# Patient Record
Sex: MALE | Race: WHITE | NOT HISPANIC OR LATINO
[De-identification: names, ages, dates, MRNs, and addresses within clinical notes are randomized per-mention and may not be internally consistent; named-entity substitution may affect disease eponyms.]

---

## 2017-05-16 ENCOUNTER — RX RENEWAL (OUTPATIENT)
Age: 67
End: 2017-05-16

## 2018-05-15 ENCOUNTER — APPOINTMENT (OUTPATIENT)
Dept: UROLOGY | Facility: CLINIC | Age: 68
End: 2018-05-15

## 2018-05-15 VITALS
RESPIRATION RATE: 17 BRPM | HEART RATE: 50 BPM | DIASTOLIC BLOOD PRESSURE: 85 MMHG | TEMPERATURE: 98.6 F | SYSTOLIC BLOOD PRESSURE: 153 MMHG

## 2018-05-22 LAB — CORE LAB FLUID CYTOLOGY: NORMAL

## 2018-09-08 ENCOUNTER — INPATIENT (INPATIENT)
Facility: HOSPITAL | Age: 68
LOS: 4 days | Discharge: HOME | DRG: 392 | End: 2018-09-13
Attending: INTERNAL MEDICINE | Admitting: INTERNAL MEDICINE
Payer: MEDICARE

## 2018-09-08 VITALS — HEIGHT: 71 IN | WEIGHT: 179.9 LBS

## 2018-09-08 DIAGNOSIS — I48.91 UNSPECIFIED ATRIAL FIBRILLATION: ICD-10-CM

## 2018-09-08 DIAGNOSIS — I25.2 OLD MYOCARDIAL INFARCTION: ICD-10-CM

## 2018-09-08 DIAGNOSIS — K57.20 DIVERTICULITIS OF LARGE INTESTINE WITH PERFORATION AND ABSCESS WITHOUT BLEEDING: ICD-10-CM

## 2018-09-08 DIAGNOSIS — Z90.89 ACQUIRED ABSENCE OF OTHER ORGANS: Chronic | ICD-10-CM

## 2018-09-08 DIAGNOSIS — S43.004S UNSPECIFIED DISLOCATION OF RIGHT SHOULDER JOINT, SEQUELA: Chronic | ICD-10-CM

## 2018-09-08 DIAGNOSIS — Z90.49 ACQUIRED ABSENCE OF OTHER SPECIFIED PARTS OF DIGESTIVE TRACT: Chronic | ICD-10-CM

## 2018-09-08 DIAGNOSIS — Z29.9 ENCOUNTER FOR PROPHYLACTIC MEASURES, UNSPECIFIED: ICD-10-CM

## 2018-09-08 LAB
ALBUMIN SERPL ELPH-MCNC: 4.3 G/DL — SIGNIFICANT CHANGE UP (ref 3.3–5)
ALP SERPL-CCNC: 96 U/L — SIGNIFICANT CHANGE UP (ref 40–120)
ALT FLD-CCNC: 34 U/L — SIGNIFICANT CHANGE UP (ref 12–78)
ANION GAP SERPL CALC-SCNC: 9 MMOL/L — SIGNIFICANT CHANGE UP (ref 5–17)
APPEARANCE UR: CLEAR — SIGNIFICANT CHANGE UP
APTT BLD: 34.7 SEC — SIGNIFICANT CHANGE UP (ref 27.5–37.4)
AST SERPL-CCNC: 28 U/L — SIGNIFICANT CHANGE UP (ref 15–37)
BASOPHILS # BLD AUTO: 0 K/UL — SIGNIFICANT CHANGE UP (ref 0–0.2)
BASOPHILS # BLD AUTO: 0.02 K/UL — SIGNIFICANT CHANGE UP (ref 0–0.2)
BASOPHILS NFR BLD AUTO: 0 % — SIGNIFICANT CHANGE UP (ref 0–2)
BASOPHILS NFR BLD AUTO: 0.2 % — SIGNIFICANT CHANGE UP (ref 0–2)
BILIRUB SERPL-MCNC: 1.4 MG/DL — HIGH (ref 0.2–1.2)
BILIRUB UR-MCNC: NEGATIVE — SIGNIFICANT CHANGE UP
BUN SERPL-MCNC: 19 MG/DL — SIGNIFICANT CHANGE UP (ref 7–23)
CALCIUM SERPL-MCNC: 9.3 MG/DL — SIGNIFICANT CHANGE UP (ref 8.5–10.1)
CHLORIDE SERPL-SCNC: 105 MMOL/L — SIGNIFICANT CHANGE UP (ref 96–108)
CO2 SERPL-SCNC: 29 MMOL/L — SIGNIFICANT CHANGE UP (ref 22–31)
COLOR SPEC: YELLOW — SIGNIFICANT CHANGE UP
CREAT SERPL-MCNC: 1.1 MG/DL — SIGNIFICANT CHANGE UP (ref 0.5–1.3)
DIFF PNL FLD: NEGATIVE — SIGNIFICANT CHANGE UP
EOSINOPHIL # BLD AUTO: 0 K/UL — SIGNIFICANT CHANGE UP (ref 0–0.5)
EOSINOPHIL # BLD AUTO: 0.04 K/UL — SIGNIFICANT CHANGE UP (ref 0–0.5)
EOSINOPHIL NFR BLD AUTO: 0 % — SIGNIFICANT CHANGE UP (ref 0–6)
EOSINOPHIL NFR BLD AUTO: 0.4 % — SIGNIFICANT CHANGE UP (ref 0–6)
GLUCOSE SERPL-MCNC: 129 MG/DL — HIGH (ref 70–99)
GLUCOSE UR QL: NEGATIVE — SIGNIFICANT CHANGE UP
HCT VFR BLD CALC: 37.4 % — LOW (ref 39–50)
HCT VFR BLD CALC: 43.2 % — SIGNIFICANT CHANGE UP (ref 39–50)
HGB BLD-MCNC: 13.1 G/DL — SIGNIFICANT CHANGE UP (ref 13–17)
HGB BLD-MCNC: 14.7 G/DL — SIGNIFICANT CHANGE UP (ref 13–17)
IMM GRANULOCYTES NFR BLD AUTO: 0.3 % — SIGNIFICANT CHANGE UP (ref 0–1.5)
INR BLD: 1.38 RATIO — HIGH (ref 0.88–1.16)
INR BLD: 1.94 RATIO — HIGH (ref 0.88–1.16)
KETONES UR-MCNC: NEGATIVE — SIGNIFICANT CHANGE UP
LEUKOCYTE ESTERASE UR-ACNC: NEGATIVE — SIGNIFICANT CHANGE UP
LIDOCAIN IGE QN: 211 U/L — SIGNIFICANT CHANGE UP (ref 73–393)
LYMPHOCYTES # BLD AUTO: 0.26 K/UL — LOW (ref 1–3.3)
LYMPHOCYTES # BLD AUTO: 0.56 K/UL — LOW (ref 1–3.3)
LYMPHOCYTES # BLD AUTO: 2 % — LOW (ref 13–44)
LYMPHOCYTES # BLD AUTO: 5.2 % — LOW (ref 13–44)
MANUAL SMEAR VERIFICATION: YES — SIGNIFICANT CHANGE UP
MCHC RBC-ENTMCNC: 30.3 PG — SIGNIFICANT CHANGE UP (ref 27–34)
MCHC RBC-ENTMCNC: 30.6 PG — SIGNIFICANT CHANGE UP (ref 27–34)
MCHC RBC-ENTMCNC: 34 GM/DL — SIGNIFICANT CHANGE UP (ref 32–36)
MCHC RBC-ENTMCNC: 35 GM/DL — SIGNIFICANT CHANGE UP (ref 32–36)
MCV RBC AUTO: 87.4 FL — SIGNIFICANT CHANGE UP (ref 80–100)
MCV RBC AUTO: 89.1 FL — SIGNIFICANT CHANGE UP (ref 80–100)
MONOCYTES # BLD AUTO: 0.26 K/UL — SIGNIFICANT CHANGE UP (ref 0–0.9)
MONOCYTES # BLD AUTO: 0.48 K/UL — SIGNIFICANT CHANGE UP (ref 0–0.9)
MONOCYTES NFR BLD AUTO: 2 % — SIGNIFICANT CHANGE UP (ref 2–14)
MONOCYTES NFR BLD AUTO: 4.5 % — SIGNIFICANT CHANGE UP (ref 2–14)
NEUTROPHILS # BLD AUTO: 12.51 K/UL — HIGH (ref 1.8–7.4)
NEUTROPHILS # BLD AUTO: 9.63 K/UL — HIGH (ref 1.8–7.4)
NEUTROPHILS NFR BLD AUTO: 84 % — HIGH (ref 43–77)
NEUTROPHILS NFR BLD AUTO: 89.4 % — HIGH (ref 43–77)
NEUTS BAND # BLD: 12 % — HIGH (ref 0–8)
NITRITE UR-MCNC: NEGATIVE — SIGNIFICANT CHANGE UP
NRBC # BLD: 0 — SIGNIFICANT CHANGE UP
NRBC # BLD: SIGNIFICANT CHANGE UP /100 WBCS (ref 0–0)
PH UR: 7 — SIGNIFICANT CHANGE UP (ref 5–8)
PLAT MORPH BLD: NORMAL — SIGNIFICANT CHANGE UP
PLATELET # BLD AUTO: 109 K/UL — LOW (ref 150–400)
PLATELET # BLD AUTO: 136 K/UL — LOW (ref 150–400)
POTASSIUM SERPL-MCNC: 3.9 MMOL/L — SIGNIFICANT CHANGE UP (ref 3.5–5.3)
POTASSIUM SERPL-SCNC: 3.9 MMOL/L — SIGNIFICANT CHANGE UP (ref 3.5–5.3)
PROT SERPL-MCNC: 8.4 G/DL — HIGH (ref 6–8.3)
PROT UR-MCNC: NEGATIVE — SIGNIFICANT CHANGE UP
PROTHROM AB SERPL-ACNC: 15.1 SEC — HIGH (ref 9.8–12.7)
PROTHROM AB SERPL-ACNC: 21.4 SEC — HIGH (ref 9.8–12.7)
RBC # BLD: 4.28 M/UL — SIGNIFICANT CHANGE UP (ref 4.2–5.8)
RBC # BLD: 4.85 M/UL — SIGNIFICANT CHANGE UP (ref 4.2–5.8)
RBC # FLD: 13.3 % — SIGNIFICANT CHANGE UP (ref 10.3–14.5)
RBC # FLD: 13.4 % — SIGNIFICANT CHANGE UP (ref 10.3–14.5)
RBC BLD AUTO: SIGNIFICANT CHANGE UP
SODIUM SERPL-SCNC: 143 MMOL/L — SIGNIFICANT CHANGE UP (ref 135–145)
SP GR SPEC: 1 — LOW (ref 1.01–1.02)
UROBILINOGEN FLD QL: NEGATIVE — SIGNIFICANT CHANGE UP
WBC # BLD: 10.76 K/UL — HIGH (ref 3.8–10.5)
WBC # BLD: 13.03 K/UL — HIGH (ref 3.8–10.5)
WBC # FLD AUTO: 10.76 K/UL — HIGH (ref 3.8–10.5)
WBC # FLD AUTO: 13.03 K/UL — HIGH (ref 3.8–10.5)

## 2018-09-08 PROCEDURE — 99291 CRITICAL CARE FIRST HOUR: CPT

## 2018-09-08 PROCEDURE — 74177 CT ABD & PELVIS W/CONTRAST: CPT | Mod: 26

## 2018-09-08 PROCEDURE — 99285 EMERGENCY DEPT VISIT HI MDM: CPT

## 2018-09-08 PROCEDURE — 71045 X-RAY EXAM CHEST 1 VIEW: CPT | Mod: 26

## 2018-09-08 PROCEDURE — 93010 ELECTROCARDIOGRAM REPORT: CPT

## 2018-09-08 RX ORDER — DIGOXIN 250 MCG
0.25 TABLET ORAL ONCE
Qty: 0 | Refills: 0 | Status: COMPLETED | OUTPATIENT
Start: 2018-09-08 | End: 2018-09-08

## 2018-09-08 RX ORDER — SODIUM CHLORIDE 9 MG/ML
1000 INJECTION INTRAMUSCULAR; INTRAVENOUS; SUBCUTANEOUS
Qty: 0 | Refills: 0 | Status: DISCONTINUED | OUTPATIENT
Start: 2018-09-08 | End: 2018-09-11

## 2018-09-08 RX ORDER — SODIUM CHLORIDE 9 MG/ML
1000 INJECTION INTRAMUSCULAR; INTRAVENOUS; SUBCUTANEOUS ONCE
Qty: 0 | Refills: 0 | Status: COMPLETED | OUTPATIENT
Start: 2018-09-08 | End: 2018-09-08

## 2018-09-08 RX ORDER — PROTHROMBIN COMPLEX CONCENTRATE (HUMAN) 25.5; 16.5; 24; 22; 22; 26 [IU]/ML; [IU]/ML; [IU]/ML; [IU]/ML; [IU]/ML; [IU]/ML
1500 POWDER, FOR SOLUTION INTRAVENOUS ONCE
Qty: 0 | Refills: 0 | Status: COMPLETED | OUTPATIENT
Start: 2018-09-08 | End: 2018-09-08

## 2018-09-08 RX ORDER — INFLUENZA VIRUS VACCINE 15; 15; 15; 15 UG/.5ML; UG/.5ML; UG/.5ML; UG/.5ML
0.5 SUSPENSION INTRAMUSCULAR ONCE
Qty: 0 | Refills: 0 | Status: COMPLETED | OUTPATIENT
Start: 2018-09-08 | End: 2018-09-13

## 2018-09-08 RX ORDER — PIPERACILLIN AND TAZOBACTAM 4; .5 G/20ML; G/20ML
3.38 INJECTION, POWDER, LYOPHILIZED, FOR SOLUTION INTRAVENOUS ONCE
Qty: 0 | Refills: 0 | Status: COMPLETED | OUTPATIENT
Start: 2018-09-08 | End: 2018-09-08

## 2018-09-08 RX ORDER — PIPERACILLIN AND TAZOBACTAM 4; .5 G/20ML; G/20ML
3.38 INJECTION, POWDER, LYOPHILIZED, FOR SOLUTION INTRAVENOUS EVERY 8 HOURS
Qty: 0 | Refills: 0 | Status: DISCONTINUED | OUTPATIENT
Start: 2018-09-08 | End: 2018-09-13

## 2018-09-08 RX ORDER — WARFARIN SODIUM 2.5 MG/1
1 TABLET ORAL
Qty: 0 | Refills: 0 | COMMUNITY

## 2018-09-08 RX ORDER — ONDANSETRON 8 MG/1
4 TABLET, FILM COATED ORAL ONCE
Qty: 0 | Refills: 0 | Status: COMPLETED | OUTPATIENT
Start: 2018-09-08 | End: 2018-09-08

## 2018-09-08 RX ORDER — IOHEXOL 300 MG/ML
30 INJECTION, SOLUTION INTRAVENOUS ONCE
Qty: 0 | Refills: 0 | Status: COMPLETED | OUTPATIENT
Start: 2018-09-08 | End: 2018-09-08

## 2018-09-08 RX ORDER — MORPHINE SULFATE 50 MG/1
2 CAPSULE, EXTENDED RELEASE ORAL ONCE
Qty: 0 | Refills: 0 | Status: DISCONTINUED | OUTPATIENT
Start: 2018-09-08 | End: 2018-09-08

## 2018-09-08 RX ORDER — PHYTONADIONE (VIT K1) 5 MG
5 TABLET ORAL ONCE
Qty: 0 | Refills: 0 | Status: COMPLETED | OUTPATIENT
Start: 2018-09-08 | End: 2018-09-08

## 2018-09-08 RX ORDER — ACETAMINOPHEN 500 MG
1000 TABLET ORAL ONCE
Qty: 0 | Refills: 0 | Status: COMPLETED | OUTPATIENT
Start: 2018-09-08 | End: 2018-09-08

## 2018-09-08 RX ORDER — METOPROLOL TARTRATE 50 MG
5 TABLET ORAL EVERY 6 HOURS
Qty: 0 | Refills: 0 | Status: DISCONTINUED | OUTPATIENT
Start: 2018-09-08 | End: 2018-09-09

## 2018-09-08 RX ADMIN — Medication 1000 MILLIGRAM(S): at 22:15

## 2018-09-08 RX ADMIN — PIPERACILLIN AND TAZOBACTAM 25 GRAM(S): 4; .5 INJECTION, POWDER, LYOPHILIZED, FOR SOLUTION INTRAVENOUS at 21:15

## 2018-09-08 RX ADMIN — MORPHINE SULFATE 2 MILLIGRAM(S): 50 CAPSULE, EXTENDED RELEASE ORAL at 11:57

## 2018-09-08 RX ADMIN — ONDANSETRON 4 MILLIGRAM(S): 8 TABLET, FILM COATED ORAL at 11:57

## 2018-09-08 RX ADMIN — PROTHROMBIN COMPLEX CONCENTRATE (HUMAN) 400 INTERNATIONAL UNIT(S): 25.5; 16.5; 24; 22; 22; 26 POWDER, FOR SOLUTION INTRAVENOUS at 14:59

## 2018-09-08 RX ADMIN — MORPHINE SULFATE 2 MILLIGRAM(S): 50 CAPSULE, EXTENDED RELEASE ORAL at 12:37

## 2018-09-08 RX ADMIN — Medication 5 MILLIGRAM(S): at 18:04

## 2018-09-08 RX ADMIN — IOHEXOL 30 MILLILITER(S): 300 INJECTION, SOLUTION INTRAVENOUS at 12:10

## 2018-09-08 RX ADMIN — Medication 0.25 MILLIGRAM(S): at 18:04

## 2018-09-08 RX ADMIN — SODIUM CHLORIDE 100 MILLILITER(S): 9 INJECTION INTRAMUSCULAR; INTRAVENOUS; SUBCUTANEOUS at 18:05

## 2018-09-08 RX ADMIN — PIPERACILLIN AND TAZOBACTAM 200 GRAM(S): 4; .5 INJECTION, POWDER, LYOPHILIZED, FOR SOLUTION INTRAVENOUS at 14:47

## 2018-09-08 RX ADMIN — Medication 101 MILLIGRAM(S): at 15:22

## 2018-09-08 RX ADMIN — SODIUM CHLORIDE 1000 MILLILITER(S): 9 INJECTION INTRAMUSCULAR; INTRAVENOUS; SUBCUTANEOUS at 15:57

## 2018-09-08 RX ADMIN — Medication 400 MILLIGRAM(S): at 21:15

## 2018-09-08 RX ADMIN — Medication 5 MILLIGRAM(S): at 23:48

## 2018-09-08 RX ADMIN — SODIUM CHLORIDE 1000 MILLILITER(S): 9 INJECTION INTRAMUSCULAR; INTRAVENOUS; SUBCUTANEOUS at 11:57

## 2018-09-08 NOTE — H&P ADULT - PROBLEM SELECTOR PLAN 2
Atrial fibrillation, not rate controlled at this time likely due to missing medications this morning.   - Continue Metoprolol 200mg orally or convert to IV formulation   - Cont digoxin 120mcg or convert to IV formulation   - Continue coumadin dosing as per ICU and surgery recs Atrial fibrillation, not rate controlled at this time likely due to missing medications this morning.   - Continue Metoprolol 200mg orally or convert to IV formulation  BB  - Cont digoxin 120mcg or convert to IV formulation   - Continue coumadin dosing as per ICU and surgery recs Atrial fibrillation, not rate controlled at this time likely due to missing medications this morning.   - Continue IV formulation  BB,  pt takes Metoprolol 200mg orally   - Pt on digoxin 125 mcg at home, convert to IV Dig daily, follow level.  - HOLD AC for possible  surgery , pt got K sara in ER, AM PT/INR

## 2018-09-08 NOTE — H&P ADULT - PROBLEM SELECTOR PLAN 3
Hx of MI in 2012 x1 stent  - On home ecotrin 81mg   - Medications confirmed with patient. Hx of MI in 2012 x1 stent  - On home ecotrin 81mg -HOLD  - Medications confirmed with patient. IV BB  Cardiology Dr valencia Hx of MI in 2012 x1 stent  - On home ecotrin 81mg -HOLD for possible Sx  - Medications confirmed with patient. IV BB  Cardiology Dr valencia Hx of CAD/ MI in 2012 x1 stent  - On home ecotrin 81mg -HOLD for possible Sx  - Medications confirmed with patient. IV BB  Cardiology Dr valencia D/W  HOLD Statin

## 2018-09-08 NOTE — CONSULT NOTE ADULT - SUBJECTIVE AND OBJECTIVE BOX
[facility]         Kate Lewis, Kayden, Fabian, Eduardo, Alex, Shu        [phone] (office)    CHIEF COMPLAINT: Patient is a 68y old  Male who presents with a chief complaint of see above (08 Sep 2018 15:37)      HPI:  68 year old M with PMHx of chronic af on dig/metoprolol, anticoagulated with warfarin.  He had an lad-territory MI in 2012, s/p pci.  No ischemic testing since then he believes.  He reports a normal ef, and he denies any sxs of angina, heart failure or hemodynamically compromising arrhythmia.  He has an extensive history of diverticulitis (5 episodes in the past) who presented with LLQ abdominal pain starting at 4AM today. He describes the pain as sharp and constant. Additionally he was nauseous and "retching this morning". He states he was performing his routine ADL's yesterday. He did not take any medications for the pain. He was unable to take his medications today. He last ate yesterday evening. He reports feeling constipated for the last couple of days but had BM yesterday morning with bleeding  He denies fevers, chills, chest pain, palpitations, sob. Does not remember when his last colonoscopy was performed.     In the ED, the patient's vital signs were T: 98.8, , /88, R: 16, SpO2 98% on RA. EKG: Atrial fibrillation @110bpm. CXR: No evidence of active chest disease. CT abdomen with oral contrast: Perforated diverticulitis with moderate pneumoperitoneum. No intraperitoneal abscess. WBC of 10.76. CMP significant for bili of 1.4. Dr. Oseguera evaluated patient in ED, recommended ICU admission and non-surgical management at this time. (08 Sep 2018 15:05)    Given the possibility of urgent surgery his inr was normalized.  He did not take his medication today.      PAST MEDICAL & SURGICAL HISTORY:  Atrial fibrillation  MI (myocardial infarction)  Diverticulitis  S/P appendectomy      SOCIAL HISTORY: No active tobacco, alcohol or illicit drug use. former smoker. retired from [employer] dept at [employer]    FAMILY HISTORY:   No pertinent family history of CAD. fh of cva, aneurysm    Outpatient medications:  	warfarin 5 mg oral tablet: 1 tab(s) orally once a day, Last Dose Taken:    · 	atorvastatin 40 mg oral tablet: 1 tab(s) orally once a day, Last Dose Taken:    · 	Metoprolol Succinate  mg oral tablet, extended release: 1 tab(s) orally once a day, Last Dose Taken:    · 	alfuzosin 10 mg oral tablet, extended release: 1 tab(s) orally once a day, Last Dose Taken:    · 	digoxin 125 mcg (0.125 mg) oral tablet: 1 tab(s) orally once a day, Last Dose Taken:    · 	Centrum Silver oral tablet: 1 tab(s) orally once a day, Last Dose Taken:    · 	Ecotrin Adult Low Strength 81 mg oral delayed release tablet: 1 tab(s) orally once a day, Last Dose Taken:      MEDICATIONS  (STANDING):  piperacillin/tazobactam IVPB. 3.375 Gram(s) IV Intermittent every 8 hours  sodium chloride 0.9% Bolus 1000 milliLiter(s) IV Bolus once  sodium chloride 0.9%. 1000 milliLiter(s) (100 mL/Hr) IV Continuous <Continuous>    MEDICATIONS  (PRN):      Allergies    sulfa drugs (Rash)    Intolerances        REVIEW OF SYSTEMS: Is negative for eye, ENT, GI, , allergic, dermatologic, musculoskeletal and neurologic, except as described above.    VITAL SIGNS:   Vital Signs Last 24 Hrs  T(C): 37.1 (08 Sep 2018 11:25), Max: 37.1 (08 Sep 2018 11:25)  T(F): 98.8 (08 Sep 2018 11:25), Max: 98.8 (08 Sep 2018 11:25)  HR: 97 (08 Sep 2018 15:01) (97 - 105)  BP: 147/95 (08 Sep 2018 15:01) (139/88 - 147/95)  BP(mean): --  RR: 17 (08 Sep 2018 15:01) (16 - 17)  SpO2: 99% (08 Sep 2018 15:01) (98% - 99%)    I&O's Summary      PHYSICAL EXAM:    Constitutional: NAD, awake and alert, well-developed  Eyes:  EOMI, no oral cyanosis, conjunctivae clear, anicteric.  Pulmonary: Non-labored, breath sounds are clear bilaterally, no wheezing, rales or rhonchi  Cardiovascular:  irregular S1 and S2. No murmur.  No rubs, gallops or clicks  Gastrointestinal: Bowel Sounds present, soft, nontender.   Lymph: No peripheral edema.   Neurological: Alert, strength and sensitivity are grossly intact  Skin: No obvious lesions/rashes.   Psych:  Mood & affect appropriate .    LABS: All Labs Reviewed:                        14.7   10.76 )-----------( 136      ( 08 Sep 2018 11:55 )             43.2     08 Sep 2018 11:55    143    |  105    |  19     ----------------------------<  129    3.9     |  29     |  1.10     Ca    9.3        08 Sep 2018 11:55    TPro  8.4    /  Alb  4.3    /  TBili  1.4    /  DBili  x      /  AST  28     /  ALT  34     /  AlkPhos  96     08 Sep 2018 11:55    PT/INR - ( 08 Sep 2018 11:55 )   PT: 21.4 sec;   INR: 1.94 ratio         PTT - ( 08 Sep 2018 11:55 )  PTT:34.7 sec      Blood Culture:         RADIOLOGY:  < from: CT Abdomen and Pelvis w/ Oral Cont and w/ IV Cont (09.08.18 @ 14:17) >    EXAM:  CT ABDOMEN AND PELVIS OC IC                            PROCEDURE DATE:  09/08/2018          INTERPRETATION:  Clinical information: Left lower quadrant abdominal pain    COMPARISON: None    PROCEDURE:   CT of the Abdomen and Pelvis was performed with intravenous contrast.   Intravenous contrast: 90 ml Omnipaque 350. 10 ml discarded.  Oral contrast: positive contrast was administered.  Sagittal and coronal reformats were performed.    FINDINGS:    LOWER CHEST: Coronary artery calcifications.    LIVER: A few scattered subcentimeter hypodense lesions which are too   small to characterize  SPLEEN: Within normal limits.  PANCREAS: Within normal limits.  GALLBLADDER: Within normal limits.  BILE DUCTS: Normal caliber.  ADRENALS: Within normal limits.  KIDNEYS/URETERS: Several right renal cysts and multiple low-density   lesions in both kidneys which are too small to characterize. Punctate   nonobstructing right renal calculi. No hydronephrosis.    RETROPERITONEUM: No lymphadenopathy.    VESSELS:  Atherosclerotic arterial calcifications.  Normal caliber aorta.    BOWEL/PERITONEUM: Severe left-sided colonic diverticulosis and moderate   inflammatory changes adjacent to the proximal descending colon with small   pockets of extraluminal gas. Moderate amount of pneumoperitoneum. No   intraperitoneal abscess or ascites.  Appendix has been resected.    REPRODUCTIVE ORGANS: Markedly enlarged prostate.  BLADDER: Within normal limits.    ABDOMINAL WALL: Within normal limits.  BONES: No acute bony abnormality.    IMPRESSION: Perforated diverticulitis with moderate pneumoperitoneum. No   intraperitoneal abscess.    Findings discussed with Dr. Mcwilliams on September 08, 2018, 1430 hours.                      VAISHNAVI SANDOVAL M.D., ATTENDING RADIOLOGIST  This document has been electronically signed. Sep  8 2018  2:29PM                < end of copied text >    EKG: af, stt c/w dig    Impression/Plan:

## 2018-09-08 NOTE — H&P ADULT - FAMILY HISTORY
Father  Still living? Unknown  Family history of myocardial infarction, Age at diagnosis: Age Unknown     Uncle  Still living? Unknown  Family history of prostate cancer, Age at diagnosis: Age Unknown

## 2018-09-08 NOTE — H&P ADULT - HISTORY OF PRESENT ILLNESS
68 year old M with PMHx of A.fib on Coumadin and digoxin, MI x1 stent (July 2012, LAD) and extensive history of diverticulitis (5 episodes in the past) who presented with LLQ abdominal pain starting at 4AM today. He describes the pain as sharp and constant. Additionally he was nauseous and "retching this morning". He states he was performing his routine ADL's yesterday. He did not take any medications for the pain. He was unable to take his medications today. He last ate yesterday evening. He reports feeling constipated for the last couple of days but had BM yesterday morning with bleeding  He denies fevers, chills, chest pain, palpitations, sob. Does not remember when his last colonoscopy was performed.     In the ED, the patient's vital signs were T: 98.8, , /88, R: 16, SpO2 98% on RA. EKG: Atrial fibrillation @110bpm. CXR: No evidence of active chest disease. CT abdomen with oral contrast: Perforated diverticulitis with moderate pneumoperitoneum. No intraperitoneal abscess. WBC of 10.76. CMP significant for bili of 1.4. Dr. Oseguera evaluated patient in ED, recommended ICU admission and non-surgical management at this time. 68 year old M with PMHx of A.fib on Coumadin and digoxin, MI x1 stent (July 2012, LAD) and extensive history of diverticulitis (5 episodes in the past) who presented with LLQ abdominal pain starting at 4AM today. He describes the pain as sharp and constant. Additionally he was nauseous and "retching this morning". He states he was performing his routine ADL's yesterday. He did not take any medications for the pain. He was unable to take his medications today. He last ate yesterday evening. He reports feeling constipated for the last couple of days but had BM yesterday morning with bleeding  He denies fevers, chills, chest pain, palpitations, sob. Does not remember when his last colonoscopy was performed.     In the ED, the patient's vital signs were T: 98.8, , /88, R: 16, SpO2 98% on RA. EKG: Atrial fibrillation @110bpm. CXR: No evidence of active chest disease. CT abdomen with oral contrast: Perforated diverticulitis with moderate pneumoperitoneum. No intraperitoneal abscess. WBC of 10.76. CMP significant for bili of 1.4. Dr. Oseguera evaluated patient in ED, recommended ICU admission and non-surgical management at this time. Given the possibility of urgent surgery his inr was normalized. 68 year old M with PMHx of A.fib on Coumadin and digoxin, MI x1 stent (July 2012, LAD) and extensive history of diverticulitis (5 episodes in the past) who presented with LLQ abdominal pain starting at 4AM today. He describes the pain as sharp and constant. Additionally he was nauseous and "retching this morning". He states he was performing his routine ADL's yesterday. He did not take any medications for the pain. He was unable to take his medications today. He last ate yesterday evening. He reports feeling constipated for the last couple of days but had BM yesterday morning with bleeding  He denies fevers, chills, chest pain, palpitations, sob. Does not remember when his last colonoscopy was performed.     In the ED, the patient's vital signs were T: 98.8, , /88, R: 16, SpO2 98% on RA. EKG: Atrial fibrillation @110bpm. CXR: No evidence of active chest disease. CT abdomen with oral contrast: Perforated diverticulitis with moderate pneumoperitoneum. No intraperitoneal abscess. WBC of 10.76. CMP significant for bili of 1.4. Dr. Oseguera evaluated patient in ED, recommended ICU admission and non-surgical management at this time. Given the possibility of urgent surgery Pt was given K centra in ER, Pt Got IV Fluid Bolus & IV Zosyn x 1 in ER.

## 2018-09-08 NOTE — H&P ADULT - PMH
Atrial fibrillation    Diverticulitis    MI (myocardial infarction) Atrial fibrillation    CAD (coronary artery disease)  Stent 2012 [facility]  Diverticulitis    Dyslipidemia    Essential hypertension    MI (myocardial infarction)

## 2018-09-08 NOTE — CONSULT NOTE ADULT - SUBJECTIVE AND OBJECTIVE BOX
Pt is a 68 year old man who was awakened from sleep at 4 AM by LLQ tenderness.  Pain remains localized to LLQ.  He has had diverticulitis 5 times before, last 15-20 years ago    PMH: MI and LAD stent 2012; a fib; right shoulder surgery, left surgery fracture, open appendectomy as teenager  allergy: sulfa  meds noted, include coumadin'    SH ex-smoker since 25 years ago  FH: n.  ROS colonoscopy 10 years ago (polyps)    PE T 98.8  HR 97  /95  Appears comfortable in NAD  Abd: entirely nontender in RUQ, RLQ, LUQ with no guarding; sharp tenderness LLQ to level of umbilicus; no distention    WBC 10.8  Hb 14.7  INR 1.94    CT images reviewed with radiologist: large amount of free air under diaphragm, diverticulitis at junction of distal descending colon and sigmoid colon    Impr: perforated diverticulitis.  Pt has little pain  now off pain meds and no sign of peritonitis.  Perforation may have sealed off.  If he develops worsening or spreading pain or signs of sepsis he will need resection with colostomy    P: observe closely in ICU, IV zosyn, bedrest

## 2018-09-08 NOTE — CONSULT NOTE ADULT - ASSESSMENT
69 y/o male pmhx of afib on coumadin, prior MI in 2012, HTN, HLD, diverticuloses, presented to ED w/ severe abdominal pain. Pt admitted w/   perforated diverticulitis w/ moderate pneumoperitoneum, therapeutic INR s/p reversal, patient admitted to ICU for close HD monitoring and serial abdominal exams.     CV: afib, hold coumadin for now. Rate controlled for now, IV lopressor PRN if needed. Switch to IV Dig for now   GI: Perorated Diverticulitis and pneumoperitoneum, Keep NPO.  Serial abdominal exams. No signs of peritonitis , perforation may have sealed off as per surgery.  May need emergent surgery if he becomes infected or pain worsens. Pain control. Bed rest. Zosyn IV q8hr. IVF hydration. Surgery following.   heme: therapeutic INR, s/p Kcentra. Repeat INR to ensure reversal.

## 2018-09-08 NOTE — CONSULT NOTE ADULT - SUBJECTIVE AND OBJECTIVE BOX
Patient is a 68y old  Male who presents with a chief complaint of abdominal pain (08 Sep 2018 15:57)      69 y/o male pmhx of afib on coumadin, prior MI in , HTN, HLD, diverticuloses, presented to ED w/ severe abdominal pain. Patient states last night he woke up at 4am in severe lower abdominal pain, associated w/ nausea and vomiting. Patient that throughout the day the pain worsened. He has a history of diverticulitis in past ( about 6 times). He denies fever/chills, diarrhea melena, hematemesis chest pain, SOB. Pt found to have perforated diverticulitis w/ moderate pneumoperitoneum. He had an INR of 1.94, and took his coumadin last night, he received Kcentra  for immediate reversal .        PAST MEDICAL & SURGICAL HISTORY:  Atrial fibrillation  MI (myocardial infarction)  Diverticulitis  S/P appendectomy    Allergies    sulfa drugs (Rash)    Intolerances      FAMILY HISTORY:      Review of Systems:  CONSTITUTIONAL: No fever, chills, or fatigue  EYES: No eye pain, visual disturbances, or discharge  ENMT:  No difficulty hearing, tinnitus, vertigo; No sinus or throat pain  NECK: No pain or stiffness  RESPIRATORY: No cough, wheezing, chills or hemoptysis; No shortness of breath  CARDIOVASCULAR: No chest pain, palpitations, dizziness, or leg swelling  GASTROINTESTINAL: (+)abdominal  pain. (+)nausea and vomiting, NO hematemesis; No diarrhea or constipation. No melena or hematochezia.  GENITOURINARY: No dysuria, frequency, hematuria, or incontinence  NEUROLOGICAL: No headaches, memory loss, loss of strength, numbness, or tremors  SKIN: No itching, burning, rashes, or lesions   MUSCULOSKELETAL: No joint pain or swelling; No muscle, back, or extremity pain  PSYCHIATRIC: No depression, anxiety, mood swings, or difficulty sleeping      Medications:  piperacillin/tazobactam IVPB. 3.375 Gram(s) IV Intermittent every 8 hours  sodium chloride 0.9%. 1000 milliLiter(s) IV Continuous <Continuous>                ICU Vital Signs Last 24 Hrs  T(C): 37.1 (08 Sep 2018 11:25), Max: 37.1 (08 Sep 2018 11:25)  T(F): 98.8 (08 Sep 2018 11:25), Max: 98.8 (08 Sep 2018 11:25)  HR: 97 (08 Sep 2018 15:01) (97 - 105)  BP: 147/95 (08 Sep 2018 15:01) (139/88 - 147/95)  RR: 17 (08 Sep 2018 15:) (16 - 17)  SpO2: 99% (08 Sep 2018 15:) (98% - 99%)    Vital Signs Last 24 Hrs  T(C): 37.1 (08 Sep 2018 11:25), Max: 37.1 (08 Sep 2018 11:25)  T(F): 98.8 (08 Sep 2018 11:25), Max: 98.8 (08 Sep 2018 11:25)  HR: 97 (08 Sep 2018 15:) (97 - 105)  BP: 147/95 (08 Sep 2018 15:01) (139/88 - 147/95)  BP(mean): --  RR: 17 (08 Sep 2018 15:01) (16 - 17)  SpO2: 99% (08 Sep 2018 15:) (98% - 99%)        I&O's Detail        LABS:                        14.7   10.76 )-----------( 136      ( 08 Sep 2018 11:55 )             43.2     -    143  |  105  |  19  ----------------------------<  129<H>  3.9   |  29  |  1.10    Ca    9.3      08 Sep 2018 11:55    TPro  8.4<H>  /  Alb  4.3  /  TBili  1.4<H>  /  DBili  x   /  AST  28  /  ALT  34  /  AlkPhos  96  09-08          CAPILLARY BLOOD GLUCOSE        PT/INR - ( 08 Sep 2018 11:55 )   PT: 21.4 sec;   INR: 1.94 ratio         PTT - ( 08 Sep 2018 11:55 )  PTT:34.7 sec  Urinalysis Basic - ( 08 Sep 2018 13:25 )    Color: Yellow / Appearance: Clear / S.005 / pH: x  Gluc: x / Ketone: Negative  / Bili: Negative / Urobili: Negative   Blood: x / Protein: Negative / Nitrite: Negative   Leuk Esterase: Negative / RBC: x / WBC x   Sq Epi: x / Non Sq Epi: x / Bacteria: x      CULTURES:      Physical Examination:    General: AAOX3. NO acute distress. Abd pain improving     HEENT: Pupils equal, reactive to light.  Symmetric.    PULM: Clear to auscultation bilaterally, no significant sputum production. No wheeze/ rales/ rhonchi     CVS:  Irregular  no murmurs, rubs, or gallops. + S1/S2    ABD:  Non distended,  LLQ tenderness, No guarding + BSx4 quadrants     EXT: No edema, nontender    SKIN: Warm and well perfused, no rashes noted.    NEURO: A&Ox3, strength 5/5 all extremities, cranial nerves grossly intact, no focal deficits    RADIOLOGY: < from: CT Abdomen and Pelvis w/ Oral Cont and w/ IV Cont (18 @ 14:17) >  INTERPRETATION:  Clinical information: Left lower quadrant abdominal pain    COMPARISON: None    PROCEDURE:   CT of the Abdomen and Pelvis was performed with intravenous contrast.   Intravenous contrast: 90 ml Omnipaque 350. 10 ml discarded.  Oral contrast: positive contrast was administered.  Sagittal and coronal reformats were performed.    FINDINGS:    LOWER CHEST: Coronary artery calcifications.    LIVER: A few scattered subcentimeter hypodense lesions which are too   small to characterize  SPLEEN: Within normal limits.  PANCREAS: Within normal limits.  GALLBLADDER: Within normal limits.  BILE DUCTS: Normal caliber.  ADRENALS: Within normal limits.  KIDNEYS/URETERS: Several right renal cysts and multiple low-density   lesions in both kidneys which are too small to characterize. Punctate   nonobstructing right renal calculi. No hydronephrosis.    RETROPERITONEUM: No lymphadenopathy.    VESSELS:  Atherosclerotic arterial calcifications.  Normal caliber aorta.    BOWEL/PERITONEUM: Severe left-sided colonic diverticulosis and moderate   inflammatory changes adjacent to the proximal descending colon with small   pockets of extraluminal gas. Moderate amount of pneumoperitoneum. No   intraperitoneal abscess or ascites.  Appendix has been resected.    REPRODUCTIVE ORGANS: Markedly enlarged prostate.  BLADDER: Within normal limits.    ABDOMINAL WALL: Within normal limits.  BONES: No acute bony abnormality.    IMPRESSION: Perforated diverticulitis with moderate pneumoperitoneum. No   intraperitoneal abscess.    Findings discussed with Dr. Mcwilliams on 2018, 1430 hours.    < end of copied text >       TIME SPENT:  35 min   Evaluating/treating patient, reviewing data/labs/imaging, discussing case with multidisciplinary team, discussing plan/goals of care with patient/family. Non-inclusive of procedure time.

## 2018-09-08 NOTE — CONSULT NOTE ADULT - SUBJECTIVE AND OBJECTIVE BOX
[facility], Division of Infectious Diseases  TITI Forman A. Lee  [phone]  VAISHNAVI REED  68y, Male  [id number]    HPI:  68 year old M with PMHx of A.fib on Coumadin and digoxin,cad, and extensive history of diverticulitis (5 episodes in the past) who presented with LLQ abdominal pain starting at 4AM today. He describes the pain as sharp and constant associated with nausea and vomiting. + fevers and chills.  Was in normal state of health yesterday.  no recent antibx no sick contacts.    PMH/PSH--  Atrial fibrillation  MI (myocardial infarction)  Diverticulitis  S/P appendectomy      Allergies--sulfa      Medications--  Antibiotics: piperacillin/tazobactam IVPB. 3.375 Gram(s) IV Intermittent every 8 hours    Immunologic: influenza   Vaccine 0.5 milliLiter(s) IntraMuscular once    Other: sodium chloride 0.9%.      Social History--  EtOH: denies ***  Tobacco: former  Drug Use: denies ***    Family/Marital History--  Family history of prostate cancer (Uncle)  Family history of myocardial infarction (Father)    Remainder not relevant to clinical concern.    Travel/Environmental/Occupational History:  retired worked in finance    Review of Systems:  A >=10-point review of systems was obtained.     Pertinent positives and negatives--  Constitutional: No fevers. No Chills. + Rigors.   Eyes: no blurry vision  ENMT: no sore throat  Cardiovascular: No chest pain. No palpitations.  Respiratory: No shortness of breath. No cough.  Gastrointestinal: No nausea or vomiting. No diarrhea + constipation.   Genitourinary: no dysuria  Musculoskeletal: no myalgia  Skin: no rash  Neurologic: no headache, no dizziness  Psychiatric: no anxiety    Review of systems otherwise negative except as previously noted.    Physical Exam--  Vital Signs: T(F): 98.8 (09-08-18 @ 11:25), Max: 98.8 (09-08-18 @ 11:25)  HR: 97 (09-08-18 @ 15:01)  BP: 147/95 (09-08-18 @ 15:01)  RR: 17 (09-08-18 @ 15:01)  SpO2: 99% (09-08-18 @ 15:01)  Wt(kg): --  General: Nontoxic-appearing Male in no acute distress.  HEENT: AT/NC.. Anicteric. Conjunctiva pink and moist. Oropharynx clear. Dentition fair.  Neck: Not rigid. No sense of mass.  Nodes: None palpable.  Lungs: Clear bilaterally without rales, wheezing or rhonchi  Heart: irreg s1s2  Abdomen: Bowel sounds present and normoactive. Soft. Nondistended. Nontender.  Extremities: No cyanosis or clubbing. No edema.   Skin: Warm. Dry. Good turgor. No rash. No vasculitic stigmata.  Psychiatric: Appropriate affect and mood for situation.         Laboratory & Imaging Data--  CBC                        14.7   10.76 )-----------( 136      ( 08 Sep 2018 11:55 )             43.2       Chemistries  09-08    143  |  105  |  19  ----------------------------<  129<H>  3.9   |  29  |  1.10    Ca    9.3      08 Sep 2018 11:55    TPro  8.4<H>  /  Alb  4.3  /  TBili  1.4<H>  /  DBili  x   /  AST  28  /  ALT  34  /  AlkPhos  96  09-08      Culture Data    < from: Xray Chest 1 View- PORTABLE-Routine (09.08.18 @ 14:52) >    EXAM:  XR CHEST PORTABLE ROUTINE 1V                            PROCEDURE DATE:  09/08/2018          INTERPRETATION:  Portable chest radiograph        CLINICAL INFORMATION:   Perforated diverticulitis. Admission chest   radiograph    TECHNIQUE:  Portable  AP view of the chest was obtained.    COMPARISON: No previous examinations are available for review.    FINDINGS:   The lungs  are clear.  No pleural abnormality is seen.         The heart and mediastinum are within normal limits.         Visualized osseous structures are intact.        IMPRESSION:   No evidence of active chest disease.            < end of copied text >      < from: CT Abdomen and Pelvis w/ Oral Cont and w/ IV Cont (09.08.18 @ 14:17) >    EXAM:  CT ABDOMEN AND PELVIS OC IC                            PROCEDURE DATE:  09/08/2018          INTERPRETATION:  Clinical information: Left lower quadrant abdominal pain    COMPARISON: None    PROCEDURE:   CT of the Abdomen and Pelvis was performed with intravenous contrast.   Intravenous contrast: 90 ml Omnipaque 350. 10 ml discarded.  Oral contrast: positive contrast was administered.  Sagittal and coronal reformats were performed.    FINDINGS:    LOWER CHEST: Coronary artery calcifications.    LIVER: A few scattered subcentimeter hypodense lesions which are too   small to characterize  SPLEEN: Within normal limits.  PANCREAS: Within normal limits.  GALLBLADDER: Within normal limits.  BILE DUCTS: Normal caliber.  ADRENALS: Within normal limits.  KIDNEYS/URETERS: Several right renal cysts and multiple low-density   lesions in both kidneys which are too small to characterize. Punctate   nonobstructing right renal calculi. No hydronephrosis.    RETROPERITONEUM: No lymphadenopathy.    VESSELS:  Atherosclerotic arterial calcifications.  Normal caliber aorta.    BOWEL/PERITONEUM: Severe left-sided colonic diverticulosis and moderate   inflammatory changes adjacent to the proximal descending colon with small   pockets of extraluminal gas. Moderate amount of pneumoperitoneum. No   intraperitoneal abscess or ascites.  Appendix has been resected.    REPRODUCTIVE ORGANS: Markedly enlarged prostate.  BLADDER: Within normal limits.    ABDOMINAL WALL: Within normal limits.  BONES: No acute bony abnormality.    IMPRESSION: Perforated diverticulitis with moderate pneumoperitoneum. No   intraperitoneal abscess.    < end of copied text >

## 2018-09-08 NOTE — PROGRESS NOTE ADULT - SUBJECTIVE AND OBJECTIVE BOX
T 99.8    BP wnl  No abdominal pain.  Feels like "hunger pangs" Would like to eat  He got IV Tylenol tonight for back pain and headache, was assess by PA prior to Tylenol and had no change in abdominal exam   PE: Only tender over area of diverticulitis; rest of abdomen nontender, nondistended with no guarding or rebound  I asked PA not to give any more pain meds without speaking with me first

## 2018-09-08 NOTE — H&P ADULT - PROBLEM SELECTOR PLAN 4
Patient on Coumadin at home. Given kcentra for possible surgery. Management as per ICU team.   IMPROVE VTE Individual Risk Assessment        RISK                                                          Points  [  ] Previous VTE                                                3  [  ] Thrombophilia                                             2  [  ] Lower limb paralysis                                   2        (unable to hold up >15 seconds)    [  ] Current Cancer                                             2         (within 6 months)  [  ] Immobilization > 24 hrs                              1  [  ] ICU/CCU stay > 24 hours                             1  [ x ] Age > 60                                                         1    IMPROVE VTE Score: 1 Pt takes Toprol  mg daily  Start Metoprolol 5 mg IV P q 6 hrs

## 2018-09-08 NOTE — CONSULT NOTE ADULT - ATTENDING COMMENTS
Patient seen and examined  Agree with above  69 y/o male with A.fib, CAD, HLD admitted with perforated diverticulitis  Except for RVR, his vitals are stable  He denies any abdominal pain, nausea, retching at the moment  On exam, he has tenderness in LLQ to deep palpation with some guarding, rest of the abdomen is nontender  Labs and CT reviewed  Continue zosyn, ivf  Start iv metoprolol  Hold AC in case if he needs surgery, received Vit K & PCC in the ED  Overnight monitoring in ICU  Patient updated on plan

## 2018-09-08 NOTE — ED PROVIDER NOTE - OBJECTIVE STATEMENT
68 y male presents with llq pain, states began 4 am, denies fever,  hx of diverticuiltis, states this pain is similar to last episode 15-20 years ago,  vomited x 1 in ed,   PMH: Atrial fibrillation , Diverticulitis  ,MI (myocardial infarction).  states he had appendectomy when he was 8 years old.  PMD Dr Rider

## 2018-09-08 NOTE — H&P ADULT - GASTROINTESTINAL DETAILS
soft/no distention/no guarding/no rigidity no rigidity/no distention/no masses palpable/no bruit/soft/no guarding/no organomegaly

## 2018-09-08 NOTE — ED PROVIDER NOTE - CHPI ED SYMPTOMS NEG
no dysuria/no diarrhea/no abdominal distension/no fever/no hematuria/no burning urination/no chills/no blood in stool

## 2018-09-08 NOTE — H&P ADULT - ASSESSMENT
68 year old M with PMHx of A.fib on Coumadin and digoxin, MI x1 stent (July 2012, LAD) and extensive history of diverticulitis (5 episodes in the past) who presented with LLQ abdominal pain, admitted for perforated diverticulitis.

## 2018-09-08 NOTE — ED PROVIDER NOTE - ATTENDING CONTRIBUTION TO CARE
pt with hx diverticulitis c/o llq abd pain today associated with n/v, c/w prior episode of diverticulitis. no fevers, diarrhea, dysuria, freq.  s1s2 tachy, lungs cta b/l, abd soft, tender lower abd L>>R

## 2018-09-08 NOTE — H&P ADULT - RS GEN PE MLT RESP DETAILS PC
no rales/normal/breath sounds equal/good air movement/no rhonchi/no wheezes breath sounds equal/no rales/clear to auscultation bilaterally/good air movement/normal/no rhonchi/no wheezes

## 2018-09-08 NOTE — H&P ADULT - PSH
S/P appendectomy Dislocation of right shoulder joint, sequela  s/p surgery yrs ago  S/P appendectomy    S/P tonsillectomy

## 2018-09-08 NOTE — ED ADULT TRIAGE NOTE - CHIEF COMPLAINT QUOTE
"I have pain in my left lower abdomen."  pt woke up at 0400 with severe LLQ pain and vomiting, pt diaphoretic in triage

## 2018-09-08 NOTE — ED ADULT NURSE NOTE - NSIMPLEMENTINTERV_GEN_ALL_ED
Implemented All Fall with Harm Risk Interventions:  [place] to call system. Call bell, personal items and telephone within reach. Instruct patient to call for assistance. Room bathroom lighting operational. Non-slip footwear when patient is off stretcher. Physically safe environment: no spills, clutter or unnecessary equipment. Stretcher in lowest position, wheels locked, appropriate side rails in place. Provide visual cue, wrist band, yellow gown, etc. Monitor gait and stability. Monitor for mental status changes and reorient to person, place, and time. Review medications for side effects contributing to fall risk. Reinforce activity limits and safety measures with patient and family. Provide visual clues: red socks.

## 2018-09-08 NOTE — H&P ADULT - PROBLEM SELECTOR PLAN 1
Admit to ICU   - Non-surgical management at this time.   - Notify Dr. Oseguera, surgeon, if patient has worsening pain, tachycardia, hypotension. Hold pain meds. If patient begins to experience these symptoms, he will need resection with colostomy.   - keep NPO  - bedrest   - Continue IV zosyn, fluids.   - Type and screen noted.   - Patient agreed to Hepatitis C and HIV testing on admission. Ordered with AM labs Admit to ICU -  - Non-surgical management at this time.   - Notify Dr. Oseguera, surgeon, if patient has worsening pain, tachycardia, hypotension. Hold pain meds. If patient begins to experience these symptoms, he will need resection with colostomy.   - keep NPO  - bedrest , NO Pain Meds as per DR Oseguera  - Continue IV zosyn, fluids.   - Type and screen noted.   - Patient agreed to Hepatitis C and HIV testing on admission. Ordered with AM labs  ID Dr Ricardo called Admit to ICU -  - Non-surgical management at this time.   - Notify Dr. Oseguera, surgeon, if patient has worsening pain, tachycardia, hypotension. Hold pain meds. If patient begins to experience these symptoms, he will need resection with colostomy.   - keep NPO. Mild leukocytosis  - bedrest , NO Pain Meds as per DR Oseguera  - Continue IV zosyn,  IV fluids.   - Type and screen noted.   - Patient agreed to Hepatitis C and HIV testing on admission. Ordered with AM labs  ID Dr Ricardo called Admit to ICU - Perforated Diveticulitis with Moderate Pneumoperitoneum  - Non-surgical management at this time.   - Notify Dr. Oseguera, surgeon, if patient has worsening pain, tachycardia, hypotension. Hold pain meds. If patient begins to experience these symptoms, he will need resection with colostomy.   - keep NPO. Mild leukocytosis  - bedrest , NO Pain Meds as per DR Oseguera  - Continue IV zosyn,  IV fluids.   - Type and screen noted.   - Patient agreed to Hepatitis C and HIV testing on admission. Ordered with AM labs  ID Dr Ricardo called

## 2018-09-08 NOTE — ED PROVIDER NOTE - CARE PLAN
Principal Discharge DX:	Abdominal pain, left lower quadrant Principal Discharge DX:	Diverticulitis of large intestine with perforation, unspecified bleeding status

## 2018-09-08 NOTE — ED ADULT NURSE NOTE - OBJECTIVE STATEMENT
Pt. received alert and oriented x4 with chief complaint of Left sided abdominal pain since 0400 this morning. Pt. also states multiple episodes of N/V w/ no bowel movement for 1 day. Pt. presents w/ positive bowel sounds in all 4 quadrants.

## 2018-09-08 NOTE — H&P ADULT - ATTENDING COMMENTS
Pt seen, examined, case & care plan d/w pt, residents at detail,  Pt is schedule for emergent EXP Lap & repair of perforated Diverticulitis   Pt is receiving K centra to reverse Coumadin for emergent surgery.  pt is optimized for emergent surgery at this time    NPO  Pre Op IV Antibiotics as per Surgery  Post Op resume  Anti coagulation as per Surgery  - will continue judith op BB,   -Post op Incentive spirometry. Pt seen, examined, case & care plan d/w pt, residents at detail,  Pt is schedule for emergent EXP Lap & repair of perforated Diverticulitis   Pt is receiving K centra to reverse Coumadin for emergent surgery.  pt is optimized for emergent surgery at this time    NPO  Pre Op IV Antibiotics as per Surgery  Post Op resume  Anti coagulation as per Surgery  - will continue judith op BB,   -Post op Incentive spirometry.    As per Dr Oseguera -pt has soft abdomen, will HOLD Emergent Sx for NOW, NPO, IV Fluids, IV Abx,, Clinical course to follow, HOLD OR for NOW

## 2018-09-08 NOTE — CONSULT NOTE ADULT - ASSESSMENT
68 year old M with PMHx of chronic af on dig/metoprolol, anticoagulated with warfarin.  He had an lad-territory MI in 2012, s/p pci.  No ischemic testing since then he believes.  He reports a normal ef, and he denies any sxs of angina, heart failure or hemodynamically compromising arrhythmia.  He has an extensive history of diverticulitis (5 episodes in the past) who presented with LLQ abdominal pain starting at 4AM today. He describes the pain as sharp and constant. Additionally he was nauseous and "retching this morning". He states he was performing his routine ADL's yesterday. He did not take any medications for the pain. He was unable to take his medications today. He last ate yesterday evening. He reports feeling constipated for the last couple of days but had BM yesterday morning with bleeding  He denies fevers, chills, chest pain, palpitations, sob. Does not remember when his last colonoscopy was performed.     In the ED, the patient's vital signs were T: 98.8, , /88, R: 16, SpO2 98% on RA. EKG: Atrial fibrillation @110bpm. CXR: No evidence of active chest disease. CT abdomen with oral contrast: Perforated diverticulitis with moderate pneumoperitoneum. No intraperitoneal abscess. WBC of 10.76. CMP significant for bili of 1.4. Dr. Oseguera evaluated patient in ED, recommended ICU admission and non-surgical management at this time. (08 Sep 2018 15:05)    Given the possibility of urgent surgery his inr was normalized.  He did not take his medication today.    -there is no evidence of acute ischemia.  -h/o cad  -cont asa  -cont bb  -cont statin    -there is no evidence of significant arrhythmia.  -chronic af on ac  -cont dig  -cont bb  -if to remain npo, use of iv dig and iv metoprolol can be considered, otherwise can simply continue his longstanding regular meds  -eventually to resume warfarin, goal inr 2-3    -there is no evidence for meaningful  volume overload.  -presumed to have a normal ef from his description    -from a cv perspective he requires resumption of his rate control medication.  Once this is handled, he would be considered optimized from a cv perspective for what may be urgent surgery.  Routine hemodynamic monitoring is recommended.    -to be admitted to the icu given his abdominal process  -monitor electrolytes, keep k>4, Mg>2   -will follow    The patient is at risk of abrupt decompensation.  35 minutes was spent with this patient.

## 2018-09-08 NOTE — H&P ADULT - NSHPSOCIALHISTORY_GEN_ALL_CORE
Social History:    Marital Status:  ( x  )    (   ) Single    (   )    (  )   Occupation: retired from [employer]  Lives with: (  ) alone  (  ) children   ( x ) spouse   (  ) parents  (  ) other    Substance Use (street drugs): ( x ) never used  (  ) other:  Tobacco Usage:  (   ) never smoked   ( x  ) former smoker   (   ) current smoker  (  on and off starting at age 14   ) pack year  (    25 years ago    ) last cigarette date  Alcohol Usage: social EtOH    Health Management  Immunization Hx:   (  x) flu shot                               (  2017   ) date   ( x ) pneumonia shot               (  2017   ) date  ( x ) tetanus                               (     ) date Patient believes he is up to date Social History:    Marital Status:  ( x  )    (   ) Single    (   )    (  )   Occupation: retired from [employer]  Lives with: (  ) alone  (  ) children   ( x ) spouse   (  ) parents  (  ) other    Substance Use (street drugs): ( x ) never used  (  ) other:  Tobacco Usage:  (   ) never smoked   ( x  ) former smoker   (   ) current smoker  (  on and off starting at age 14   ) pack year  (    25 years ago    ) last cigarette date  Alcohol Usage: social EtOH    Health Management  Immunization Hx:   (  x) flu shot                               (  2017   ) date   ( x ) pneumonia shot               (  2017   ) date  (  ) tetanus                               (     ) date Patient believes he is up to date

## 2018-09-08 NOTE — H&P ADULT - PROBLEM SELECTOR PLAN 5
Patient on Coumadin at home. Given kcentra for possible surgery. Management as per ICU team.   IMPROVE VTE Individual Risk Assessment        RISK                                                          Points  [  ] Previous VTE                                                3  [  ] Thrombophilia                                             2  [  ] Lower limb paralysis                                   2        (unable to hold up >15 seconds)    [  ] Current Cancer                                             2         (within 6 months)  [  ] Immobilization > 24 hrs                              1  [  ] ICU/CCU stay > 24 hours                             1  [ x ] Age > 60                                                         1    IMPROVE VTE Score: 1

## 2018-09-09 DIAGNOSIS — I10 ESSENTIAL (PRIMARY) HYPERTENSION: ICD-10-CM

## 2018-09-09 DIAGNOSIS — D69.6 THROMBOCYTOPENIA, UNSPECIFIED: ICD-10-CM

## 2018-09-09 LAB
ALBUMIN SERPL ELPH-MCNC: 3.1 G/DL — LOW (ref 3.3–5)
ALP SERPL-CCNC: 67 U/L — SIGNIFICANT CHANGE UP (ref 40–120)
ALT FLD-CCNC: 25 U/L — SIGNIFICANT CHANGE UP (ref 12–78)
ANION GAP SERPL CALC-SCNC: 8 MMOL/L — SIGNIFICANT CHANGE UP (ref 5–17)
AST SERPL-CCNC: 23 U/L — SIGNIFICANT CHANGE UP (ref 15–37)
BILIRUB SERPL-MCNC: 2.3 MG/DL — HIGH (ref 0.2–1.2)
BLD GP AB SCN SERPL QL: SIGNIFICANT CHANGE UP
BUN SERPL-MCNC: 16 MG/DL — SIGNIFICANT CHANGE UP (ref 7–23)
CALCIUM SERPL-MCNC: 8.3 MG/DL — LOW (ref 8.5–10.1)
CHLORIDE SERPL-SCNC: 108 MMOL/L — SIGNIFICANT CHANGE UP (ref 96–108)
CO2 SERPL-SCNC: 28 MMOL/L — SIGNIFICANT CHANGE UP (ref 22–31)
CREAT SERPL-MCNC: 0.88 MG/DL — SIGNIFICANT CHANGE UP (ref 0.5–1.3)
CULTURE RESULTS: SIGNIFICANT CHANGE UP
DIGOXIN SERPL-MCNC: 0.8 NG/ML — SIGNIFICANT CHANGE UP (ref 0.8–2)
GLUCOSE SERPL-MCNC: 100 MG/DL — HIGH (ref 70–99)
HCT VFR BLD CALC: 36.3 % — LOW (ref 39–50)
HCV AB S/CO SERPL IA: 0.27 S/CO — SIGNIFICANT CHANGE UP
HCV AB SERPL-IMP: SIGNIFICANT CHANGE UP
HGB BLD-MCNC: 12.1 G/DL — LOW (ref 13–17)
HIV 1 & 2 AB SERPL IA.RAPID: SIGNIFICANT CHANGE UP
INR BLD: 1.5 RATIO — HIGH (ref 0.88–1.16)
MCHC RBC-ENTMCNC: 29.8 PG — SIGNIFICANT CHANGE UP (ref 27–34)
MCHC RBC-ENTMCNC: 33.3 GM/DL — SIGNIFICANT CHANGE UP (ref 32–36)
MCV RBC AUTO: 89.4 FL — SIGNIFICANT CHANGE UP (ref 80–100)
NRBC # BLD: 0 /100 WBCS — SIGNIFICANT CHANGE UP (ref 0–0)
PLATELET # BLD AUTO: 103 K/UL — LOW (ref 150–400)
POTASSIUM SERPL-MCNC: 3.5 MMOL/L — SIGNIFICANT CHANGE UP (ref 3.5–5.3)
POTASSIUM SERPL-SCNC: 3.5 MMOL/L — SIGNIFICANT CHANGE UP (ref 3.5–5.3)
PROT SERPL-MCNC: 6.4 G/DL — SIGNIFICANT CHANGE UP (ref 6–8.3)
PROTHROM AB SERPL-ACNC: 16.5 SEC — HIGH (ref 9.8–12.7)
RBC # BLD: 4.06 M/UL — LOW (ref 4.2–5.8)
RBC # FLD: 13.3 % — SIGNIFICANT CHANGE UP (ref 10.3–14.5)
SODIUM SERPL-SCNC: 144 MMOL/L — SIGNIFICANT CHANGE UP (ref 135–145)
SPECIMEN SOURCE: SIGNIFICANT CHANGE UP
WBC # BLD: 9.47 K/UL — SIGNIFICANT CHANGE UP (ref 3.8–10.5)
WBC # FLD AUTO: 9.47 K/UL — SIGNIFICANT CHANGE UP (ref 3.8–10.5)

## 2018-09-09 PROCEDURE — 99232 SBSQ HOSP IP/OBS MODERATE 35: CPT

## 2018-09-09 PROCEDURE — 99291 CRITICAL CARE FIRST HOUR: CPT

## 2018-09-09 RX ORDER — PANTOPRAZOLE SODIUM 20 MG/1
40 TABLET, DELAYED RELEASE ORAL DAILY
Qty: 0 | Refills: 0 | Status: DISCONTINUED | OUTPATIENT
Start: 2018-09-09 | End: 2018-09-11

## 2018-09-09 RX ORDER — TAMSULOSIN HYDROCHLORIDE 0.4 MG/1
0.4 CAPSULE ORAL AT BEDTIME
Qty: 0 | Refills: 0 | Status: DISCONTINUED | OUTPATIENT
Start: 2018-09-09 | End: 2018-09-13

## 2018-09-09 RX ORDER — ZOLPIDEM TARTRATE 10 MG/1
5 TABLET ORAL ONCE
Qty: 0 | Refills: 0 | Status: DISCONTINUED | OUTPATIENT
Start: 2018-09-09 | End: 2018-09-09

## 2018-09-09 RX ORDER — METOPROLOL TARTRATE 50 MG
25 TABLET ORAL EVERY 6 HOURS
Qty: 0 | Refills: 0 | Status: DISCONTINUED | OUTPATIENT
Start: 2018-09-09 | End: 2018-09-12

## 2018-09-09 RX ORDER — DIGOXIN 250 MCG
0.12 TABLET ORAL DAILY
Qty: 0 | Refills: 0 | Status: DISCONTINUED | OUTPATIENT
Start: 2018-09-10 | End: 2018-09-13

## 2018-09-09 RX ORDER — ENOXAPARIN SODIUM 100 MG/ML
40 INJECTION SUBCUTANEOUS DAILY
Qty: 0 | Refills: 0 | Status: DISCONTINUED | OUTPATIENT
Start: 2018-09-09 | End: 2018-09-13

## 2018-09-09 RX ORDER — POTASSIUM CHLORIDE 20 MEQ
10 PACKET (EA) ORAL
Qty: 0 | Refills: 0 | Status: COMPLETED | OUTPATIENT
Start: 2018-09-09 | End: 2018-09-09

## 2018-09-09 RX ADMIN — ZOLPIDEM TARTRATE 5 MILLIGRAM(S): 10 TABLET ORAL at 23:31

## 2018-09-09 RX ADMIN — Medication 100 MILLIEQUIVALENT(S): at 08:25

## 2018-09-09 RX ADMIN — PANTOPRAZOLE SODIUM 40 MILLIGRAM(S): 20 TABLET, DELAYED RELEASE ORAL at 11:27

## 2018-09-09 RX ADMIN — Medication 25 MILLIGRAM(S): at 23:31

## 2018-09-09 RX ADMIN — Medication 100 MILLIEQUIVALENT(S): at 10:34

## 2018-09-09 RX ADMIN — Medication 5 MILLIGRAM(S): at 11:27

## 2018-09-09 RX ADMIN — ENOXAPARIN SODIUM 40 MILLIGRAM(S): 100 INJECTION SUBCUTANEOUS at 11:27

## 2018-09-09 RX ADMIN — TAMSULOSIN HYDROCHLORIDE 0.4 MILLIGRAM(S): 0.4 CAPSULE ORAL at 18:41

## 2018-09-09 RX ADMIN — Medication 5 MILLIGRAM(S): at 06:19

## 2018-09-09 RX ADMIN — PIPERACILLIN AND TAZOBACTAM 25 GRAM(S): 4; .5 INJECTION, POWDER, LYOPHILIZED, FOR SOLUTION INTRAVENOUS at 06:19

## 2018-09-09 RX ADMIN — PIPERACILLIN AND TAZOBACTAM 25 GRAM(S): 4; .5 INJECTION, POWDER, LYOPHILIZED, FOR SOLUTION INTRAVENOUS at 14:43

## 2018-09-09 RX ADMIN — Medication 25 MILLIGRAM(S): at 18:41

## 2018-09-09 RX ADMIN — PIPERACILLIN AND TAZOBACTAM 25 GRAM(S): 4; .5 INJECTION, POWDER, LYOPHILIZED, FOR SOLUTION INTRAVENOUS at 22:08

## 2018-09-09 NOTE — PROGRESS NOTE ADULT - PROBLEM SELECTOR PLAN 1
continue medical management  IV zosyn  follow exam  npo for now  afeb and wbc decreased  pt transferred out of ICU to floor

## 2018-09-09 NOTE — DIETITIAN INITIAL EVALUATION ADULT. - NS AS NUTRI INTERV ED CONTENT
Purpose of the nutrition education/Low fiber nutrition therapy provided with good understanding. RDs name/phone number left with patient if questions/concerns arise.

## 2018-09-09 NOTE — DIETITIAN INITIAL EVALUATION ADULT. - PROBLEM SELECTOR PLAN 3
Hx of MI in 2012 x1 stent  - On home ecotrin 81mg -HOLD  - Medications confirmed with patient. IV BB  Cardiology Dr valencia

## 2018-09-09 NOTE — PROGRESS NOTE ADULT - PROBLEM SELECTOR PLAN 1
Admit to ICU -stable, WBC -Normal  - Non-surgical management at this time as per DR Oseguera   - Notify Dr. Oseguera/ Dr Santana, if patient has worsening pain, tachycardia, hypotension.  - Hold pain meds. If patient begins to experience these symptoms, he will need to go to OR  - keep NPO, IV Fluids  cc/hr  -  NO Pain Meds as per DR Oseguera  - Continue IV zosyn IV q 8 hrs   - Type and screen noted.   - Patient agreed to Hepatitis C and HIV testing on admission. Ordered with AM labs  ID Dr Ricardo called

## 2018-09-09 NOTE — PROGRESS NOTE ADULT - ASSESSMENT
68 year old M with PMHx of chronic af on dig/metoprolol, anticoagulated with warfarin.  He had an lad-territory MI in 2012, s/p pci.  No ischemic testing since then he believes.  He reports a normal ef, and he denies any sxs of angina, heart failure or hemodynamically compromising arrhythmia.  He has an extensive history of diverticulitis (5 episodes in the past) who presented with LLQ abdominal pain starting at 4AM today. He describes the pain as sharp and constant. Additionally he was nauseous and "retching this morning". He states he was performing his routine ADL's yesterday. He did not take any medications for the pain. He was unable to take his medications today. He last ate yesterday evening. He reports feeling constipated for the last couple of days but had BM yesterday morning with bleeding  He denies fevers, chills, chest pain, palpitations, sob. Does not remember when his last colonoscopy was performed.     In the ED, the patient's vital signs were T: 98.8, , /88, R: 16, SpO2 98% on RA. EKG: Atrial fibrillation @110bpm. CXR: No evidence of active chest disease. CT abdomen with oral contrast: Perforated diverticulitis with moderate pneumoperitoneum. No intraperitoneal abscess. WBC of 10.76. CMP significant for bili of 1.4. Dr. Oseguera evaluated patient in ED, recommended ICU admission and non-surgical management at this time. (08 Sep 2018 15:05)    Given the possibility of urgent surgery his inr was reversed.       -there is no evidence of acute ischemia.  -h/o cad  -cont asa  -cont bb  -cont statin    -there is no evidence of significant arrhythmia.  -chronic af on ac  -cont dig iv for now, while npo  -cont bb iv for now, while npo   -eventually to resume warfarin, goal inr 2-3    -there is no evidence for meaningful  volume overload.  -presumed to have a normal ef from his description    -from a cv perspective he would be considered optimized from a cv perspective for what may be urgent surgery.  Routine hemodynamic monitoring is recommended.  -thus far need for surgery is not felt to be immediate    -to cont to follow in icu given his abdominal process  -monitor electrolytes, keep k>4, Mg>2   -will follow    The patient is at risk of abrupt decompensation.  35 minutes was spent with this patient.

## 2018-09-09 NOTE — DIETITIAN INITIAL EVALUATION ADULT. - OTHER INFO
Pt A+Ox4. Dx perforated diverticulitis. Per surgeon (9/9) appears perforation clinically sealed off. NPO on IVF-NS@100cc/hr. No report N/V/D. Last BM 9/7 per pt. Hypoactive BS. No abdominal pain reported. Pt with hx diverticulitis in past. Follows heart healthy high fiber diet pta. Hx lactose intolerance- drinks lactaid milk. Low fiber nutrition therapy provided (written and verbal) with good understanding.

## 2018-09-09 NOTE — PROGRESS NOTE ADULT - SUBJECTIVE AND OBJECTIVE BOX
Patient is a 68y old  Male who presents with a chief complaint of abdominal pain (09 Sep 2018 08:36)    24 hour events: no new events  HR improved  c/o abd pain with movement and palpation otherwise feels ok    REVIEW OF SYSTEMS  Constitutional: No fever, chills, fatigue  Neuro: No headache, numbness, weakness  Resp: No cough, wheezing, shortness of breath  CVS: No chest pain, palpitations, leg swelling  GI: +abdominal pain with movements, No nausea, vomiting, diarrhea   : No dysuria, frequency, incontinence  Skin: No itching, burning, rashes, or lesions   Msk: No joint pain or swelling  Psych: No depression, anxiety, mood swings  Heme: No bleeding    T(F): 98.2 (18 @ 08:00), Max: 99.8 (18 @ 20:00)  HR: 76 (18 @ 10:00) (74 - 122)  BP: 122/60 (18 @ 10:00) (101/53 - 151/86)  RR: 16 (18 @ 10:00) (11 - 28)  SpO2: 99% (18 @ 10:00) (94% - 100%)    I&O's Summary     @ 07:  -   @ 07:00  --------------------------------------------------------  IN: 1700 mL / OUT: 800 mL / NET: 900 mL     @ 07:  -   @ 10:38  --------------------------------------------------------  IN: 600 mL / OUT: 0 mL / NET: 600 mL    PHYSICAL EXAM  General: NAD  CNS: AAO x 3, no focal deficits  HEENT: PERRL  Resp: clear  CVS: S1S2, irregular, HR normal  Abd: soft, LLQ tenderness, no rebound, +BS  Ext: no edema  Skin: warm    MEDICATIONS  piperacillin/tazobactam IVPB. IV Intermittent  metoprolol tartrate Injectable IV Push  enoxaparin Injectable SubCutaneous  pantoprazole  Injectable IV Push  sodium chloride 0.9%. IV Continuous  influenza   Vaccine IntraMuscular                        12.1   9.47  )-----------( 103      ( 09 Sep 2018 06:56 )             36.3     Bands 12.0        144  |  108  |  16  ----------------------------<  100<H>  3.5   |  28  |  0.88    Ca    8.3<L>      09 Sep 2018 06:56    TPro  6.4  /  Alb  3.1<L>  /  TBili  2.3<H>  /  DBili  x   /  AST  23  /  ALT  25  /  AlkPhos  67      PT/INR - ( 09 Sep 2018 06:56 )   PT: 16.5 sec;   INR: 1.50 ratio       PTT - ( 08 Sep 2018 11:55 )  PTT:34.7 sec  Urinalysis Basic - ( 08 Sep 2018 13:25 )    Color: Yellow / Appearance: Clear / S.005 / pH: x  Gluc: x / Ketone: Negative  / Bili: Negative / Urobili: Negative   Blood: x / Protein: Negative / Nitrite: Negative   Leuk Esterase: Negative / RBC: x / WBC x   Sq Epi: x / Non Sq Epi: x / Bacteria: x    CENTRAL LINE: N           BUITRAGO: N                        A-LINE: N                       GLOBAL ISSUE/BEST PRACTICE  Analgesia: NA  Sedation: NA  CAM-ICU: neg  HOB elevation: yes  Stress ulcer prophylaxis: NA   VTE prophylaxis: Y  Glycemic control: Y  Nutrition: N    CODE STATUS: full

## 2018-09-09 NOTE — DIETITIAN INITIAL EVALUATION ADULT. - NS AS NUTRI INTERV MEALS SNACK
When medically feasible recommend clear liquid diet and advance as tolerated to Low fiber, Dash/TLC, lactose free.

## 2018-09-09 NOTE — PROGRESS NOTE ADULT - PROBLEM SELECTOR PLAN 4
Patient on Coumadin at home. Given kcentra for possible surgery. Management as per ICU team.   IMPROVE VTE Individual Risk Assessment        RISK                                                          Points  [  ] Previous VTE                                                3  [  ] Thrombophilia                                             2  [  ] Lower limb paralysis                                   2        (unable to hold up >15 seconds)    [  ] Current Cancer                                             2         (within 6 months)  [  ] Immobilization > 24 hrs                              1  [  ] ICU/CCU stay > 24 hours                             1  [ x ] Age > 60                                                         1    IMPROVE VTE Score: 1 2/2 Ac perforated Diverticulitis  on IV abx

## 2018-09-09 NOTE — PROGRESS NOTE ADULT - SUBJECTIVE AND OBJECTIVE BOX
[facility], Division of Infectious Diseases  [facility] Form[facility] A. Lee  [phone]  Name: VAISHNAVI REED  Age: 68y  Gender: Male  MRN: [id number]    Interval History--  Notes reviewed  have discomfort in abd like gas pain. Did have a bowel movement.  no nausea, no vomiting.    Past Medical History--  Dyslipidemia  Essential hypertension  CAD (coronary artery disease)  Atrial fibrillation  MI (myocardial infarction)  Diverticulitis  Dislocation of right shoulder joint, sequela  S/P tonsillectomy  S/P appendectomy      For details regarding the patient's social history, family history, and other miscellaneous elements, please refer the initial infectious diseases consultation and/or the admitting history and physical examination for this admission.    Allergies    sulfa drugs (Rash)    Intolerances    lactose (Unknown)      Medications--  Antibiotics:  piperacillin/tazobactam IVPB. 3.375 Gram(s) IV Intermittent every 8 hours    Immunologic:  influenza   Vaccine 0.5 milliLiter(s) IntraMuscular once    Other:  enoxaparin Injectable  metoprolol tartrate Injectable  pantoprazole  Injectable  sodium chloride 0.9%.      Review of Systems--  A 10-point review of systems was obtained.     Pertinent positives and negatives--  Constitutional: No fevers. No Chills. No Rigors.   Cardiovascular: No chest pain. No palpitations.  Respiratory: No shortness of breath. No cough.  Gastrointestinal: No nausea or vomiting. No diarrhea or constipation.   Psychiatric: no depression    Review of systems otherwise negative except as previously noted.    Physical Examination--  Vital Signs: T(F): 98.2 (09-09-18 @ 12:21), Max: 99.8 (09-08-18 @ 20:00)  HR: 78 (09-09-18 @ 12:21)  BP: 135/76 (09-09-18 @ 12:21)  RR: 16 (09-09-18 @ 12:21)  SpO2: 99% (09-09-18 @ 12:21)  Wt(kg): --  General: Nontoxic-appearing Male in no acute distress.  HEENT: AT/NC. Anicteric. Conjunctiva pink and moist. Oropharynx clear. Dentition fair.  Neck: Not rigid. No sense of mass.  Nodes: None palpable.  Lungs: Clear bilaterally without rales, wheezing or rhonchi  Heart: irreg s1s2  Abdomen: Bowel sounds present and normoactive. Soft. Nondistended. Nontender.  Extremities: No cyanosis or clubbing. No edema.   Skin: Warm. Dry. Good turgor. No rash. No vasculitic stigmata.  Psychiatric: Appropriate affect and mood for situation.         Laboratory Studies--  CBC                        12.1   9.47  )-----------( 103      ( 09 Sep 2018 06:56 )             36.3       Chemistries  09-09    144  |  108  |  16  ----------------------------<  100<H>  3.5   |  28  |  0.88    Ca    8.3<L>      09 Sep 2018 06:56    TPro  6.4  /  Alb  3.1<L>  /  TBili  2.3<H>  /  DBili  x   /  AST  23  /  ALT  25  /  AlkPhos  67  09-09      Culture Data    < from: Xray Chest 1 View- PORTABLE-Routine (09.08.18 @ 14:52) >  EXAM:  XR CHEST PORTABLE ROUTINE 1V                            PROCEDURE DATE:  09/08/2018          INTERPRETATION:  Portable chest radiograph        CLINICAL INFORMATION:   Perforated diverticulitis. Admission chest   radiograph    TECHNIQUE:  Portable  AP view of the chest was obtained.    COMPARISON: No previous examinations are available for review.    FINDINGS:   The lungs  are clear.  No pleural abnormality is seen.         The heart and mediastinum are within normal limits.         Visualized osseous structures are intact.        IMPRESSION:   No evidence of active chest disease.          < end of copied text >

## 2018-09-09 NOTE — PROGRESS NOTE ADULT - SUBJECTIVE AND OBJECTIVE BOX
T 98.2  HR 96  /72  No abdominal pain, off pain meds  Abd: focal tenderness in area of diverticulitis but rest of abd entirely soft and nontender, nondistended  WBC 9.47  platelets 103 K  Bili 2.3  LFTs wnl  Impr perforation clinically sealed off  Discussed with Dr Santana who will assume care tomorrow. Continue NPO and IV zosyn

## 2018-09-09 NOTE — DIETITIAN INITIAL EVALUATION ADULT. - PROBLEM SELECTOR PLAN 1
Admit to ICU -  - Non-surgical management at this time.   - Notify Dr. Oseguera, surgeon, if patient has worsening pain, tachycardia, hypotension. Hold pain meds. If patient begins to experience these symptoms, he will need resection with colostomy.   - keep NPO  - bedrest , NO Pain Meds as per DR Oseguera  - Continue IV zosyn, fluids.   - Type and screen noted.   - Patient agreed to Hepatitis C and HIV testing on admission. Ordered with AM labs  ID Dr Ricardo called

## 2018-09-09 NOTE — DIETITIAN INITIAL EVALUATION ADULT. - PROBLEM SELECTOR PLAN 2
Atrial fibrillation, not rate controlled at this time likely due to missing medications this morning.   - Continue Metoprolol 200mg orally or convert to IV formulation  BB  - Cont digoxin 120mcg or convert to IV formulation   - Continue coumadin dosing as per ICU and surgery recs

## 2018-09-09 NOTE — PROGRESS NOTE ADULT - SUBJECTIVE AND OBJECTIVE BOX
Patient is a 68y old  Male who presents with a chief complaint of abdominal pain (09 Sep 2018 08:36)      INTERVAL HPI:      OVERNIGHT EVENTS:    Home Medications:  alfuzosin 10 mg oral tablet, extended release: 1 tab(s) orally once a day (08 Sep 2018 16:31)  atorvastatin 40 mg oral tablet: 1 tab(s) orally once a day (08 Sep 2018 16:31)  Centrum Silver oral tablet: 1 tab(s) orally once a day (08 Sep 2018 16:31)  digoxin 125 mcg (0.125 mg) oral tablet: 1 tab(s) orally once a day (08 Sep 2018 16:31)  Ecotrin Adult Low Strength 81 mg oral delayed release tablet: 1 tab(s) orally once a day (08 Sep 2018 16:31)  Metoprolol Succinate  mg oral tablet, extended release: 1 tab(s) orally once a day (08 Sep 2018 16:31)  warfarin 5 mg oral tablet: 1 tab(s) orally Monday through Saturday  (08 Sep 2018 16:31)  warfarin 7.5 mg oral tablet: orally every  (08 Sep 2018 16:31)      MEDICATIONS  (STANDING):  enoxaparin Injectable 40 milliGRAM(s) SubCutaneous daily  influenza   Vaccine 0.5 milliLiter(s) IntraMuscular once  metoprolol tartrate Injectable 5 milliGRAM(s) IV Push every 6 hours  pantoprazole  Injectable 40 milliGRAM(s) IV Push daily  piperacillin/tazobactam IVPB. 3.375 Gram(s) IV Intermittent every 8 hours  sodium chloride 0.9%. 1000 milliLiter(s) (100 mL/Hr) IV Continuous <Continuous>    MEDICATIONS  (PRN):      Allergies    sulfa drugs (Rash)    Intolerances    lactose (Unknown)      REVIEW OF SYSTEMS:  CONSTITUTIONAL: No fever, No chills, No fatigue, No myalgia, No Body ache, No Weakness  EYES: No eye pain,  No visual disturbances, No discharge, NO Redness  ENMT:  No ear pain, No nose bleed, No vertigo; No sinus or throat pain, No Congestion  NECK: No pain, No stiffness  RESPIRATORY: No cough, wheezing, No  hemoptysis, No shortness of breath  CARDIOVASCULAR: No chest pain, palpitations  GASTROINTESTINAL: No abdominal or epigastric pain. No nausea, No vomiting; No diarrhea or constipation. [  ] BM  GENITOURINARY: No dysuria, No frequency, No urgency, No hematuria, or incontinence  NEUROLOGICAL: No headaches, No dizziness, No numbness, No tingling, No tremors, No weakness  EXT: No Swelling, No Pain, No Edema  SKIN:  [  ] No itching, burning, rashes, or lesions   MUSCULOSKELETAL: No joint pain or swelling; No muscle pain, No back pain, No extremity pain  PSYCHIATRIC: No depression, anxiety, mood swings or difficulty sleeping at night  PAIN SCALE: [  ] None  [  ] Other-  ROS Unable to obtain due to - [  ] Dementia  [  ] Lethargy  [  ] Sedated [  ] non verbal  REST OF REVIEW Of SYSTEM - [  ] Normal     Vital Signs Last 24 Hrs  T(C): 36.8 (09 Sep 2018 12:21), Max: 37.7 (08 Sep 2018 20:00)  T(F): 98.2 (09 Sep 2018 12:21), Max: 99.8 (08 Sep 2018 20:00)  HR: 78 (09 Sep 2018 12:21) (74 - 122)  BP: 135/76 (09 Sep 2018 12:21) (101/53 - 151/86)  BP(mean): 100 (09 Sep 2018 11:22) (72 - 109)  RR: 16 (09 Sep 2018 12:21) (11 - 28)  SpO2: 99% (09 Sep 2018 12:21) (94% - 100%)  Finger Stick         @ 07:  -   @ 07:00  --------------------------------------------------------  IN: 1700 mL / OUT: 800 mL / NET: 900 mL     @ 07:  -   @ 12:58  --------------------------------------------------------  IN: 700 mL / OUT: 200 mL / NET: 500 mL        PHYSICAL EXAM:  GENERAL:  [  ] NAD , [  ] well appearing, [  ] Agitated, [  ] Drowsy,  [  ] Lethargy, [  ] confused   HEAD:  [  ] Normal, [  ] Other  EYES:  [  ] EOMI, [  ] PERRLA, [  ] conjunctiva and sclera clear normal, [  ] Other,  [  ] Pallor,[  ] Discharge  ENMT:  [  ] Normal, [  ] Moist mucous membranes, [  ] Good dentition, [  ] No Thrush  NECK:  [  ] Supple, [  ] No JVD, [  ] Normal thyroid, [  ] Lymphadenopathy [  ] Other  CHEST/LUNG:  [  ] Clear to auscultation bilaterally, [  ] Breath Sounds equal OR Decrease,  [  ] No rales, [  ] No rhonchi  [  ]  No wheezing  HEART:  [  ] Regular rate and rhythm, [  ] tachycardia, [  ] Bradycardia,  [  ] irregular  [  ] No murmurs, No rubs, No gallops, [  ] PPM in place (Mfr:  )  ABDOMEN:  [  ] Soft, [  ] Nontender, [  ] Nondistended, [  ] No mass, [  ] Bowel sounds present, [  ] obese  NERVOUS SYSTEM:  [  ] Alert & Oriented X3, [  ] Nonfocal  [  ] Confusion  [  ] Encephalopathic [  ] Sedated [  ] Unable to assess, [  ] Other-  EXTREMITIES: [  ] 2+ Peripheral Pulses, No clubbing, No cyanosis,  [  ] edema B/L lower EXT. [  ] PVD stasis skin changes B/L Lower EXT  LYMPH: No lymphadenopathy noted  SKIN:  [  ] No rashes or lesions, [  ] Pressure Ulcers, [  ] ecchymosis, [  ] Skin Tears, [  ] Other    DIET:     LABS:                        12.1   9.47  )-----------( 103      ( 09 Sep 2018 06:56 )             36.3     09 Sep 2018 06:56    144    |  108    |  16     ----------------------------<  100    3.5     |  28     |  0.88     Ca    8.3        09 Sep 2018 06:56    TPro  6.4    /  Alb  3.1    /  TBili  2.3    /  DBili  x      /  AST  23     /  ALT  25     /  AlkPhos  67     09 Sep 2018 06:56    PT/INR - ( 09 Sep 2018 06:56 )   PT: 16.5 sec;   INR: 1.50 ratio         PTT - ( 08 Sep 2018 11:55 )  PTT:34.7 sec  Urinalysis Basic - ( 08 Sep 2018 13:25 )    Color: Yellow / Appearance: Clear / S.005 / pH: x  Gluc: x / Ketone: Negative  / Bili: Negative / Urobili: Negative   Blood: x / Protein: Negative / Nitrite: Negative   Leuk Esterase: Negative / RBC: x / WBC x   Sq Epi: x / Non Sq Epi: x / Bacteria: x                           Anemia Panel:      Thyroid Panel:        Lipase, Serum: 211 U/L (18 @ 11:55)          RADIOLOGY & ADDITIONAL TESTS:      HEALTH ISSUES - PROBLEM Dx:  Need for prophylactic measure: Need for prophylactic measure  History of myocardial infarction: History of myocardial infarction  Atrial fibrillation: Atrial fibrillation  Diverticulitis of large intestine with perforation, unspecified bleeding status: Diverticulitis of large intestine with perforation, unspecified bleeding status          Consultant(s) Notes Reviewed:  [  ] YES     Care Discussed with [X] Consultants  [  ] Patient  [  ] Family  [  ]   [  ] Social Service  [  ] RN, [  ] Physical Therapy  DVT PPX: [  ] Lovenox, [  ] S C Heparin, [  ] Coumadin, [  ] Xarelto, [  ] Eliquis, [  ] Pradaxa, [  ] IV Heparin drip, [  ] SCD [  ] Contraindication 2 to GI Bleed,[  ] Ambulation  Advanced directive: [  ] None, [  ] DNR/DNI Patient is a 68y old  Male who presents with a chief complaint of abdominal pain (09 Sep 2018 08:36)      INTERVAL HPI:  68 year old M with PMHx of [place].fib on Coumadin and digoxin, MI x1 stent (2012, LAD) and extensive history of diverticulitis (5 episodes in the past) who presented with LLQ abdominal pain starting at 4AM today. He describes the pain as sharp and constant. Additionally he was nauseous and "retching this morning". He states he was performing his routine ADL's yesterday. He did not take any medications for the pain. He was unable to take his medications today. He last ate yesterday evening. He reports feeling constipated for the last couple of days but had BM yesterday morning with bleeding  He denies fevers, chills, chest pain, palpitations, sob. Does not remember when his last colonoscopy was performed.     In the ED, the patient's vital signs were T: 98.8, , /88, R: 16, SpO2 98% on RA. EKG: Atrial fibrillation @110bpm. CXR: No evidence of active chest disease. CT abdomen with oral contrast: Perforated diverticulitis with moderate pneumoperitoneum. No intraperitoneal abscess. WBC of 10.76. CMP significant for bili of 1.4. Dr. Oseguera evaluated patient in ED, recommended ICU admission and non-surgical management at this time. Given the possibility of urgent surgery Pt was given K centra in ER, Pt Got IV Fluid Bolus & IV Zosyn x 1 in ER.    18: Pt seen, examined in ICU , Feels a lot better today, NPO, IV Fluids, IV Abx Pt had a BM. WBC -Normal    OVERNIGHT EVENTS: NONE    Home Medications:  alfuzosin 10 mg oral tablet, extended release: 1 tab(s) orally once a day (08 Sep 2018 16:31)  atorvastatin 40 mg oral tablet: 1 tab(s) orally once a day (08 Sep 2018 16:31)  Centrum Silver oral tablet: 1 tab(s) orally once a day (08 Sep 2018 16:31)  digoxin 125 mcg (0.125 mg) oral tablet: 1 tab(s) orally once a day (08 Sep 2018 16:31)  Ecotrin Adult Low Strength 81 mg oral delayed release tablet: 1 tab(s) orally once a day (08 Sep 2018 16:31)  Metoprolol Succinate  mg oral tablet, extended release: 1 tab(s) orally once a day (08 Sep 2018 16:31)  warfarin 5 mg oral tablet: 1 tab(s) orally Monday through Saturday  (08 Sep 2018 16:31)  warfarin 7.5 mg oral tablet: orally every  (08 Sep 2018 16:31)      MEDICATIONS  (STANDING):  enoxaparin Injectable 40 milliGRAM(s) SubCutaneous daily  influenza   Vaccine 0.5 milliLiter(s) IntraMuscular once  metoprolol tartrate Injectable 5 milliGRAM(s) IV Push every 6 hours  pantoprazole  Injectable 40 milliGRAM(s) IV Push daily  piperacillin/tazobactam IVPB. 3.375 Gram(s) IV Intermittent every 8 hours  sodium chloride 0.9%. 1000 milliLiter(s) (100 mL/Hr) IV Continuous <Continuous>    MEDICATIONS  (PRN):      Allergies    sulfa drugs (Rash)    Intolerances    lactose (Unknown)      REVIEW OF SYSTEMS:  CONSTITUTIONAL: No fever, No chills, No fatigue, No myalgia, No Body ache, No Weakness  EYES: No eye pain,  No visual disturbances, No discharge, NO Redness  ENMT:  No ear pain, No nose bleed, No vertigo; No sinus or throat pain, No Congestion  NECK: No pain, No stiffness  RESPIRATORY: No cough, wheezing, No  hemoptysis, No shortness of breath  CARDIOVASCULAR: No chest pain, palpitations  GASTROINTESTINAL: MILD LLQ/ abdominal pain, NO epigastric pain. No nausea, No vomiting; No diarrhea or constipation. [ x ] BM  GENITOURINARY: No dysuria, No frequency, No urgency, No hematuria, or incontinence  NEUROLOGICAL: No headaches, No dizziness, No numbness, No tingling, No tremors, No weakness  EXT: No Swelling, No Pain, No Edema  SKIN:  [x  ] No itching, burning, rashes, or lesions   MUSCULOSKELETAL: No joint pain or swelling; No muscle pain, No back pain, No extremity pain  PSYCHIATRIC: No depression, anxiety, mood swings or difficulty sleeping at night  PAIN SCALE: [  ] None  [ x ] Other-Soreness LLQ 4-5/10  ROS Unable to obtain due to - [  ] Dementia  [  ] Lethargy  [  ] Sedated [  ] non verbal  REST OF REVIEW Of SYSTEM - [x  ] Normal     Vital Signs Last 24 Hrs  T(C): 36.8 (09 Sep 2018 12:21), Max: 37.7 (08 Sep 2018 20:00)  T(F): 98.2 (09 Sep 2018 12:), Max: 99.8 (08 Sep 2018 20:00)  HR: 78 (09 Sep 2018 12:) (74 - 122)  BP: 135/76 (09 Sep 2018 12:21) (101/53 - 151/86)  BP(mean): 100 (09 Sep 2018 11:22) (72 - 109)  RR: 16 (09 Sep 2018 12:21) (11 - 28)  SpO2: 99% (09 Sep 2018 12:21) (94% - 100%)  Finger Stick         @ 07:  -   @ 07:00  --------------------------------------------------------  IN: 1700 mL / OUT: 800 mL / NET: 900 mL     @ 07:  -   @ 12:58  --------------------------------------------------------  IN: 700 mL / OUT: 200 mL / NET: 500 mL        PHYSICAL EXAM:  GENERAL:  [ x ] NAD , [ x ] well appearing, [  ] Agitated, [  ] Drowsy,  [  ] Lethargy, [  ] confused   HEAD:  [ x ] Normal, [  ] Other  EYES:  [ x ] EOMI, [ x ] PERRLA, [x  ] conjunctiva and sclera clear normal, [  ] Other,  [  ] Pallor,[  ] Discharge  ENMT:  [ x ] Normal, [ x ] Moist mucous membranes, [  ] Good dentition, [  ] No Thrush  NECK:  [ x ] Supple, [x  ] No JVD, [ x ] Normal thyroid, [  ] Lymphadenopathy [  ] Other  CHEST/LUNG:  [ x ] Clear to auscultation bilaterally, [  ] Breath Sounds equal OR Decrease,  [  ] No rales, [  ] No rhonchi  [  ]  No wheezing  HEART:  [  ] Regular rate and rhythm, [  ] tachycardia, [  ] Bradycardia,  [x  ] irregular  [x  ] No murmurs, No rubs, No gallops, [  ] PPM in place (Mfr:  )  ABDOMEN:  [ x ] Soft, [ x ] Nontender, [x  ] Nondistended, [ x ] No mass, [ x ] Bowel sounds present, [  ] obese, ++Soreness LLQ , NO R/R/G  NERVOUS SYSTEM:  [  ] Alert & Oriented X3, [  ] Nonfocal  [  ] Confusion  [  ] Encephalopathic [  ] Sedated [  ] Unable to assess, [  ] Other-  EXTREMITIES: [ x ] 2+ Peripheral Pulses, No clubbing, No cyanosis,  [  ] edema B/L lower EXT. [  ] PVD stasis skin changes B/L Lower EXT  LYMPH: No lymphadenopathy noted  SKIN:  [ x ] No rashes or lesions, [  ] Pressure Ulcers, [  ] ecchymosis, [  ] Skin Tears, [  ] Other    DIET: NPO    LABS:                        12.1   9.47  )-----------( 103      ( 09 Sep 2018 06:56 )             36.3     09 Sep 2018 06:56    144    |  108    |  16     ----------------------------<  100    3.5     |  28     |  0.88     Ca    8.3        09 Sep 2018 06:56    TPro  6.4    /  Alb  3.1    /  TBili  2.3    /  DBili  x      /  AST  23     /  ALT  25     /  AlkPhos  67     09 Sep 2018 06:56    PT/INR - ( 09 Sep 2018 06:56 )   PT: 16.5 sec;   INR: 1.50 ratio         PTT - ( 08 Sep 2018 11:55 )  PTT:34.7 sec  Urinalysis Basic - ( 08 Sep 2018 13:25 )    Color: Yellow / Appearance: Clear / S.005 / pH: x  Gluc: x / Ketone: Negative  / Bili: Negative / Urobili: Negative   Blood: x / Protein: Negative / Nitrite: Negative   Leuk Esterase: Negative / RBC: x / WBC x   Sq Epi: x / Non Sq Epi: x / Bacteria: x      Lipase, Serum: 211 U/L (18 @ 11:55)      RADIOLOGY & ADDITIONAL TESTS: NONE      HEALTH ISSUES - PROBLEM Dx:  Need for prophylactic measure: Need for prophylactic measure  History of myocardial infarction: History of myocardial infarction  Atrial fibrillation: Atrial fibrillation  Diverticulitis of large intestine with perforation, unspecified bleeding status: Diverticulitis of large intestine with perforation, unspecified bleeding status          Consultant(s) Notes Reviewed:  [ x ] YES     Care Discussed with [X] Consultants  [ x ] Patient  [ x ] Family-wife  [  ]   [  ] Social Service  [ x ] RN, [  ] Physical Therapy  DVT PPX: [  ] Lovenox, [  ] S C Heparin, [  ] Coumadin, [  ] Xarelto, [  ] Eliquis, [  ] Pradaxa, [  ] IV Heparin drip, [x  ] SCD [  ] Contraindication 2 to GI Bleed,[  ] Ambulation  Advanced directive: [ x ] None, [  ] DNR/DNI

## 2018-09-09 NOTE — PROGRESS NOTE ADULT - SUBJECTIVE AND OBJECTIVE BOX
[facility]    Kate Lewis, Kayden, Fabian, Eduardo, Alex, Baldevanaid      [phone]    CHIEF COMPLAINT: Patient is a 68y old  Male who presents with a chief complaint of abdominal pain (08 Sep 2018 22:03)      Follow Up: af, cad, abd pain/perf    Interim history: The patient reports no new symptoms.  Denies chest discomfort and shortness of breath.  Mild persistent abdominal pain.  No new neurologic symptoms.      MEDICATIONS  (STANDING):  influenza   Vaccine 0.5 milliLiter(s) IntraMuscular once  metoprolol tartrate Injectable 5 milliGRAM(s) IV Push every 6 hours  piperacillin/tazobactam IVPB. 3.375 Gram(s) IV Intermittent every 8 hours  potassium chloride  10 mEq/100 mL IVPB 10 milliEquivalent(s) IV Intermittent every 1 hour  sodium chloride 0.9%. 1000 milliLiter(s) (100 mL/Hr) IV Continuous <Continuous>    MEDICATIONS  (PRN):      REVIEW OF SYSTEMS:  eye, ent, GI, , allergic, dermatologic, musculoskeletal and neurologic are negative except as described above    Vital Signs Last 24 Hrs  T(C): 36.8 (09 Sep 2018 08:00), Max: 37.7 (08 Sep 2018 20:00)  T(F): 98.2 (09 Sep 2018 08:00), Max: 99.8 (08 Sep 2018 20:00)  HR: 96 (09 Sep 2018 07:00) (74 - 122)  BP: 108/72 (09 Sep 2018 07:00) (101/53 - 151/86)  BP(mean): 86 (09 Sep 2018 07:00) (72 - 109)  RR: 28 (09 Sep 2018 07:00) (11 - 28)  SpO2: 96% (09 Sep 2018 07:00) (94% - 99%)    I&O's Summary    08 Sep 2018 07:01  -  09 Sep 2018 07:00  --------------------------------------------------------  IN: 1700 mL / OUT: 800 mL / NET: 900 mL        Telemetry past 24h: af controlled    PHYSICAL EXAM:    Constitutional: well-nourished, well-developed, NAD   HEENT:  MMM, sclerae anicteric, conjunctivae clear, no oral cyanosis.  Pulmonary: Non-labored, breath sounds are clear bilaterally, No wheezing, rales or rhonchi  Cardiovascular: irregular, S1 and S2.  No murmur.  No rubs, gallops or clicks  Gastrointestinal: Bowel Sounds present, soft, mildly diffusely tender, r>l.   Lymph: No peripheral edema.   Neurological: Alert, no focal deficits  Skin: No rashes.  Psych:  Mood & affect appropriate    LABS: All Labs Reviewed:                        12.1   9.47  )-----------( 103      ( 09 Sep 2018 06:56 )             36.3                         13.1   13.03 )-----------( 109      ( 08 Sep 2018 20:18 )             37.4                         14.7   10.76 )-----------( 136      ( 08 Sep 2018 11:55 )             43.2     09 Sep 2018 06:56    144    |  108    |  16     ----------------------------<  100    3.5     |  28     |  0.88   08 Sep 2018 11:55    143    |  105    |  19     ----------------------------<  129    3.9     |  29     |  1.10     Ca    8.3        09 Sep 2018 06:56  Ca    9.3        08 Sep 2018 11:55    TPro  6.4    /  Alb  3.1    /  TBili  2.3    /  DBili  x      /  AST  23     /  ALT  25     /  AlkPhos  67     09 Sep 2018 06:56  TPro  8.4    /  Alb  4.3    /  TBili  1.4    /  DBili  x      /  AST  28     /  ALT  34     /  AlkPhos  96     08 Sep 2018 11:55    PT/INR - ( 09 Sep 2018 06:56 )   PT: 16.5 sec;   INR: 1.50 ratio         PTT - ( 08 Sep 2018 11:55 )  PTT:34.7 sec      Blood Culture:         RADIOLOGY:    EKG:    Echo:

## 2018-09-09 NOTE — PROGRESS NOTE ADULT - PROBLEM SELECTOR PLAN 2
Atrial fibrillation, HR stable   - On IV Metoprolol 5 mg IV q 6hrs  - HOLD Dig , follow level  - HOLD AC for NOW for Possible OR, S/P IV KCentra given yesterday, AC as per Surgery  Cardiology Dr valencia

## 2018-09-10 ENCOUNTER — TRANSCRIPTION ENCOUNTER (OUTPATIENT)
Age: 68
End: 2018-09-10

## 2018-09-10 LAB
ALBUMIN SERPL ELPH-MCNC: 3.1 G/DL — LOW (ref 3.3–5)
ALP SERPL-CCNC: 63 U/L — SIGNIFICANT CHANGE UP (ref 40–120)
ALT FLD-CCNC: 25 U/L — SIGNIFICANT CHANGE UP (ref 12–78)
ANION GAP SERPL CALC-SCNC: 8 MMOL/L — SIGNIFICANT CHANGE UP (ref 5–17)
AST SERPL-CCNC: 26 U/L — SIGNIFICANT CHANGE UP (ref 15–37)
BASOPHILS # BLD AUTO: 0.01 K/UL — SIGNIFICANT CHANGE UP (ref 0–0.2)
BASOPHILS NFR BLD AUTO: 0.1 % — SIGNIFICANT CHANGE UP (ref 0–2)
BILIRUB SERPL-MCNC: 1.8 MG/DL — HIGH (ref 0.2–1.2)
BUN SERPL-MCNC: 12 MG/DL — SIGNIFICANT CHANGE UP (ref 7–23)
CALCIUM SERPL-MCNC: 8.7 MG/DL — SIGNIFICANT CHANGE UP (ref 8.5–10.1)
CHLORIDE SERPL-SCNC: 108 MMOL/L — SIGNIFICANT CHANGE UP (ref 96–108)
CO2 SERPL-SCNC: 28 MMOL/L — SIGNIFICANT CHANGE UP (ref 22–31)
CREAT SERPL-MCNC: 0.93 MG/DL — SIGNIFICANT CHANGE UP (ref 0.5–1.3)
EOSINOPHIL # BLD AUTO: 0.02 K/UL — SIGNIFICANT CHANGE UP (ref 0–0.5)
EOSINOPHIL NFR BLD AUTO: 0.3 % — SIGNIFICANT CHANGE UP (ref 0–6)
GLUCOSE SERPL-MCNC: 99 MG/DL — SIGNIFICANT CHANGE UP (ref 70–99)
HCT VFR BLD CALC: 34.8 % — LOW (ref 39–50)
HGB BLD-MCNC: 11.8 G/DL — LOW (ref 13–17)
IMM GRANULOCYTES NFR BLD AUTO: 0.6 % — SIGNIFICANT CHANGE UP (ref 0–1.5)
INR BLD: 1.21 RATIO — HIGH (ref 0.88–1.16)
LYMPHOCYTES # BLD AUTO: 0.6 K/UL — LOW (ref 1–3.3)
LYMPHOCYTES # BLD AUTO: 8.4 % — LOW (ref 13–44)
MAGNESIUM SERPL-MCNC: 2.2 MG/DL — SIGNIFICANT CHANGE UP (ref 1.6–2.6)
MCHC RBC-ENTMCNC: 30.1 PG — SIGNIFICANT CHANGE UP (ref 27–34)
MCHC RBC-ENTMCNC: 33.9 GM/DL — SIGNIFICANT CHANGE UP (ref 32–36)
MCV RBC AUTO: 88.8 FL — SIGNIFICANT CHANGE UP (ref 80–100)
MONOCYTES # BLD AUTO: 0.36 K/UL — SIGNIFICANT CHANGE UP (ref 0–0.9)
MONOCYTES NFR BLD AUTO: 5 % — SIGNIFICANT CHANGE UP (ref 2–14)
NEUTROPHILS # BLD AUTO: 6.12 K/UL — SIGNIFICANT CHANGE UP (ref 1.8–7.4)
NEUTROPHILS NFR BLD AUTO: 85.6 % — HIGH (ref 43–77)
PHOSPHATE SERPL-MCNC: 1.6 MG/DL — LOW (ref 2.5–4.5)
PLATELET # BLD AUTO: 105 K/UL — LOW (ref 150–400)
POTASSIUM SERPL-MCNC: 3.6 MMOL/L — SIGNIFICANT CHANGE UP (ref 3.5–5.3)
POTASSIUM SERPL-SCNC: 3.6 MMOL/L — SIGNIFICANT CHANGE UP (ref 3.5–5.3)
PROT SERPL-MCNC: 6.7 G/DL — SIGNIFICANT CHANGE UP (ref 6–8.3)
PROTHROM AB SERPL-ACNC: 13.2 SEC — HIGH (ref 9.8–12.7)
RBC # BLD: 3.92 M/UL — LOW (ref 4.2–5.8)
RBC # FLD: 13.2 % — SIGNIFICANT CHANGE UP (ref 10.3–14.5)
SODIUM SERPL-SCNC: 144 MMOL/L — SIGNIFICANT CHANGE UP (ref 135–145)
WBC # BLD: 7.15 K/UL — SIGNIFICANT CHANGE UP (ref 3.8–10.5)
WBC # FLD AUTO: 7.15 K/UL — SIGNIFICANT CHANGE UP (ref 3.8–10.5)

## 2018-09-10 PROCEDURE — 99232 SBSQ HOSP IP/OBS MODERATE 35: CPT

## 2018-09-10 PROCEDURE — 99221 1ST HOSP IP/OBS SF/LOW 40: CPT

## 2018-09-10 RX ORDER — LACTOBACILLUS ACIDOPHILUS 100MM CELL
1 CAPSULE ORAL
Qty: 0 | Refills: 0 | Status: DISCONTINUED | OUTPATIENT
Start: 2018-09-10 | End: 2018-09-13

## 2018-09-10 RX ORDER — LANOLIN ALCOHOL/MO/W.PET/CERES
3 CREAM (GRAM) TOPICAL AT BEDTIME
Qty: 0 | Refills: 0 | Status: DISCONTINUED | OUTPATIENT
Start: 2018-09-10 | End: 2018-09-10

## 2018-09-10 RX ORDER — POTASSIUM PHOSPHATE, MONOBASIC POTASSIUM PHOSPHATE, DIBASIC 236; 224 MG/ML; MG/ML
15 INJECTION, SOLUTION INTRAVENOUS ONCE
Qty: 0 | Refills: 0 | Status: COMPLETED | OUTPATIENT
Start: 2018-09-10 | End: 2018-09-10

## 2018-09-10 RX ORDER — LANOLIN ALCOHOL/MO/W.PET/CERES
3 CREAM (GRAM) TOPICAL AT BEDTIME
Qty: 0 | Refills: 0 | Status: DISCONTINUED | OUTPATIENT
Start: 2018-09-10 | End: 2018-09-13

## 2018-09-10 RX ORDER — SODIUM,POTASSIUM PHOSPHATES 278-250MG
1 POWDER IN PACKET (EA) ORAL
Qty: 0 | Refills: 0 | Status: COMPLETED | OUTPATIENT
Start: 2018-09-10 | End: 2018-09-13

## 2018-09-10 RX ADMIN — Medication 1 TABLET(S): at 21:10

## 2018-09-10 RX ADMIN — ENOXAPARIN SODIUM 40 MILLIGRAM(S): 100 INJECTION SUBCUTANEOUS at 11:55

## 2018-09-10 RX ADMIN — Medication 25 MILLIGRAM(S): at 23:42

## 2018-09-10 RX ADMIN — Medication 0.12 MILLIGRAM(S): at 05:41

## 2018-09-10 RX ADMIN — Medication 25 MILLIGRAM(S): at 11:55

## 2018-09-10 RX ADMIN — Medication 1 TABLET(S): at 13:26

## 2018-09-10 RX ADMIN — Medication 25 MILLIGRAM(S): at 17:05

## 2018-09-10 RX ADMIN — Medication 25 MILLIGRAM(S): at 05:41

## 2018-09-10 RX ADMIN — PANTOPRAZOLE SODIUM 40 MILLIGRAM(S): 20 TABLET, DELAYED RELEASE ORAL at 14:45

## 2018-09-10 RX ADMIN — Medication 3 MILLIGRAM(S): at 23:42

## 2018-09-10 RX ADMIN — Medication 1 TABLET(S): at 17:05

## 2018-09-10 RX ADMIN — POTASSIUM PHOSPHATE, MONOBASIC POTASSIUM PHOSPHATE, DIBASIC 62.5 MILLIMOLE(S): 236; 224 INJECTION, SOLUTION INTRAVENOUS at 10:00

## 2018-09-10 RX ADMIN — PIPERACILLIN AND TAZOBACTAM 25 GRAM(S): 4; .5 INJECTION, POWDER, LYOPHILIZED, FOR SOLUTION INTRAVENOUS at 05:41

## 2018-09-10 RX ADMIN — PIPERACILLIN AND TAZOBACTAM 25 GRAM(S): 4; .5 INJECTION, POWDER, LYOPHILIZED, FOR SOLUTION INTRAVENOUS at 21:10

## 2018-09-10 RX ADMIN — SODIUM CHLORIDE 100 MILLILITER(S): 9 INJECTION INTRAMUSCULAR; INTRAVENOUS; SUBCUTANEOUS at 16:58

## 2018-09-10 RX ADMIN — SODIUM CHLORIDE 100 MILLILITER(S): 9 INJECTION INTRAMUSCULAR; INTRAVENOUS; SUBCUTANEOUS at 05:41

## 2018-09-10 RX ADMIN — PIPERACILLIN AND TAZOBACTAM 25 GRAM(S): 4; .5 INJECTION, POWDER, LYOPHILIZED, FOR SOLUTION INTRAVENOUS at 14:45

## 2018-09-10 RX ADMIN — TAMSULOSIN HYDROCHLORIDE 0.4 MILLIGRAM(S): 0.4 CAPSULE ORAL at 21:10

## 2018-09-10 NOTE — CONSULT NOTE ADULT - PROBLEM SELECTOR RECOMMENDATION 9
npo clears  ivf   iv abs  in and out  probiotics
follow abdominal exam  f/u blood cx  cont zosyn for now  bowel rest

## 2018-09-10 NOTE — PROGRESS NOTE ADULT - SUBJECTIVE AND OBJECTIVE BOX
[facility] -- Kate Lewis, Kayden, Fabian, Alex Clark Savella  Office # [phone]      Follow Up:  AF    Subjective/Observations: Patient seen and examined. Events noted. Resting comfortably in bed. No complaints of chest pain, dyspnea, or palpitations reported. No signs of orthopnea or PND. He states that AF is much better.       REVIEW OF SYSTEMS: All other review of systems is negative unless indicated above    PAST MEDICAL & SURGICAL HISTORY:  Dyslipidemia  Essential hypertension  CAD (coronary artery disease): Stent 2012 [facility]  Atrial fibrillation  MI (myocardial infarction)  Diverticulitis  Dislocation of right shoulder joint, sequela: s/p surgery yrs ago  S/P tonsillectomy  S/P appendectomy      MEDICATIONS  (STANDING):  digoxin     Tablet 0.125 milliGRAM(s) Oral daily  enoxaparin Injectable 40 milliGRAM(s) SubCutaneous daily  influenza   Vaccine 0.5 milliLiter(s) IntraMuscular once  lactobacillus acidophilus 1 Tablet(s) Oral three times a day with meals  metoprolol tartrate 25 milliGRAM(s) Oral every 6 hours  pantoprazole  Injectable 40 milliGRAM(s) IV Push daily  piperacillin/tazobactam IVPB. 3.375 Gram(s) IV Intermittent every 8 hours  potassium acid phosphate/sodium acid phosphate tablet (K-PHOS No. 2) 1 Tablet(s) Oral four times a day with meals  sodium chloride 0.9%. 1000 milliLiter(s) (100 mL/Hr) IV Continuous <Continuous>  tamsulosin 0.4 milliGRAM(s) Oral at bedtime    MEDICATIONS  (PRN):  melatonin 3 milliGRAM(s) Oral at bedtime PRN Insomnia      Allergies    sulfa drugs (Rash)    Intolerances    lactose (Unknown)          Vital Signs Last 24 Hrs  T(C): 36.8 (10 Sep 2018 07:38), Max: 37.7 (09 Sep 2018 23:29)  T(F): 98.3 (10 Sep 2018 07:38), Max: 99.8 (09 Sep 2018 23:29)  HR: 96 (10 Sep 2018 11:42) (80 - 99)  BP: 144/88 (10 Sep 2018 11:42) (132/84 - 163/77)  BP(mean): --  RR: 17 (10 Sep 2018 07:38) (16 - 17)  SpO2: 100% (10 Sep 2018 07:38) (97% - 100%)    I&O's Summary    09 Sep 2018 07:01  -  10 Sep 2018 07:00  --------------------------------------------------------  IN: 1100 mL / OUT: 1250 mL / NET: -150 mL    10 Sep 2018 07:01  -  10 Sep 2018 14:29  --------------------------------------------------------  IN: 250 mL / OUT: 500 mL / NET: -250 mL          PHYSICAL EXAM:  TELE: AF 80s  Constitutional: NAD, awake and alert, well-developed  HEENT: Moist Mucous Membranes, Anicteric  Pulmonary: Decreased breath sounds b/l. No rales, crackles or wheeze appreciated.   Cardiovascular: IRRR, S1 and S2, 1/6 SM  Gastrointestinal: Bowel Sounds present, soft, nontender.   Lymph: No peripheral edema. No lymphadenopathy.  Skin: No visible rashes or ulcers.  Psych:  Mood & affect appropriate for situation    LABS: All Labs Reviewed:                        11.8   7.15  )-----------( 105      ( 10 Sep 2018 06:15 )             34.8                         12.1   9.47  )-----------( 103      ( 09 Sep 2018 06:56 )             36.3                         13.1   13.03 )-----------( 109      ( 08 Sep 2018 20:18 )             37.4     10 Sep 2018 06:15    144    |  108    |  12     ----------------------------<  99     3.6     |  28     |  0.93   09 Sep 2018 06:56    144    |  108    |  16     ----------------------------<  100    3.5     |  28     |  0.88   08 Sep 2018 11:55    143    |  105    |  19     ----------------------------<  129    3.9     |  29     |  1.10     Ca    8.7        10 Sep 2018 06:15  Ca    8.3        09 Sep 2018 06:56  Ca    9.3        08 Sep 2018 11:55  Phos  1.6       10 Sep 2018 06:15  Mg     2.2       10 Sep 2018 06:15    TPro  6.7    /  Alb  3.1    /  TBili  1.8    /  DBili  x      /  AST  26     /  ALT  25     /  AlkPhos  63     10 Sep 2018 06:15  TPro  6.4    /  Alb  3.1    /  TBili  2.3    /  DBili  x      /  AST  23     /  ALT  25     /  AlkPhos  67     09 Sep 2018 06:56  TPro  8.4    /  Alb  4.3    /  TBili  1.4    /  DBili  x      /  AST  28     /  ALT  34     /  AlkPhos  96     08 Sep 2018 11:55    PT/INR - ( 10 Sep 2018 06:15 )   PT: 13.2 sec;   INR: 1.21 ratio

## 2018-09-10 NOTE — DISCHARGE NOTE ADULT - MEDICATION SUMMARY - MEDICATIONS TO TAKE
I will START or STAY ON the medications listed below when I get home from the hospital:    Ecotrin Adult Low Strength 81 mg oral delayed release tablet  -- 1 tab(s) by mouth once a day  -- Indication: For CAD (coronary artery disease)    alfuzosin 10 mg oral tablet, extended release  -- 1 tab(s) by mouth once a day  -- Indication: For BPH    digoxin 125 mcg (0.125 mg) oral tablet  -- 1 tab(s) by mouth once a day  -- Indication: For Atrial fibrillation    warfarin 7.5 mg oral tablet  -- orally every Sunday  -- Indication: For Atrial fibrillation    warfarin 5 mg oral tablet  -- 1 tab(s) by mouth Monday through Saturday. Please follow up with cardiology on Monday for PT/INR; keep INR between 2-3.   -- Indication: For Atrial fibrillation    atorvastatin 40 mg oral tablet  -- 1 tab(s) by mouth once a day  -- Indication: For CAD (coronary artery disease)    Metoprolol Succinate  mg oral tablet, extended release  -- 1 tab(s) by mouth once a day  -- Indication: For Atrial fibrillation    Colace 100 mg oral capsule  -- 1 cap(s) by mouth 3 times a day  -- Indication: For Constipation    Augmentin 875 mg-125 mg oral tablet  -- 1 tab(s) by mouth 2 times a day  -- Indication: For Acute diverticulitis, with perforation    Acidophilus oral capsule  -- 1 tab(s) by mouth 2 times a day   -- Indication: For Probiotics    Centrum Silver oral tablet  -- 1 tab(s) by mouth once a day  -- Indication: For Vitamin

## 2018-09-10 NOTE — PROGRESS NOTE ADULT - ASSESSMENT
68 year old M with PMHx of A.fib on Coumadin and digoxin, MI x1 stent (July 2012, LAD) and extensive history of diverticulitis (5 episodes in the past) who presented with LLQ abdominal pain, admitted for perforated diverticulitis. 68 year old M with PMHx of A.fib on Coumadin and digoxin, MI x1 stent (July 2012, LAD) and extensive history of diverticulitis (5 episodes in the past) who presented with LLQ abdominal pain, admitted for perforated diverticulitis. stable.

## 2018-09-10 NOTE — DISCHARGE NOTE ADULT - PATIENT PORTAL LINK FT
You can access the [facility] Patient Portal, offered by [facility], by registering with the following website: [email or web address]

## 2018-09-10 NOTE — CONSULT NOTE ADULT - SUBJECTIVE AND OBJECTIVE BOX
Chief Complaint:  Patient is a 68y old  Male who presents with a chief complaint of abdominal pain 68 year old M with PMHx of A.sohan on Coumadin and digoxin, MI x1 stent (2012, LAD) and extensive history of diverticulitis (5 episodes in the past) who presented with LLQ abdominal pain starting at 4AM today. He describes the pain as sharp and constant. Additionally he was nauseous and "retching this morning". He states he was performing his routine ADL's yesterday. He did not take any medications for the pain. He was unable to take his medications today. He last ate yesterday evening. He reports feeling constipated for the last couple of days but had BM yesterday morning with bleeding  He denies fevers, chills, chest pain, palpitations, sob. Does not remember when his last colonoscopy was performed.     In the ED, the patient's vital signs were T: 98.8, , /88, R: 16, SpO2 98% on RA. EKG: Atrial fibrillation @110bpm. CXR: No evidence of active chest disease. CT abdomen with oral contrast: Perforated diverticulitis with moderate pneumoperitoneum. No intraperitoneal abscess. WBC of 10.76. CMP significant for bili of 1.4. Dr. Oseguera evaluated patient in ED, recommended ICU admission and non-surgical management at this time. Given the possibility of urgent surgery Pt was given K centra in ER, Pt Got IV Fluid Bolus & IV Zosyn x 1 in ER.    Allergies:  lactose (Unknown)  sulfa drugs (Rash)      Medications:  digoxin     Tablet 0.125 milliGRAM(s) Oral daily  enoxaparin Injectable 40 milliGRAM(s) SubCutaneous daily  influenza   Vaccine 0.5 milliLiter(s) IntraMuscular once  metoprolol tartrate 25 milliGRAM(s) Oral every 6 hours  pantoprazole  Injectable 40 milliGRAM(s) IV Push daily  piperacillin/tazobactam IVPB. 3.375 Gram(s) IV Intermittent every 8 hours  potassium acid phosphate/sodium acid phosphate tablet (K-PHOS No. 2) 1 Tablet(s) Oral four times a day with meals  sodium chloride 0.9%. 1000 milliLiter(s) IV Continuous <Continuous>  tamsulosin 0.4 milliGRAM(s) Oral at bedtime      PMHX/PSHX:  Dyslipidemia  Essential hypertension  CAD (coronary artery disease)  Atrial fibrillation  MI (myocardial infarction)  Diverticulitis  Dislocation of right shoulder joint, sequela  S/P tonsillectomy  S/P appendectomy      Family history:  Family history of prostate cancer (Uncle)  Family history of myocardial infarction (Father)      Social History: Social History:    	Marital Status:  ( x  )    (   ) Single    (   )    (  )   	Occupation: retired from [employer]  	Lives with: (  ) alone  (  ) children   ( x ) spouse   (  ) parents  (  ) other    	Substance Use (street drugs): ( x ) never used  (  ) other:  	Tobacco Usage:  (   ) never smoked   ( x  ) former smoker   (   ) current smoker  (  on and off starting at age 14   ) pack year  (    25 years ago    ) last cigarette date  	Alcohol Usage: social EtOH    	Health Management  	Immunization Hx:   	(  x) flu shot                               (  2017   ) date   	( x ) pneumonia shot               (  2017   ) date  (  ) tetanus                               (     ) date Patient believes he is up to date    ROS:     General:  No wt loss, fevers, chills, night sweats, fatigue,   Eyes:  Good vision, no reported pain  ENT:  No sore throat, pain, runny nose, dysphagia  CV:  No pain, palpitations, hypo/hypertension  Resp:  No dyspnea, cough, tachypnea, wheezing  GI:  No pain, No nausea, No vomiting, No diarrhea, No constipation, No weight loss, No fever, No pruritis, No rectal bleeding, No tarry stools, No dysphagia,  :  No pain, bleeding, incontinence, nocturia  Muscle:  No pain, weakness  Neuro:  No weakness, tingling, memory problems  Psych:  No fatigue, insomnia, mood problems, depression  Endocrine:  No polyuria, polydipsia, cold/heat intolerance  Heme:  No petechiae, ecchymosis, easy bruisability  Skin:  No rash, tattoos, scars, edema      PHYSICAL EXAM:   Vital Signs:  Vital Signs Last 24 Hrs  T(C): 36.8 (10 Sep 2018 07:38), Max: 37.7 (09 Sep 2018 23:29)  T(F): 98.3 (10 Sep 2018 07:38), Max: 99.8 (09 Sep 2018 23:29)  HR: 96 (10 Sep 2018 11:42) (80 - 99)  BP: 144/88 (10 Sep 2018 11:42) (132/84 - 163/77)  BP(mean): --  RR: 17 (10 Sep 2018 07:38) (16 - 17)  SpO2: 100% (10 Sep 2018 07:38) (97% - 100%)  Daily     Daily     GENERAL:  Appears stated age, well-groomed, well-nourished, no distress  HEENT:  NC/AT,  conjunctivae clear and pink, no thyromegaly, nodules, adenopathy, no JVD, sclera -anicteric  CHEST:  Full & symmetric excursion, no increased effort, breath sounds clear  HEART:  Regular rhythm, S1, S2, no murmur/rub/S3/S4, no abdominal bruit, no edema  ABDOMEN:  Soft, non-tender, non-distended, normoactive bowel sounds,  no masses ,no hepato-splenomegaly, no signs of chronic liver disease  EXTEREMITIES:  no cyanosis,clubbing or edema  SKIN:  No rash/erythema/ecchymoses/petechiae/wounds/abscess/warm/dry  NEURO:  Alert, oriented, no asterixis, no tremor, no encephalopathy    LABS:                        11.8   7.15  )-----------( 105      ( 10 Sep 2018 06:15 )             34.8     09-10    144  |  108  |  12  ----------------------------<  99  3.6   |  28  |  0.93    Ca    8.7      10 Sep 2018 06:15  Phos  1.6     09-10  Mg     2.2     09-10    TPro  6.7  /  Alb  3.1<L>  /  TBili  1.8<H>  /  DBili  x   /  AST  26  /  ALT  25  /  AlkPhos  63  09-10    LIVER FUNCTIONS - ( 10 Sep 2018 06:15 )  Alb: 3.1 g/dL / Pro: 6.7 g/dL / ALK PHOS: 63 U/L / ALT: 25 U/L / AST: 26 U/L / GGT: x           PT/INR - ( 10 Sep 2018 06:15 )   PT: 13.2 sec;   INR: 1.21 ratio           Urinalysis Basic - ( 08 Sep 2018 13:25 )    Color: Yellow / Appearance: Clear / S.005 / pH: x  Gluc: x / Ketone: Negative  / Bili: Negative / Urobili: Negative   Blood: x / Protein: Negative / Nitrite: Negative   Leuk Esterase: Negative / RBC: x / WBC x   Sq Epi: x / Non Sq Epi: x / Bacteria: x          Imaging:

## 2018-09-10 NOTE — DISCHARGE NOTE ADULT - SECONDARY DIAGNOSIS.
Atrial fibrillation CAD (coronary artery disease) Thrombocytopenia BPH (benign prostatic hyperplasia)

## 2018-09-10 NOTE — PROGRESS NOTE ADULT - PROBLEM SELECTOR PLAN 1
- Transferred from ICU to 2N.  - WBC remains normal and trending down.  - Hepatitis C and HIV testing negative.  - Per surgery Dr Oseguera, non-surgical management, perforation clinically sealed off. Holding pain meds; if patient begins to experience these symptoms, he will need to go to OR. Dr Santana assuming care today.  - Will notify Dr Santana if patient has worsening pain, tachycardia, hypotension.  - Consulted ID Dr Ricardo, continue IV Zosyn 3.375mg q8h.  - NPO, IVF  cc/hr. Per surgery, if remains stable will start on clears. - Transferred from ICU to 2N.  - WBC remains normal and trending down.  - Hepatitis C and HIV testing negative.  - Per surgery Dr Oseguera, non-surgical management, perforation clinically sealed off. Holding pain meds; if patient begins to experience these symptoms, he will need to go to OR.  - Consulted ID Dr Ricardo, continue IV Zosyn 3.375mg q8h.  - Per surgery, NPO one more day, IVF  cc/hr. Per surgery, if remains stable will start on clears. - Transferred from ICU to .  - WBC remains normal and trending down.  - Hepatitis C and HIV testing negative.  - Per surgery Dr Oseguera, non-surgical management, perforation clinically sealed off. Holding pain meds. Advanced to clear liquid diet. IVF  cc/hr.  - Consulted ID Dr Ricardo, continue IV Zosyn 3.375mg q8h. - Transferred from ICU to .  - WBC remains normal and trending down.  - Hepatitis C and HIV testing negative.  - Per surgery Dr Oseguera, non-surgical management, perforation clinically sealed off. Holding pain meds, continue NPO at least 1 more day, IVF  cc/hr.  - Consulted ID Dr Ricardo, continue IV Zosyn 3.375mg q8h. - Transferred from ICU to .  - WBC remains normal and trending down.  - Hepatitis C and HIV testing negative.  - Per surgery Dr Oseguera, non-surgical management, perforation clinically sealed off. Holding pain meds, continue NPO at least 1 more day, IVF  cc/hr.  - Consulted ID Dr Ricardo, continue IV Zosyn 3.375mg q8h.  D/C DR Oseguera - NPO today, Possible clears tomorrow.  GI DR Tobin on case D/W

## 2018-09-10 NOTE — DISCHARGE NOTE ADULT - CARE PLAN
Principal Discharge DX:	Diverticulitis of large intestine with perforation, unspecified bleeding status Principal Discharge DX:	Diverticulitis of large intestine with perforation, unspecified bleeding status  Goal:	Resolution  Assessment and plan of treatment:	- CT abdomen with oral contrast showed perforated diverticulitis with moderate pneumoperitoneum; no intraperitoneal abscess. You were treated with IV Zosyn.  - Please follow up as outpatient with surgeon for further management.  Secondary Diagnosis:	Atrial fibrillation  Assessment and plan of treatment:	- Please continue Digoxin and Coumadin.  - Please follow up as outpatient with cardiology and PMD for further management.  Secondary Diagnosis:	CAD (coronary artery disease)  Assessment and plan of treatment:	- Please continue Atorvastatin, Ecotrin, Metoprolol.  - Please follow up as outpatient with cardiology and PMD for further management.  Secondary Diagnosis:	Thrombocytopenia  Assessment and plan of treatment:	- You had low platelet count, likely secondary to acute perforated diverticulitis. Platelet count improved through your hospital stay.  - Please follow up as outpatient with PMD for further management. Principal Discharge DX:	Diverticulitis of large intestine with perforation, unspecified bleeding status  Goal:	Resolution  Assessment and plan of treatment:	- CT abdomen with oral contrast showed perforated diverticulitis with moderate pneumoperitoneum; no intraperitoneal abscess. You were treated with IV Zosyn.  - Take antibiotic (Augmentin 875mg twice a day for 7 days) and Colace (stool softener).  - Take Bacid probiotic (3 weeks).  - Follow up as outpatient with GI or surgery for outpatient colonoscopy in 8 weeks.  - Follow up with surgeon in 2 weeks for further scheduling surgical intervention.  - Return early to hospital if you experience abdominal pain or fever.  Secondary Diagnosis:	Atrial fibrillation  Assessment and plan of treatment:	- Please continue Digoxin and home dosage of Coumadin. Please follow up with cardiology to check PT/INR on Monday. Keep INR between 2-3.  - Follow up as outpatient with cardiology and PMD for further management.  Secondary Diagnosis:	CAD (coronary artery disease)  Assessment and plan of treatment:	- Please continue Atorvastatin, Ecotrin, Toprol XL.  - Follow up as outpatient with cardiology and PMD for further management.  Secondary Diagnosis:	Thrombocytopenia  Assessment and plan of treatment:	- You had low platelet count, likely secondary to acute perforated diverticulitis. Platelet count improved through your hospital stay.  - Follow up as outpatient with PMD for repeat CBC in 1 week. Principal Discharge DX:	Diverticulitis of large intestine with perforation, unspecified bleeding status  Goal:	Resolution  Assessment and plan of treatment:	- CT abdomen with oral contrast showed perforated diverticulitis with moderate pneumoperitoneum; no intraperitoneal abscess. You were treated with IV Zosyn.  - Take antibiotic (Augmentin 875mg twice a day for 7 days) and Colace (stool softener) to avoid Constipation.  - Take Bacid probiotic (3 weeks).  - Follow up as outpatient with GI or surgery for outpatient colonoscopy in 8 weeks.  - Follow up with surgeon in 2 weeks for scheduling surgical intervention.  - Return early to hospital if you experience abdominal pain or fever.  Secondary Diagnosis:	Atrial fibrillation  Assessment and plan of treatment:	-  continue Digoxin, Toprol XL and home dosage of Coumadin.  - follow up with cardiology to check PT/INR on Monday. Keep INR between 2-3.  - Follow up as outpatient with cardiology and PMD for further management.  Secondary Diagnosis:	CAD (coronary artery disease)  Assessment and plan of treatment:	-  continue Atorvastatin, Ecotrin, Toprol XL.  - Follow up as outpatient with cardiology and PMD for further management.  Secondary Diagnosis:	Thrombocytopenia  Assessment and plan of treatment:	- You had low platelet count, likely secondary to acute perforated diverticulitis.   Platelet count improved through your hospital stay.  - Follow up as outpatient with PMD for repeat CBC in 1 week.  Secondary Diagnosis:	BPH (benign prostatic hyperplasia)  Assessment and plan of treatment:	On Alfuzosin daily

## 2018-09-10 NOTE — PROGRESS NOTE ADULT - SUBJECTIVE AND OBJECTIVE BOX
Patient is a 68y old  Male who presents with a chief complaint of abdominal pain (09 Sep 2018 08:36)      INTERVAL HPI:  68 year old M with PMHx of Ju on Coumadin and digoxin, MI x1 stent (2012, LAD) and extensive history of diverticulitis (5 episodes in the past) who presented with LLQ abdominal pain starting at 4AM today. He describes the pain as sharp and constant. Additionally he was nauseous and "retching this morning". He states he was performing his routine ADL's yesterday. He did not take any medications for the pain. He was unable to take his medications today. He last ate yesterday evening. He reports feeling constipated for the last couple of days but had BM yesterday morning with bleeding  He denies fevers, chills, chest pain, palpitations, sob. Does not remember when his last colonoscopy was performed.     In the ED, the patient's vital signs were T: 98.8, , /88, R: 16, SpO2 98% on RA. EKG: Atrial fibrillation @110bpm. CXR: No evidence of active chest disease. CT abdomen with oral contrast: Perforated diverticulitis with moderate pneumoperitoneum. No intraperitoneal abscess. WBC of 10.76. CMP significant for bili of 1.4. Dr. Oseguera evaluated patient in ED, recommended ICU admission and non-surgical management at this time. Given the possibility of urgent surgery Pt was given K centra in ER, Pt Got IV Fluid Bolus & IV Zosyn x 1 in ER.    18: Pt seen, examined in ICU, Feels a lot better today, NPO, IV Fluids, IV Abx Pt had a BM. WBC- Normal.  9/10/18:     OVERNIGHT EVENTS: Per chart, RN reported finding patient on the floor of his room on his left side, patient reported falling on his buttock/left thigh. Patient refused all imaging.    Home Medications:  alfuzosin 10 mg oral tablet, extended release: 1 tab(s) orally once a day (08 Sep 2018 16:31)  atorvastatin 40 mg oral tablet: 1 tab(s) orally once a day (08 Sep 2018 16:31)  Centrum Silver oral tablet: 1 tab(s) orally once a day (08 Sep 2018 16:31)  digoxin 125 mcg (0.125 mg) oral tablet: 1 tab(s) orally once a day (08 Sep 2018 16:31)  Ecotrin Adult Low Strength 81 mg oral delayed release tablet: 1 tab(s) orally once a day (08 Sep 2018 16:31)  Metoprolol Succinate  mg oral tablet, extended release: 1 tab(s) orally once a day (08 Sep 2018 16:31)  warfarin 5 mg oral tablet: 1 tab(s) orally Monday through Saturday  (08 Sep 2018 16:31)  warfarin 7.5 mg oral tablet: orally every  (08 Sep 2018 16:31)    MEDICATIONS  (STANDING):  digoxin     Tablet 0.125 milliGRAM(s) Oral daily  enoxaparin Injectable 40 milliGRAM(s) SubCutaneous daily  influenza   Vaccine 0.5 milliLiter(s) IntraMuscular once  metoprolol tartrate 25 milliGRAM(s) Oral every 6 hours  pantoprazole  Injectable 40 milliGRAM(s) IV Push daily  piperacillin/tazobactam IVPB. 3.375 Gram(s) IV Intermittent every 8 hours  sodium chloride 0.9%. 1000 milliLiter(s) (100 mL/Hr) IV Continuous <Continuous>  tamsulosin 0.4 milliGRAM(s) Oral at bedtime    MEDICATIONS  (PRN):      Allergies    sulfa drugs (Rash)    Intolerances    lactose (Unknown)      REVIEW OF SYSTEMS:  CONSTITUTIONAL: No fever, No chills, No fatigue, No myalgia, No Body ache, No Weakness  EYES: No eye pain,  No visual disturbances, No discharge, No Redness  ENMT:  No ear pain, No nose bleed, No vertigo; No sinus or throat pain, No Congestion  NECK: No pain, No stiffness  RESPIRATORY: No cough, wheezing, No  hemoptysis, No shortness of breath  CARDIOVASCULAR: No chest pain, palpitations  GASTROINTESTINAL: MILD LLQ/ abdominal pain, No epigastric pain. No nausea, No vomiting; No diarrhea or constipation. [ x ] BM  GENITOURINARY: No dysuria, No frequency, No urgency, No hematuria, or incontinence  NEUROLOGICAL: No headaches, No dizziness, No numbness, No tingling, No tremors, No weakness  EXT: No Swelling, No Pain, No Edema  SKIN:  [ x ] No itching, burning, rashes, or lesions   MUSCULOSKELETAL: No joint pain or swelling; No muscle pain, No back pain, No extremity pain  PSYCHIATRIC: No depression, anxiety, mood swings or difficulty sleeping at night  PAIN SCALE: [  ] None  [ x ] Other-Soreness LLQ 4-5/10  ROS Unable to obtain due to - [  ] Dementia  [  ] Lethargy  [  ] Sedated [  ] non verbal  REST OF REVIEW Of SYSTEM - [ x ] Normal     Vital Signs Last 24 Hrs  T(C): 36.8 (10 Sep 2018 07:38), Max: 37.7 (09 Sep 2018 23:29)  T(F): 98.3 (10 Sep 2018 07:38), Max: 99.8 (09 Sep 2018 23:29)  HR: 80 (10 Sep 2018 07:38) (76 - 99)  BP: 132/84 (10 Sep 2018 07:38) (101/55 - 163/77)  BP(mean): 100 (09 Sep 2018 11:22) (76 - 100)  RR: 17 (10 Sep 2018 07:38) (15 - 17)  SpO2: 100% (10 Sep 2018 07:38) (97% - 100%)      I&O's Summary    09 Sep 2018 07:01  -  10 Sep 2018 07:00  --------------------------------------------------------  IN: 1100 mL / OUT: 1250 mL / NET: -150 mL      PHYSICAL EXAM:  GENERAL:  [ x ] NAD , [ x ] well appearing, [  ] Agitated, [  ] Drowsy,  [  ] Lethargy, [  ] confused   HEAD:  [ x ] Normal, [  ] Other  EYES:  [ x ] EOMI, [ x ] PERRLA, [ x ] conjunctiva and sclera clear normal, [  ] Other,  [  ] Pallor, [  ] Discharge  ENMT:  [ x ] Normal, [ x ] Moist mucous membranes, [  ] Good dentition, [  ] No Thrush  NECK:  [ x ] Supple, [ x ] No JVD, [ x ] Normal thyroid, [  ] Lymphadenopathy [  ] Other  CHEST/LUNG:  [ x ] Clear to auscultation bilaterally, [  ] Breath Sounds equal OR Decrease,  [  ] No rales, [  ] No rhonchi  [  ]  No wheezing  HEART:  [  ] Regular rate and rhythm, [  ] tachycardia, [  ] Bradycardia,  [ x ] irregular  [ x ] No murmurs, No rubs, No gallops, [  ] PPM in place (Mfr:  )  ABDOMEN:  [ x ] Soft, [ x ] Nontender, [x  ] Nondistended, [ x ] No mass, [ x ] Bowel sounds present, [  ] obese, +Soreness LLQ , NO R/R/G  NERVOUS SYSTEM:  [  ] Alert & Oriented X3, [  ] Nonfocal  [  ] Confusion  [  ] Encephalopathic [  ] Sedated [  ] Unable to assess, [  ] Other-  EXTREMITIES: [ x ] 2+ Peripheral Pulses, No clubbing, No cyanosis,  [  ] edema B/L lower EXT. [  ] PVD stasis skin changes B/L Lower EXT  LYMPH: No lymphadenopathy noted  SKIN:  [ x ] No rashes or lesions, [  ] Pressure Ulcers, [  ] ecchymosis, [  ] Skin Tears, [  ] Other    DIET: NPO    LABS:                        11.8   7.15  )-----------( 105      ( 10 Sep 2018 06:15 )             34.8     10 Sep 2018 06:15    144    |  108    |  12     ----------------------------<  99     3.6     |  28     |  0.93     Ca    8.7        10 Sep 2018 06:15  Phos  1.6       10 Sep 2018 06:15  Mg     2.2       10 Sep 2018 06:15    TPro  6.7    /  Alb  3.1    /  TBili  1.8    /  DBili  x      /  AST  26     /  ALT  25     /  AlkPhos  63     10 Sep 2018 06:15    LIVER FUNCTIONS - ( 10 Sep 2018 06:15 )  Alb: 3.1 g/dL / Pro: 6.7 g/dL / ALK PHOS: 63 U/L / ALT: 25 U/L / AST: 26 U/L / GGT: x           PT/INR - ( 10 Sep 2018 06:15 )   PT: 13.2 sec;   INR: 1.21 ratio    PTT - ( 08 Sep 2018 11:55 )  PTT:34.7 sec    Urinalysis Basic - ( 08 Sep 2018 13:25 )  Color: Yellow / Appearance: Clear / S.005 / pH: x  Gluc: x / Ketone: Negative  / Bili: Negative / Urobili: Negative   Blood: x / Protein: Negative / Nitrite: Negative   Leuk Esterase: Negative / RBC: x / WBC x   Sq Epi: x / Non Sq Epi: x / Bacteria: x      Culture - Blood (collected 08 Sep 2018 18:53)  Source: .Blood Blood  Preliminary Report (09 Sep 2018 19:01):    No growth to date.    Culture - Blood (collected 08 Sep 2018 18:53)  Source: .Blood Blood  Preliminary Report (09 Sep 2018 19:01):    No growth to date.    Culture - Urine (collected 08 Sep 2018 16:49)  Source: .Urine Clean Catch (Midstream)  Final Report (09 Sep 2018 22:22):    <10,000 CFU/ml    Normal Urogenital bryce present      RADIOLOGY & ADDITIONAL TESTS: NONE      HEALTH ISSUES - PROBLEM Dx:  Need for prophylactic measure: Need for prophylactic measure  History of myocardial infarction: History of myocardial infarction  Atrial fibrillation: Atrial fibrillation  Diverticulitis of large intestine with perforation, unspecified bleeding status: Diverticulitis of large intestine with perforation, unspecified bleeding status        Consultant(s) Notes Reviewed:  [ x ] YES     Care Discussed with [ x ] Consultants  [ x ] Patient  [  ] Family  [  ]   [  ] Social Service  [ x ] RN, [  ] Physical Therapy  DVT PPX: [ x ] Lovenox, [  ] S C Heparin, [  ] Coumadin, [  ] Xarelto, [  ] Eliquis, [  ] Pradaxa, [  ] IV Heparin drip, [ x ] SCD [  ] Contraindication 2 to GI Bleed, [  ] Ambulation  Advanced directive: [ x ] None, [  ] DNR/DNI Patient is a 68y old  Male who presents with a chief complaint of abdominal pain (09 Sep 2018 08:36)      INTERVAL HPI:  68 year old M with PMHx of Ju on Coumadin and digoxin, MI x1 stent (2012, LAD) and extensive history of diverticulitis (5 episodes in the past) who presented with LLQ abdominal pain starting at 4AM today. He describes the pain as sharp and constant. Additionally he was nauseous and "retching this morning". He states he was performing his routine ADL's yesterday. He did not take any medications for the pain. He was unable to take his medications today. He last ate yesterday evening. He reports feeling constipated for the last couple of days but had BM yesterday morning with bleeding  He denies fevers, chills, chest pain, palpitations, sob. Does not remember when his last colonoscopy was performed.     In the ED, the patient's vital signs were T: 98.8, , /88, R: 16, SpO2 98% on RA. EKG: Atrial fibrillation @110bpm. CXR: No evidence of active chest disease. CT abdomen with oral contrast: Perforated diverticulitis with moderate pneumoperitoneum. No intraperitoneal abscess. WBC of 10.76. CMP significant for bili of 1.4. Dr. Oseguera evaluated patient in ED, recommended ICU admission and non-surgical management at this time. Given the possibility of urgent surgery Pt was given K centra in ER, Pt Got IV Fluid Bolus & IV Zosyn x 1 in ER.    18: Pt seen, examined in ICU, Feels a lot better today, NPO, IV Fluids, IV Abx Pt had a BM. WBC- Normal.  9/10/18: Patient seen and examined at bedside in 2E. Patient stated that he had a BM yesterday, is passing flatus, and LLQ pain is significantly improved. He reported that he fell when getting up to use the bathroom last night, attributed it to taking Ambien for trouble sleeping. Patient stated that he feels fine now, having no pain, and does not want imaging.    OVERNIGHT EVENTS: Per chart, RN reported finding patient on the floor of his room on his left side, patient reported falling on his buttock/left thigh. Patient refused all imaging.    Home Medications:  alfuzosin 10 mg oral tablet, extended release: 1 tab(s) orally once a day (08 Sep 2018 16:31)  atorvastatin 40 mg oral tablet: 1 tab(s) orally once a day (08 Sep 2018 16:31)  Centrum Silver oral tablet: 1 tab(s) orally once a day (08 Sep 2018 16:31)  digoxin 125 mcg (0.125 mg) oral tablet: 1 tab(s) orally once a day (08 Sep 2018 16:31)  Ecotrin Adult Low Strength 81 mg oral delayed release tablet: 1 tab(s) orally once a day (08 Sep 2018 16:31)  Metoprolol Succinate  mg oral tablet, extended release: 1 tab(s) orally once a day (08 Sep 2018 16:31)  warfarin 5 mg oral tablet: 1 tab(s) orally Monday through Saturday  (08 Sep 2018 16:31)  warfarin 7.5 mg oral tablet: orally every  (08 Sep 2018 16:31)    MEDICATIONS  (STANDING):  digoxin     Tablet 0.125 milliGRAM(s) Oral daily  enoxaparin Injectable 40 milliGRAM(s) SubCutaneous daily  influenza   Vaccine 0.5 milliLiter(s) IntraMuscular once  metoprolol tartrate 25 milliGRAM(s) Oral every 6 hours  pantoprazole  Injectable 40 milliGRAM(s) IV Push daily  piperacillin/tazobactam IVPB. 3.375 Gram(s) IV Intermittent every 8 hours  sodium chloride 0.9%. 1000 milliLiter(s) (100 mL/Hr) IV Continuous <Continuous>  tamsulosin 0.4 milliGRAM(s) Oral at bedtime    MEDICATIONS  (PRN):      Allergies    sulfa drugs (Rash)    Intolerances    lactose (Unknown)      REVIEW OF SYSTEMS:  CONSTITUTIONAL: No fever, No chills, No fatigue, No myalgia, No Body ache, No Weakness  EYES: No eye pain,  No visual disturbances, No discharge, No Redness  ENMT:  No ear pain, No nose bleed, No vertigo; No sinus or throat pain, No Congestion  NECK: No pain, No stiffness  RESPIRATORY: No cough, wheezing, No  hemoptysis, No shortness of breath  CARDIOVASCULAR: No chest pain, palpitations  GASTROINTESTINAL: Mild LLQ pain (improved), No epigastric pain. No nausea, No vomiting; No diarrhea or constipation. [ x ] BM  GENITOURINARY: No dysuria, No frequency, No urgency, No hematuria, or incontinence  NEUROLOGICAL: No headaches, No dizziness, No numbness, No tingling, No tremors, No weakness  EXT: No Swelling, No Pain, No Edema  SKIN:  [ x ] No itching, burning, rashes, or lesions   MUSCULOSKELETAL: No joint pain or swelling; No muscle pain, No back pain, No extremity pain  PSYCHIATRIC: No depression, anxiety, mood swings or difficulty sleeping at night  PAIN SCALE: [  ] None  [ x ] Other-Soreness LLQ 1/10  ROS Unable to obtain due to - [  ] Dementia  [  ] Lethargy  [  ] Sedated [  ] non verbal  REST OF REVIEW Of SYSTEM - [ x ] Normal     Vital Signs Last 24 Hrs  T(C): 36.8 (10 Sep 2018 07:38), Max: 37.7 (09 Sep 2018 23:29)  T(F): 98.3 (10 Sep 2018 07:38), Max: 99.8 (09 Sep 2018 23:29)  HR: 80 (10 Sep 2018 07:38) (76 - 99)  BP: 132/84 (10 Sep 2018 07:38) (101/55 - 163/77)  BP(mean): 100 (09 Sep 2018 11:22) (76 - 100)  RR: 17 (10 Sep 2018 07:38) (15 - 17)  SpO2: 100% (10 Sep 2018 07:38) (97% - 100%)      I&O's Summary    09 Sep 2018 07:01  -  10 Sep 2018 07:00  --------------------------------------------------------  IN: 1100 mL / OUT: 1250 mL / NET: -150 mL      PHYSICAL EXAM:  GENERAL:  [ x ] NAD , [ x ] well appearing, [  ] Agitated, [  ] Drowsy,  [  ] Lethargy, [  ] confused   HEAD:  [ x ] Normal, [  ] Other  EYES:  [ x ] EOMI, [ x ] PERRLA, [ x ] conjunctiva and sclera clear normal, [  ] Other,  [  ] Pallor, [  ] Discharge  ENMT:  [ x ] Normal, [ x ] Moist mucous membranes, [  ] Good dentition, [  ] No Thrush  NECK:  [ x ] Supple, [ x ] No JVD, [ x ] Normal thyroid, [  ] Lymphadenopathy [  ] Other  CHEST/LUNG:  [ x ] Clear to auscultation bilaterally, [ x ] Breath Sounds equal,  [  ] No rales, [  ] No rhonchi  [  ]  No wheezing  HEART:  [  ] Regular rate and rhythm, [  ] tachycardia, [  ] Bradycardia,  [ x ] irregular  [ x ] No murmurs, No rubs, No gallops, [  ] PPM in place (Mfr:  )  ABDOMEN:  [ x ] Soft, [ x ] Sore/mildly tender to palpation at LLQ rated 1/10, [ x ] Nondistended, [ x ] No mass, [ x ] Bowel sounds present, [  ] obese, No R/R/G  NERVOUS SYSTEM:  [ x ] Alert & Oriented X3, [ x ] Nonfocal  [  ] Confusion  [  ] Encephalopathic [  ] Sedated [  ] Unable to assess, [  ] Other-  EXTREMITIES: [ x ] 2+ Peripheral Pulses, No clubbing, No cyanosis,  [  ] edema B/L lower EXT. [  ] PVD stasis skin changes B/L Lower EXT  LYMPH: No lymphadenopathy noted  SKIN:  [ x ] No rashes or lesions, [  ] Pressure Ulcers, [  ] ecchymosis, [  ] Skin Tears, [  ] Other    DIET: Clear liquid    LABS:                        11.8   7.15  )-----------( 105      ( 10 Sep 2018 06:15 )             34.8     10 Sep 2018 06:15    144    |  108    |  12     ----------------------------<  99     3.6     |  28     |  0.93     Ca    8.7        10 Sep 2018 06:15  Phos  1.6       10 Sep 2018 06:15  Mg     2.2       10 Sep 2018 06:15    TPro  6.7    /  Alb  3.1    /  TBili  1.8    /  DBili  x      /  AST  26     /  ALT  25     /  AlkPhos  63     10 Sep 2018 06:15    LIVER FUNCTIONS - ( 10 Sep 2018 06:15 )  Alb: 3.1 g/dL / Pro: 6.7 g/dL / ALK PHOS: 63 U/L / ALT: 25 U/L / AST: 26 U/L / GGT: x           PT/INR - ( 10 Sep 2018 06:15 )   PT: 13.2 sec;   INR: 1.21 ratio    PTT - ( 08 Sep 2018 11:55 )  PTT:34.7 sec    Urinalysis Basic - ( 08 Sep 2018 13:25 )  Color: Yellow / Appearance: Clear / S.005 / pH: x  Gluc: x / Ketone: Negative  / Bili: Negative / Urobili: Negative   Blood: x / Protein: Negative / Nitrite: Negative   Leuk Esterase: Negative / RBC: x / WBC x   Sq Epi: x / Non Sq Epi: x / Bacteria: x      Culture - Blood (collected 08 Sep 2018 18:53)  Source: .Blood Blood  Preliminary Report (09 Sep 2018 19:01):    No growth to date.    Culture - Blood (collected 08 Sep 2018 18:53)  Source: .Blood Blood  Preliminary Report (09 Sep 2018 19:01):    No growth to date.    Culture - Urine (collected 08 Sep 2018 16:49)  Source: .Urine Clean Catch (Midstream)  Final Report (09 Sep 2018 22:22):    <10,000 CFU/ml    Normal Urogenital bryce present      RADIOLOGY & ADDITIONAL TESTS: NONE      HEALTH ISSUES - PROBLEM Dx:  Need for prophylactic measure: Need for prophylactic measure  History of myocardial infarction: History of myocardial infarction  Atrial fibrillation: Atrial fibrillation  Diverticulitis of large intestine with perforation, unspecified bleeding status: Diverticulitis of large intestine with perforation, unspecified bleeding status        Consultant(s) Notes Reviewed:  [ x ] YES     Care Discussed with [ x ] Consultants  [ x ] Patient  [  ] Family  [  ]   [  ] Social Service  [ x ] RN, [  ] Physical Therapy  DVT PPX: [ x ] Lovenox, [  ] S C Heparin, [  ] Coumadin, [  ] Xarelto, [  ] Eliquis, [  ] Pradaxa, [  ] IV Heparin drip, [ x ] SCD [  ] Contraindication 2 to GI Bleed, [  ] Ambulation  Advanced directive: [ x ] None, [  ] DNR/DNI Patient is a 68y old  Male who presents with a chief complaint of abdominal pain (09 Sep 2018 08:36)      INTERVAL HPI:  68 year old M with PMHx of Ju on Coumadin and digoxin, MI x1 stent (2012, LAD) and extensive history of diverticulitis (5 episodes in the past) who presented with LLQ abdominal pain starting at 4AM today. He describes the pain as sharp and constant. Additionally he was nauseous and "retching this morning". He states he was performing his routine ADL's yesterday. He did not take any medications for the pain. He was unable to take his medications today. He last ate yesterday evening. He reports feeling constipated for the last couple of days but had BM yesterday morning with bleeding  He denies fevers, chills, chest pain, palpitations, sob. Does not remember when his last colonoscopy was performed.     In the ED, the patient's vital signs were T: 98.8, , /88, R: 16, SpO2 98% on RA. EKG: Atrial fibrillation @110bpm. CXR: No evidence of active chest disease. CT abdomen with oral contrast: Perforated diverticulitis with moderate pneumoperitoneum. No intraperitoneal abscess. WBC of 10.76. CMP significant for bili of 1.4. Dr. Oseguera evaluated patient in ED, recommended ICU admission and non-surgical management at this time. Given the possibility of urgent surgery Pt was given K centra in ER, Pt Got IV Fluid Bolus & IV Zosyn x 1 in ER.    18: Pt seen, examined in ICU, Feels a lot better today, NPO, IV Fluids, IV Abx Pt had a BM. WBC- Normal.  9/10/18: Patient seen and examined at bedside in 2E. Patient stated that he had a BM yesterday, is passing flatus, and LLQ pain is significantly improved. He reported that he fell when getting up to use the bathroom last night, attributed it to taking Ambien for trouble sleeping. Patient stated that he feels fine now, having no pain, and does not want imaging.    OVERNIGHT EVENTS: Per chart, RN reported finding patient on the floor of his room on his left side, patient reported falling on his buttock/left thigh. Patient refused all imaging.    Home Medications:  alfuzosin 10 mg oral tablet, extended release: 1 tab(s) orally once a day (08 Sep 2018 16:31)  atorvastatin 40 mg oral tablet: 1 tab(s) orally once a day (08 Sep 2018 16:31)  Centrum Silver oral tablet: 1 tab(s) orally once a day (08 Sep 2018 16:31)  digoxin 125 mcg (0.125 mg) oral tablet: 1 tab(s) orally once a day (08 Sep 2018 16:31)  Ecotrin Adult Low Strength 81 mg oral delayed release tablet: 1 tab(s) orally once a day (08 Sep 2018 16:31)  Metoprolol Succinate  mg oral tablet, extended release: 1 tab(s) orally once a day (08 Sep 2018 16:31)  warfarin 5 mg oral tablet: 1 tab(s) orally Monday through Saturday  (08 Sep 2018 16:31)  warfarin 7.5 mg oral tablet: orally every  (08 Sep 2018 16:31)    MEDICATIONS  (STANDING):  digoxin     Tablet 0.125 milliGRAM(s) Oral daily  enoxaparin Injectable 40 milliGRAM(s) SubCutaneous daily  influenza   Vaccine 0.5 milliLiter(s) IntraMuscular once  metoprolol tartrate 25 milliGRAM(s) Oral every 6 hours  pantoprazole  Injectable 40 milliGRAM(s) IV Push daily  piperacillin/tazobactam IVPB. 3.375 Gram(s) IV Intermittent every 8 hours  sodium chloride 0.9%. 1000 milliLiter(s) (100 mL/Hr) IV Continuous <Continuous>  tamsulosin 0.4 milliGRAM(s) Oral at bedtime    MEDICATIONS  (PRN):      Allergies    sulfa drugs (Rash)    Intolerances    lactose (Unknown)      REVIEW OF SYSTEMS:  CONSTITUTIONAL: No fever, No chills, No fatigue, No myalgia, No Body ache, No Weakness  EYES: No eye pain,  No visual disturbances, No discharge, No Redness  ENMT:  No ear pain, No nose bleed, No vertigo; No sinus or throat pain, No Congestion  NECK: No pain, No stiffness  RESPIRATORY: No cough, wheezing, No  hemoptysis, No shortness of breath  CARDIOVASCULAR: No chest pain, palpitations  GASTROINTESTINAL: Mild LLQ pain (improved), No epigastric pain. No nausea, No vomiting; No diarrhea or constipation. [ x ] BM  GENITOURINARY: No dysuria, No frequency, No urgency, No hematuria, or incontinence  NEUROLOGICAL: No headaches, No dizziness, No numbness, No tingling, No tremors, No weakness  EXT: No Swelling, No Pain, No Edema  SKIN:  [ x ] No itching, burning, rashes, or lesions   MUSCULOSKELETAL: No joint pain or swelling; No muscle pain, No back pain, No extremity pain  PSYCHIATRIC: No depression, anxiety, mood swings or difficulty sleeping at night  PAIN SCALE: [  ] None  [ x ] Other-Soreness LLQ 1/10  ROS Unable to obtain due to - [  ] Dementia  [  ] Lethargy  [  ] Sedated [  ] non verbal  REST OF REVIEW Of SYSTEM - [ x ] Normal     Vital Signs Last 24 Hrs  T(C): 36.8 (10 Sep 2018 07:38), Max: 37.7 (09 Sep 2018 23:29)  T(F): 98.3 (10 Sep 2018 07:38), Max: 99.8 (09 Sep 2018 23:29)  HR: 80 (10 Sep 2018 07:38) (76 - 99)  BP: 132/84 (10 Sep 2018 07:38) (101/55 - 163/77)  BP(mean): 100 (09 Sep 2018 11:22) (76 - 100)  RR: 17 (10 Sep 2018 07:38) (15 - 17)  SpO2: 100% (10 Sep 2018 07:38) (97% - 100%)      I&O's Summary    09 Sep 2018 07:01  -  10 Sep 2018 07:00  --------------------------------------------------------  IN: 1100 mL / OUT: 1250 mL / NET: -150 mL      PHYSICAL EXAM:  GENERAL:  [ x ] NAD , [ x ] well appearing, [  ] Agitated, [  ] Drowsy,  [  ] Lethargy, [  ] confused   HEAD:  [ x ] Normal, [  ] Other  EYES:  [ x ] EOMI, [ x ] PERRLA, [ x ] conjunctiva and sclera clear normal, [  ] Other,  [  ] Pallor, [  ] Discharge  ENMT:  [ x ] Normal, [ x ] Moist mucous membranes, [  ] Good dentition, [  ] No Thrush  NECK:  [ x ] Supple, [ x ] No JVD, [ x ] Normal thyroid, [  ] Lymphadenopathy [  ] Other  CHEST/LUNG:  [ x ] Clear to auscultation bilaterally, [ x ] Breath Sounds equal,  [  ] No rales, [  ] No rhonchi  [  ]  No wheezing  HEART:  [  ] Regular rate and rhythm, [  ] tachycardia, [  ] Bradycardia,  [ x ] irregular  [ x ] No murmurs, No rubs, No gallops, [  ] PPM in place (Mfr:  )  ABDOMEN:  [ x ] Soft, [ x ] Sore/mildly tender to palpation at LLQ rated 1/10, [ x ] Nondistended, [ x ] No mass, [ x ] Bowel sounds present, [  ] obese, No R/R/G  NERVOUS SYSTEM:  [ x ] Alert & Oriented X3, [ x ] Nonfocal  [  ] Confusion  [  ] Encephalopathic [  ] Sedated [  ] Unable to assess, [  ] Other-  EXTREMITIES: [ x ] 2+ Peripheral Pulses, No clubbing, No cyanosis,  [  ] edema B/L lower EXT. [  ] PVD stasis skin changes B/L Lower EXT  LYMPH: No lymphadenopathy noted  SKIN:  [ x ] No rashes or lesions, [  ] Pressure Ulcers, [ x ] R buttock hematoma, [  ] Skin Tears, [ x ] Other- L lateral thigh nodule    DIET: Clear liquid    LABS:                        11.8   7.15  )-----------( 105      ( 10 Sep 2018 06:15 )             34.8     10 Sep 2018 06:15    144    |  108    |  12     ----------------------------<  99     3.6     |  28     |  0.93     Ca    8.7        10 Sep 2018 06:15  Phos  1.6       10 Sep 2018 06:15  Mg     2.2       10 Sep 2018 06:15    TPro  6.7    /  Alb  3.1    /  TBili  1.8    /  DBili  x      /  AST  26     /  ALT  25     /  AlkPhos  63     10 Sep 2018 06:15    LIVER FUNCTIONS - ( 10 Sep 2018 06:15 )  Alb: 3.1 g/dL / Pro: 6.7 g/dL / ALK PHOS: 63 U/L / ALT: 25 U/L / AST: 26 U/L / GGT: x           PT/INR - ( 10 Sep 2018 06:15 )   PT: 13.2 sec;   INR: 1.21 ratio    PTT - ( 08 Sep 2018 11:55 )  PTT:34.7 sec    Urinalysis Basic - ( 08 Sep 2018 13:25 )  Color: Yellow / Appearance: Clear / S.005 / pH: x  Gluc: x / Ketone: Negative  / Bili: Negative / Urobili: Negative   Blood: x / Protein: Negative / Nitrite: Negative   Leuk Esterase: Negative / RBC: x / WBC x   Sq Epi: x / Non Sq Epi: x / Bacteria: x      Culture - Blood (collected 08 Sep 2018 18:53)  Source: .Blood Blood  Preliminary Report (09 Sep 2018 19:01):    No growth to date.    Culture - Blood (collected 08 Sep 2018 18:53)  Source: .Blood Blood  Preliminary Report (09 Sep 2018 19:01):    No growth to date.    Culture - Urine (collected 08 Sep 2018 16:49)  Source: .Urine Clean Catch (Midstream)  Final Report (09 Sep 2018 22:22):    <10,000 CFU/ml    Normal Urogenital bryce present      RADIOLOGY & ADDITIONAL TESTS: NONE      HEALTH ISSUES - PROBLEM Dx:  Need for prophylactic measure: Need for prophylactic measure  History of myocardial infarction: History of myocardial infarction  Atrial fibrillation: Atrial fibrillation  Diverticulitis of large intestine with perforation, unspecified bleeding status: Diverticulitis of large intestine with perforation, unspecified bleeding status        Consultant(s) Notes Reviewed:  [ x ] YES     Care Discussed with [ x ] Consultants  [ x ] Patient  [  ] Family  [  ]   [  ] Social Service  [ x ] RN, [  ] Physical Therapy  DVT PPX: [ x ] Lovenox, [  ] S C Heparin, [  ] Coumadin, [  ] Xarelto, [  ] Eliquis, [  ] Pradaxa, [  ] IV Heparin drip, [ x ] SCD [  ] Contraindication 2 to GI Bleed, [  ] Ambulation  Advanced directive: [ x ] None, [  ] DNR/DNI Patient is a 68y old  Male who presents with a chief complaint of abdominal pain (09 Sep 2018 08:36)      INTERVAL HPI:  68 year old M with PMHx of A.fib on Coumadin and digoxin, MI x1 stent (2012, LAD) and extensive history of diverticulitis (5 episodes in the past) who presented with LLQ abdominal pain starting at 4AM today. He describes the pain as sharp and constant. Additionally he was nauseous and "retching this morning". He states he was performing his routine ADL's yesterday. He did not take any medications for the pain. He was unable to take his medications today. He last ate yesterday evening. He reports feeling constipated for the last couple of days but had BM yesterday morning with bleeding  He denies fevers, chills, chest pain, palpitations, sob. Does not remember when his last colonoscopy was performed.     In the ED, the patient's vital signs were T: 98.8, , /88, R: 16, SpO2 98% on RA. EKG: Atrial fibrillation @110bpm. CXR: No evidence of active chest disease. CT abdomen with oral contrast: Perforated diverticulitis with moderate pneumoperitoneum. No intraperitoneal abscess. WBC of 10.76. CMP significant for bili of 1.4. Dr. Oseguera evaluated patient in ED, recommended ICU admission and non-surgical management at this time. Given the possibility of urgent surgery Pt was given K centra in ER, Pt Got IV Fluid Bolus & IV Zosyn x 1 in ER.    18: Pt seen, examined in ICU, Feels a lot better today, NPO, IV Fluids, IV Abx Pt had a BM. WBC- Normal.  9/10/18: Patient seen and examined at bedside in 2E. Patient stated that he had a BM yesterday, is passing flatus, and LLQ pain is significantly improved. He reported that he fell when getting up to use the bathroom last night, attributed it to taking Ambien for trouble sleeping. Patient stated that he feels fine now, having no pain, and does not want imaging. Pt had some clear liquid diet, D/W GI DR Tobin, D/W DR Oseguera- Keep NPO for Now, Possible clear liquid in AM. Restart Coumadin when pt tolerates PO diet.    OVERNIGHT EVENTS: Per chart, RN reported finding patient on the floor of his room on his left side, patient reported falling on his buttock/left thigh. Patient refused all imaging. Pt took Ambien last night. pt states he sat down on his Buttock. Denies any Head trauma or other injury.    Home Medications:  alfuzosin 10 mg oral tablet, extended release: 1 tab(s) orally once a day (08 Sep 2018 16:31)  atorvastatin 40 mg oral tablet: 1 tab(s) orally once a day (08 Sep 2018 16:31)  Centrum Silver oral tablet: 1 tab(s) orally once a day (08 Sep 2018 16:31)  digoxin 125 mcg (0.125 mg) oral tablet: 1 tab(s) orally once a day (08 Sep 2018 16:31)  Ecotrin Adult Low Strength 81 mg oral delayed release tablet: 1 tab(s) orally once a day (08 Sep 2018 16:31)  Metoprolol Succinate  mg oral tablet, extended release: 1 tab(s) orally once a day (08 Sep 2018 16:31)  warfarin 5 mg oral tablet: 1 tab(s) orally Monday through Saturday  (08 Sep 2018 16:31)  warfarin 7.5 mg oral tablet: orally every  (08 Sep 2018 16:31)    MEDICATIONS  (STANDING):  digoxin     Tablet 0.125 milliGRAM(s) Oral daily  enoxaparin Injectable 40 milliGRAM(s) SubCutaneous daily  influenza   Vaccine 0.5 milliLiter(s) IntraMuscular once  metoprolol tartrate 25 milliGRAM(s) Oral every 6 hours  pantoprazole  Injectable 40 milliGRAM(s) IV Push daily  piperacillin/tazobactam IVPB. 3.375 Gram(s) IV Intermittent every 8 hours  sodium chloride 0.9%. 1000 milliLiter(s) (100 mL/Hr) IV Continuous <Continuous>  tamsulosin 0.4 milliGRAM(s) Oral at bedtime    MEDICATIONS  (PRN):      Allergies    sulfa drugs (Rash)    Intolerances    lactose (Unknown)      REVIEW OF SYSTEMS:  CONSTITUTIONAL: No fever, No chills, No fatigue, No myalgia, No Body ache, No Weakness  EYES: No eye pain,  No visual disturbances, No discharge, No Redness  ENMT:  No ear pain, No nose bleed, No vertigo; No sinus or throat pain, No Congestion  NECK: No pain, No stiffness  RESPIRATORY: No cough, wheezing, No  hemoptysis, No shortness of breath  CARDIOVASCULAR: No chest pain, palpitations  GASTROINTESTINAL: Mild LLQ pain (improved), No epigastric pain. No nausea, No vomiting; No diarrhea or constipation. [ x ] BM  GENITOURINARY: No dysuria, No frequency, No urgency, No hematuria, or incontinence  NEUROLOGICAL: No headaches, No dizziness, No numbness, No tingling, No tremors, No weakness  EXT: No Swelling, No Pain, No Edema  SKIN:  [ x ] No itching, burning, rashes, or lesions   MUSCULOSKELETAL: No joint pain or swelling; No muscle pain, No back pain, No extremity pain  PSYCHIATRIC: No depression, anxiety, mood swings or difficulty sleeping at night  PAIN SCALE: [  ] None  [ x ] Other-Soreness LLQ 1/10  ROS Unable to obtain due to - [  ] Dementia  [  ] Lethargy  [  ] Sedated [  ] non verbal  REST OF REVIEW Of SYSTEM - [ x ] Normal     Vital Signs Last 24 Hrs  T(C): 36.8 (10 Sep 2018 07:38), Max: 37.7 (09 Sep 2018 23:29)  T(F): 98.3 (10 Sep 2018 07:38), Max: 99.8 (09 Sep 2018 23:29)  HR: 80 (10 Sep 2018 07:38) (76 - 99)  BP: 132/84 (10 Sep 2018 07:38) (101/55 - 163/77)  BP(mean): 100 (09 Sep 2018 11:22) (76 - 100)  RR: 17 (10 Sep 2018 07:38) (15 - 17)  SpO2: 100% (10 Sep 2018 07:38) (97% - 100%)      I&O's Summary    09 Sep 2018 07:01  -  10 Sep 2018 07:00  --------------------------------------------------------  IN: 1100 mL / OUT: 1250 mL / NET: -150 mL      PHYSICAL EXAM:  GENERAL:  [ x ] NAD , [ x ] well appearing, [  ] Agitated, [  ] Drowsy,  [  ] Lethargy, [  ] confused   HEAD:  [ x ] Normal, [  ] Other  EYES:  [ x ] EOMI, [ x ] PERRLA, [ x ] conjunctiva and sclera clear normal, [  ] Other,  [  ] Pallor, [  ] Discharge  ENMT:  [ x ] Normal, [ x ] Moist mucous membranes, [ x ] Good dentition, [x  ] No Thrush  NECK:  [ x ] Supple, [ x ] No JVD, [ x ] Normal thyroid, [  ] Lymphadenopathy [  ] Other  CHEST/LUNG:  [ x ] Clear to auscultation bilaterally, [ x ] Breath Sounds equal,  [ x ] No rales, [ x ] No rhonchi  [ x ]  No wheezing  HEART:  [x  ] Regular rate and rhythm, [  ] tachycardia, [  ] Bradycardia,  [ x ] irregular  [ x ] No murmurs, No rubs, No gallops, [  ] PPM in place (Mfr:  )  ABDOMEN:  [ x ] Soft, [ x ] Sore/mildly tender to palpation at LLQ rated 1/10, [ x ] Nondistended, [ x ] No mass, [ x ] Bowel sounds present, [  ] obese, No R/R/G  NERVOUS SYSTEM:  [ x ] Alert & Oriented X3, [ x ] Nonfocal  [  ] Confusion  [  ] Encephalopathic [  ] Sedated [  ] Unable to assess, [  ] Other-  EXTREMITIES: [ x ] 2+ Peripheral Pulses, No clubbing, No cyanosis,  [  ] edema B/L lower EXT. [  ] PVD stasis skin changes B/L Lower EXT  LYMPH: No lymphadenopathy noted  SKIN:  [ x ] No rashes or lesions, [  ] Pressure Ulcers, [ x ] R buttock hematoma, [  ] Skin Tears, [ x ] Other- L lateral thigh Bump    DIET: Clear liquid---> NPO    LABS:                        11.8   7.15  )-----------( 105      ( 10 Sep 2018 06:15 )             34.8     10 Sep 2018 06:15    144    |  108    |  12     ----------------------------<  99     3.6     |  28     |  0.93     Ca    8.7        10 Sep 2018 06:15  Phos  1.6       10 Sep 2018 06:15  Mg     2.2       10 Sep 2018 06:15    TPro  6.7    /  Alb  3.1    /  TBili  1.8    /  DBili  x      /  AST  26     /  ALT  25     /  AlkPhos  63     10 Sep 2018 06:15    LIVER FUNCTIONS - ( 10 Sep 2018 06:15 )  Alb: 3.1 g/dL / Pro: 6.7 g/dL / ALK PHOS: 63 U/L / ALT: 25 U/L / AST: 26 U/L / GGT: x           PT/INR - ( 10 Sep 2018 06:15 )   PT: 13.2 sec;   INR: 1.21 ratio    PTT - ( 08 Sep 2018 11:55 )  PTT:34.7 sec    Urinalysis Basic - ( 08 Sep 2018 13:25 )  Color: Yellow / Appearance: Clear / S.005 / pH: x  Gluc: x / Ketone: Negative  / Bili: Negative / Urobili: Negative   Blood: x / Protein: Negative / Nitrite: Negative   Leuk Esterase: Negative / RBC: x / WBC x   Sq Epi: x / Non Sq Epi: x / Bacteria: x      Culture - Blood (collected 08 Sep 2018 18:53)  Source: .Blood Blood  Preliminary Report (09 Sep 2018 19:01):    No growth to date.    Culture - Blood (collected 08 Sep 2018 18:53)  Source: .Blood Blood  Preliminary Report (09 Sep 2018 19:01):    No growth to date.    Culture - Urine (collected 08 Sep 2018 16:49)  Source: .Urine Clean Catch (Midstream)  Final Report (09 Sep 2018 22:22):    <10,000 CFU/ml    Normal Urogenital bryce present      RADIOLOGY & ADDITIONAL TESTS: NONE      HEALTH ISSUES - PROBLEM Dx:  Need for prophylactic measure: Need for prophylactic measure  History of myocardial infarction: History of myocardial infarction  Atrial fibrillation: Atrial fibrillation  Diverticulitis of large intestine with perforation, unspecified bleeding status: Diverticulitis of large intestine with perforation, unspecified bleeding status        Consultant(s) Notes Reviewed:  [ x ] YES     Care Discussed with [ x ] Consultants  [ x ] Patient  [  ] Family  [  ]   [  ] Social Service  [ x ] RN, [  ] Physical Therapy  DVT PPX: [ x ] Lovenox, [  ] S C Heparin, [  ] Coumadin, [  ] Xarelto, [  ] Eliquis, [  ] Pradaxa, [  ] IV Heparin drip, [ x ] SCD [  ] Contraindication 2 to GI Bleed, [  ] Ambulation  Advanced directive: [ x ] None, [  ] DNR/DNI

## 2018-09-10 NOTE — PROGRESS NOTE ADULT - PROBLEM SELECTOR PLAN 2
Atrial fibrillation, HR stable  - Continue IV Metoprolol 25mg q6h.  - Holding Digoxin, level 0.8 on 9/9.  - Holding AC and s/p KCentra 9/9 for possible surgery. AC as per Surgery.  - Cardiology Dr Peralta. Atrial fibrillation, HR stable  - Changed from IV to PO Metoprolol 25mg q6h.  - Restarted PO Digoxin 0.125mg daily, level 0.8 on 9/9.  - Holding AC and s/p KCentra 9/9 for possible surgery. AC as per Surgery.  - Cardiology Dr Peralta. Atrial fibrillation, HR stable  - Changed from IV to PO Metoprolol 25mg q6h.  - Restarted PO Digoxin 0.125mg daily, level 0.8 on 9/9.  - Holding AC and s/p KCentra 9/9 for possible surgery. AC as per Surgery.  - Cardiology Dr Peralta.  -Restart Coumadin when pt tolerates PO diet.

## 2018-09-10 NOTE — DISCHARGE NOTE ADULT - CARE PROVIDERS DIRECT ADDRESSES
,DirectAddress_Unknown,[email or web address] ,DirectAddress_Unknown,[email or web address],DirectAddress_Unknown

## 2018-09-10 NOTE — PROGRESS NOTE ADULT - PROBLEM SELECTOR PLAN 3
Hx of MI in 2012 x1 stent  - Holding Ecotrin 81mg and Statin.  - Continue IV Metoprolol 25mg q6h.  - Cardiology Dr Peralta. Hx of MI in 2012 x1 stent  - Holding Ecotrin 81mg and Statin.  - Continue PO Metoprolol 25mg q6h.  - Cardiology Dr Peralta.

## 2018-09-10 NOTE — PROGRESS NOTE ADULT - ASSESSMENT
68 year old M with PMHx of chronic af on dig/metoprolol, anticoagulated with warfarin.  He had an lad-territory MI in 2012, s/p pci.  No ischemic testing since then he believes.  He reports a normal ef, and he denies any sxs of angina, heart failure or hemodynamically compromising arrhythmia.  He has an extensive history of diverticulitis (5 episodes in the past) who presented with LLQ abdominal pain starting at 4AM today. He describes the pain as sharp and constant. Additionally he was nauseous and "retching this morning". He states he was performing his routine ADL's yesterday. He did not take any medications for the pain. He was unable to take his medications today. He last ate yesterday evening. He reports feeling constipated for the last couple of days but had BM yesterday morning with bleeding  He denies fevers, chills, chest pain, palpitations, sob. Does not remember when his last colonoscopy was performed.     In the ED, the patient's vital signs were T: 98.8, , /88, R: 16, SpO2 98% on RA. EKG: Atrial fibrillation @110bpm. CXR: No evidence of active chest disease. CT abdomen with oral contrast: Perforated diverticulitis with moderate pneumoperitoneum. No intraperitoneal abscess. WBC of 10.76. CMP significant for bili of 1.4. Dr. Oseguera evaluated patient in ED, recommended ICU admission and non-surgical management at this time. (08 Sep 2018 15:05)    Given the possibility of urgent surgery his inr was reversed.       -there is no evidence of acute ischemia.  -h/o cad  -cont asa  -cont bb  -cont statin    -there is no evidence of significant arrhythmia.  -chronic af on ac  - Now that abd process is resolving, rates are returning to normal  - Cont lopressor and dig at current PO doses.   -eventually to resume warfarin, goal inr 2-3    -there is no evidence for meaningful  volume overload.  -presumed to have a normal ef from his description    - if necessary from a cv perspective he would be considered optimized from a cv perspective for what may be urgent surgery.  Routine hemodynamic monitoring is recommended.  -thus far need for surgery is not felt to be immediate    -monitor electrolytes, keep k>4, Mg>2   -will follow

## 2018-09-10 NOTE — CONSULT NOTE ADULT - PROBLEM SELECTOR PROBLEM 1
Diverticulitis of large intestine with perforation, unspecified bleeding status
Diverticulitis of large intestine with perforation, unspecified bleeding status

## 2018-09-10 NOTE — PROGRESS NOTE ADULT - SUBJECTIVE AND OBJECTIVE BOX
T max 99.8, 98.3 this AM  VS wnl  No pain.  Hungry  Abd mild tenderness focally over diverticulitis, rest of abd nontender nondistended  WBC 7.2  P: continue NPO at least 1 more day

## 2018-09-10 NOTE — PROGRESS NOTE ADULT - PROBLEM SELECTOR PLAN 4
- Thrombocytopenia 2/2 acute perforated diverticulitis.  - On IV Abx.  - Continue to monitor CBC. 2/2 acute perforated diverticulitis.  - On IV Abx.  - Continue to monitor CBC.

## 2018-09-10 NOTE — DISCHARGE NOTE ADULT - HOSPITAL COURSE
68 year old M with PMHx of A.fib on Coumadin and digoxin, MI x1 stent (July 2012, LAD) and extensive history of diverticulitis (5 episodes in the past) who presented with LLQ abdominal pain starting at 4AM on 9/8/18. He describes the pain as sharp and constant. Additionally he admitted to nausea and vomiting. Stated he was performing his routine ADL's the day before. He did not take any medications for the pain. He was unable to take his medications on day of admission. He reported feeling constipated for the last couple of days but had bloody BM the day prior to admission.  He denied fevers, chills, chest pain, palpitations, sob. Unsure when last colonoscopy was performed. In the ED, the patient's vital signs were T: 98.8, , /88, R: 16, SpO2 98% on RA. EKG: Atrial fibrillation @110bpm. CXR: No evidence of active chest disease. CT abdomen with oral contrast: Perforated diverticulitis with moderate pneumoperitoneum. No intraperitoneal abscess. WBC of 10.76. CMP significant for bili of 1.4. Dr. Oseguera evaluated patient in ED, recommended ICU admission and non-surgical management at this time. Given the possibility of urgent surgery Pt was given K centra in ER, Pt Got IV Fluid Bolus & IV Zosyn x 1 in ER.    Patient was admitted to ICU, kept NPO with IV fluids and IV zosyn. Surgeon (Dr. Oseguera) recommended non-surgical intervention at this time, patient will eventually require non-urgent surgery. Perforation clinically sealed off. Holding all pain medications at this time due to need to evaluate new onset pain. Gastroenterology (Dr. Tobin) evaluated patient, recommended IVF at this time. Patient also evaluated by Cardio (Dr. Peralta), coumadin held for possible OR, patient on lovenox for dvt ppx. Patient continued on digoxin, metoprolol for his afib. Throughout hospital stay, patient's leukocytosis resolved, labs wnl, abdominal exam showed improving tenderness to palpation. 68 year old M with PMHx of A fib on Coumadin and Digoxin, MI x1 stent (July 2012, LAD) and extensive history of diverticulitis presented with LLQ abdominal pain starting at 4AM on 9/8/18. He described the pain as sharp and constant, and admitted to nausea and vomiting. Stated he was performing his routine ADLs the day before. He did not take any medications for the pain. He was unable to take his medications on day of admission. He reported feeling constipated for the last couple of days but had bloody BM the day prior to admission. He denied fevers, chills, chest pain, palpitations, SOB. Unsure when last colonoscopy was performed. In the ED, the patient's vital signs were T: 98.8, HR: 105, BP: 139/88, R: 16, SpO2 98% on RA. EKG: Atrial fibrillation @ 110bpm. CXR: No evidence of active chest disease. CT abdomen with oral contrast: Perforated diverticulitis with moderate pneumoperitoneum. No intraperitoneal abscess. WBC of 10.76. CMP significant for bili of 1.4. Dr. Oseguera evaluated patient in ED, recommended ICU admission and non-surgical management at this time. In the ED, patient received IV Fluid Bolus & IV Zosyn as well as K centra given the possibility of urgent surgery.    Patient was admitted to ICU, kept NPO with IV fluids and IV zosyn. Surgeon (Dr. Oseguera) recommended non-surgical intervention at this time, patient will eventually require non-urgent surgery. Perforation clinically sealed off. Pain medications were held due to the need to evaluate new onset pain. Gastroenterology (Dr. Tobin) evaluated patient, recommended IVF. Patient also evaluated by Cardio (Dr. Peralta), Coumadin held for possible OR, patient on Lovenox for DVT prophylaxis. Patient continued on Digoxin, Metoprolol for his A fib. After discussion with cardio and surgery, Coumadin was restarted. Throughout hospital stay, patient's leukocytosis resolved, labs were within normal limits and abdominal exam showed improving tenderness to palpation.     Patient responded well to medical treatment during hospitalization. Patient was seen and examined on the day of discharge and is medically stable for discharge, with close outpatient follow up. 68 year old M with PMHx of A fib on Coumadin and Digoxin, MI x1 stent (July 2012, LAD) and extensive history of diverticulitis presented with LLQ abdominal pain starting at 4AM on 9/8/18. He described the pain as sharp and constant, and admitted to nausea and vomiting. Stated he was performing his routine ADLs the day before. He did not take any medications for the pain. He was unable to take his medications on day of admission. He reported feeling constipated for the last couple of days but had bloody BM the day prior to admission. He denied fevers, chills, chest pain, palpitations, SOB. Unsure when last colonoscopy was performed. In the ED, the patient's vital signs were T: 98.8, HR: 105, BP: 139/88, R: 16, SpO2 98% on RA. EKG: Atrial fibrillation @ 110bpm. CXR: No evidence of active chest disease. CT abdomen with oral contrast: Perforated diverticulitis with moderate pneumoperitoneum. No intraperitoneal abscess. WBC of 10.76. CMP significant for bili of 1.4. Dr. Oseguera evaluated patient in ED, recommended ICU admission and non-surgical management at this time. In the ED, patient received IV Fluid Bolus & IV Zosyn as well as K centra given the possibility of urgent surgery.    Patient was admitted to ICU, kept NPO with IV fluids and IV zosyn. Surgeon (Dr. Oseguera) recommended non-surgical intervention at this time, NPO, IV Abx, IV Fluids, patient will eventually require non-urgent surgery. Perforation clinically sealed off. Pain medications were held due to the need to evaluate new onset pain. ID Dr Ricardo saw pt, IV Abx with Zosyn , NPO, IV Fluids, Patient also evaluated by Cardio (Dr. Peralta), Coumadin held for possible OR, patient on Lovenox for DVT prophylaxis. GI (Dr. Tobin) evaluated patient, recommended IV Abx .  Patient continued on  IV Digoxin, Metoprolol for his A fib. DR Oseguera & Dr Santana followed pt, Pt was started on Clears & full liquid diet, WBC was Normal, No Fever, pt tolerated diet well,  After discussion with cardio and surgery, Coumadin was restarted. Throughout hospital stay, patient's leukocytosis resolved, labs were within normal limits and abdominal exam showed improving tenderness to palpation, NO Pain.    Patient responded well to medical treatment during hospitalization. Patient was seen and examined on the day of discharge and is medically stable for discharge, with close outpatient follow up with surgery/GI for out pt colonoscopy in 6-8 weeks .followed by planned surgery for colon resection. Pt is on Low residue diet stable for D/C, ID DR contreras recommended PO Augmentin 875 mg 2x day x 7 days with colace & Bacid. Pt restarted Coumadin, advised to go for PT/INR with cardiology in 3-4 days. Follow with PMD in 1 week. Stable for D/C home as per Cardiology, GI, Surgery & ID stand point.

## 2018-09-10 NOTE — PROGRESS NOTE ADULT - PROBLEM SELECTOR PLAN 5
- Patient on Coumadin at home.   - Holding AC and s/p KCentra 9/9 for possible surgery.  - Resume AC as per Surgery.    IMPROVE VTE Individual Risk Assessment        RISK                                                          Points  [  ] Previous VTE                                                3  [  ] Thrombophilia                                             2  [  ] Lower limb paralysis                                   2        (unable to hold up >15 seconds)    [  ] Current Cancer                                             2         (within 6 months)  [  ] Immobilization > 24 hrs                              1  [  ] ICU/CCU stay > 24 hours                             1  [ x ] Age > 60                                                         1    IMPROVE VTE Score: 1 - Patient on Coumadin at home.   - Holding AC and s/p KCentra 9/9 for possible surgery.  - Resume AC as per Surgery.  - Melatonin 2mg PO PRN at bedtime for insomnia.    IMPROVE VTE Individual Risk Assessment        RISK                                                          Points  [  ] Previous VTE                                                3  [  ] Thrombophilia                                             2  [  ] Lower limb paralysis                                   2        (unable to hold up >15 seconds)    [  ] Current Cancer                                             2         (within 6 months)  [  ] Immobilization > 24 hrs                              1  [  ] ICU/CCU stay > 24 hours                             1  [ x ] Age > 60                                                         1    IMPROVE VTE Score: 1

## 2018-09-10 NOTE — PROGRESS NOTE ADULT - SUBJECTIVE AND OBJECTIVE BOX
VAISHNAVI REED  MRN-[id number] 68y    GENERAL SURGERY/ DR. ORTA    NO N/V, FEVER, CHILLS, URINARY SYMPTOMS, ABDOMINAL PAIN   + FLATUS AND BM     MEDICATIONS  (STANDING):  digoxin     Tablet 0.125 milliGRAM(s) Oral daily  enoxaparin Injectable 40 milliGRAM(s) SubCutaneous daily  influenza   Vaccine 0.5 milliLiter(s) IntraMuscular once  metoprolol tartrate 25 milliGRAM(s) Oral every 6 hours  pantoprazole  Injectable 40 milliGRAM(s) IV Push daily  piperacillin/tazobactam IVPB. 3.375 Gram(s) IV Intermittent every 8 hours  sodium chloride 0.9%. 1000 milliLiter(s) (100 mL/Hr) IV Continuous <Continuous>  tamsulosin 0.4 milliGRAM(s) Oral at bedtime    Vital Signs Last 24 Hrs  T(C): 37.7 (09 Sep 2018 23:29), Max: 37.7 (09 Sep 2018 23:29)  T(F): 99.8 (09 Sep 2018 23:29), Max: 99.8 (09 Sep 2018 23:29)  HR: 91 (09 Sep 2018 23:29) (76 - 99)  BP: 159/75 (09 Sep 2018 23:29) (101/55 - 163/77)  BP(mean): 100 (09 Sep 2018 11:22) (76 - 100)  RR: 16 (09 Sep 2018 23:29) (15 - 28)  SpO2: 97% (09 Sep 2018 23:29) (96% - 100%)      ABDOMEN: + BS, SOFT, NON DISTENDED,  MILD LLQ TENDERNESS ON DEEP PALPATION. NO REBOUND, RIGIDITY OR GUARDING.                                     11.8   7.15  )-----------( 105      ( 10 Sep 2018 06:15 )             34.8      09-09    144  |  108  |  16  ----------------------------<  100<H>  3.5   |  28  |  0.88    Ca    8.3<L>      09 Sep 2018 06:56    TPro  6.4  /  Alb  3.1<L>  /  TBili  2.3<H>  /  DBili  x   /  AST  23  /  ALT  25  /  AlkPhos  67  09-09    PT/INR - ( 10 Sep 2018 06:15 )   PT: 13.2 sec;   INR: 1.21 ratio    PTT - ( 08 Sep 2018 11:55 )  PTT:34.7 sec    CT Abdomen and Pelvis w/ Oral Cont and w/ IV Cont (09.08.18 @ 14:17)     LOWER CHEST: Coronary artery calcifications.    LIVER: A few scattered subcentimeter hypodense lesions which are too   small to characterize  SPLEEN: Within normal limits.  PANCREAS: Within normal limits.  GALLBLADDER: Within normal limits.  BILE DUCTS: Normal caliber.  ADRENALS: Within normal limits.  KIDNEYS/URETERS: Several right renal cysts and multiple low-density   lesions in both kidneys which are too small to characterize. Punctate   non obstructing right renal calculi. No hydronephrosis.    RETROPERITONEUM: No lymphadenopathy.    VESSELS:  Atherosclerotic arterial calcifications.  Normal caliber aorta.    BOWEL/PERITONEUM: Severe left-sided colonic diverticulosis and moderate   inflammatory changes adjacent to the proximal descending colon with small   pockets of extraluminal gas. Moderate amount of pneumoperitoneum. No   intraperitoneal abscess or ascites.  Appendix has been resected.    REPRODUCTIVE ORGANS: Markedly enlarged prostate.  BLADDER: Within normal limits.    ABDOMINAL WALL: Within normal limits.  BONES: No acute bony abnormality.    IMPRESSION: Perforated diverticulitis with moderate pneumoperitoneum. No   intraperitoneal abscess.    VAISHNAVI SANDOVAL M.D., ATTENDING RADIOLOGIST                          ASSESSMENT &  PLAN: PERFORATED DIVERTICULITIS                                    MULTIPLE EPISODES IN THE PAST     CLINICALLY STABLE  CONTINUE ZOSYN  OBSERVE, SERIAL ABDOMINAL  EXAMS  F/U AM LABS  IF REMAINS STABLE , WILL START ON CLEARS AFTER REVIEWING AM LABS

## 2018-09-10 NOTE — DISCHARGE NOTE ADULT - INSTRUCTIONS
Please limit salt and cholesterol intake. Please eat a low fiber/low residue diet until you follow up with surgeon/GI. Limit salt and cholesterol intake. Please eat a low fiber/low residue diet , Well cooked diet, until you follow up with surgeon/GI. Limit salt and cholesterol intake.

## 2018-09-10 NOTE — DISCHARGE NOTE ADULT - PLAN OF CARE
Resolution - CT abdomen with oral contrast showed perforated diverticulitis with moderate pneumoperitoneum; no intraperitoneal abscess. You were treated with IV Zosyn.  - Please follow up as outpatient with surgeon for further management. - Please continue Digoxin and Coumadin.  - Please follow up as outpatient with cardiology and PMD for further management. - Please continue Atorvastatin, Ecotrin, Metoprolol.  - Please follow up as outpatient with cardiology and PMD for further management. - You had low platelet count, likely secondary to acute perforated diverticulitis. Platelet count improved through your hospital stay.  - Please follow up as outpatient with PMD for further management. - CT abdomen with oral contrast showed perforated diverticulitis with moderate pneumoperitoneum; no intraperitoneal abscess. You were treated with IV Zosyn.  - Take antibiotic (Augmentin 875mg twice a day for 7 days) and Colace (stool softener).  - Take Bacid probiotic (3 weeks).  - Follow up as outpatient with GI or surgery for outpatient colonoscopy in 8 weeks.  - Follow up with surgeon in 2 weeks for further scheduling surgical intervention.  - Return early to hospital if you experience abdominal pain or fever. - Please continue Digoxin and home dosage of Coumadin. Please follow up with cardiology to check PT/INR on Monday. Keep INR between 2-3.  - Follow up as outpatient with cardiology and PMD for further management. - Please continue Atorvastatin, Ecotrin, Toprol XL.  - Follow up as outpatient with cardiology and PMD for further management. - You had low platelet count, likely secondary to acute perforated diverticulitis. Platelet count improved through your hospital stay.  - Follow up as outpatient with PMD for repeat CBC in 1 week. - CT abdomen with oral contrast showed perforated diverticulitis with moderate pneumoperitoneum; no intraperitoneal abscess. You were treated with IV Zosyn.  - Take antibiotic (Augmentin 875mg twice a day for 7 days) and Colace (stool softener) to avoid Constipation.  - Take Bacid probiotic (3 weeks).  - Follow up as outpatient with GI or surgery for outpatient colonoscopy in 8 weeks.  - Follow up with surgeon in 2 weeks for scheduling surgical intervention.  - Return early to hospital if you experience abdominal pain or fever. -  continue Digoxin, Toprol XL and home dosage of Coumadin.  - follow up with cardiology to check PT/INR on Monday. Keep INR between 2-3.  - Follow up as outpatient with cardiology and PMD for further management. -  continue Atorvastatin, Ecotrin, Toprol XL.  - Follow up as outpatient with cardiology and PMD for further management. - You had low platelet count, likely secondary to acute perforated diverticulitis.   Platelet count improved through your hospital stay.  - Follow up as outpatient with PMD for repeat CBC in 1 week. On Alfuzosin daily

## 2018-09-10 NOTE — CHART NOTE - NSCHARTNOTEFT_GEN_A_CORE
Called by RN for patient fall. Patient seen and examined at bedside. Patient reports getting up to pee and on return from the restroom patient's legs went out from under him and he fell on his buttock onto the iv pole base. Patient immediately stood up and then fell again due to legs giving out onto his left thigh. Denies LOC, CP, SOB, palpitations, dizziness, blurred vision, n/v/abpain, weakness, lethargy, confusion, speech difficulties. Event was not witnessed by nursing. Nursing reported finding patient on the floor of his room on his left side.      T(C): 37.7 (09-09-18 @ 23:29), Max: 37.7 (09-09-18 @ 23:29)  HR: 91 (09-09-18 @ 23:29) (76 - 99)  BP: 159/75 (09-09-18 @ 23:29) (101/55 - 163/77)  RR: 16 (09-09-18 @ 23:29) (14 - 28)  SpO2: 97% (09-09-18 @ 23:29) (94% - 100%)  Wt(kg): --    Physical :  Gen- NAD, ncat  MSK: Left thigh nodule 1.5 cm in diameter, Right buttock hematoma approximately 5x5 cm  Ext- no edema, 2+ pulses b/l  Neuro: no focal deficits  Pysch: appropriate, AAOx3    LABS:                        12.1   9.47  )-----------( 103      ( 09 Sep 2018 06:56 )             36.3     09-09    144  |  108  |  16  ----------------------------<  100<H>  3.5   |  28  |  0.88    Ca    8.3<L>      09 Sep 2018 06:56    TPro  6.4  /  Alb  3.1<L>  /  TBili  2.3<H>  /  DBili  x   /  AST  23  /  ALT  25  /  AlkPhos  67  09-09  PT/INR - ( 09 Sep 2018 06:56 )   PT: 16.5 sec;   INR: 1.50 ratio    PTT - ( 08 Sep 2018 11:55 )  PTT:34.7 sec          Assessment/Plan  68 year old M with PMHx of A.fib on Coumadin and digoxin, MI x1 stent (July 2012, LAD) and extensive history of diverticulitis (5 episodes in the past) who presented with LLQ abdominal pain   1. Offered pelvic xray and LLE xray. Patient refused all imaging at this time.  2. Will continue to monitor. RN to call if patient status changes.

## 2018-09-10 NOTE — DISCHARGE NOTE ADULT - CARE PROVIDER_API CALL
Angelito Marsh), Medicine  [address] [place] [address]  Phone: [phone]  Fax: [phone]    Carlos Santana), Surgery  [address] [place] [address]  [address] [place] [address]  Phone: [phone]  Fax: [phone] Angelito Marsh), Medicine  [address] [place] [address]  Phone: [phone]  Fax: [phone]    Carlos Santana), Surgery  [address] [place] [address]  [address] [place] [address]  Phone: [phone]  Fax: [phone]    Joseph oTbin (DO), Gastroenterology; Internal Medicine  [address] [place] [address]  Phone: [phone]  Fax: [phone]

## 2018-09-10 NOTE — DISCHARGE NOTE ADULT - PROVIDER TOKENS
TOKEN:'[id number]',TOKEN:'[id number]' TOKEN:'[id number]',TOKEN:'[id number]',TOKEN:'[id number]'

## 2018-09-10 NOTE — DISCHARGE NOTE ADULT - ADDITIONAL INSTRUCTIONS
Please follow up as outpatient for further management. Follow up as outpatient for further management.

## 2018-09-11 DIAGNOSIS — E80.6 OTHER DISORDERS OF BILIRUBIN METABOLISM: ICD-10-CM

## 2018-09-11 LAB
ALBUMIN SERPL ELPH-MCNC: 3 G/DL — LOW (ref 3.3–5)
ALP SERPL-CCNC: 61 U/L — SIGNIFICANT CHANGE UP (ref 40–120)
ALT FLD-CCNC: 24 U/L — SIGNIFICANT CHANGE UP (ref 12–78)
ANION GAP SERPL CALC-SCNC: 9 MMOL/L — SIGNIFICANT CHANGE UP (ref 5–17)
AST SERPL-CCNC: 32 U/L — SIGNIFICANT CHANGE UP (ref 15–37)
BASOPHILS # BLD AUTO: 0.01 K/UL — SIGNIFICANT CHANGE UP (ref 0–0.2)
BASOPHILS NFR BLD AUTO: 0.2 % — SIGNIFICANT CHANGE UP (ref 0–2)
BILIRUB SERPL-MCNC: 1.9 MG/DL — HIGH (ref 0.2–1.2)
BUN SERPL-MCNC: 9 MG/DL — SIGNIFICANT CHANGE UP (ref 7–23)
CALCIUM SERPL-MCNC: 8.4 MG/DL — LOW (ref 8.5–10.1)
CHLORIDE SERPL-SCNC: 108 MMOL/L — SIGNIFICANT CHANGE UP (ref 96–108)
CO2 SERPL-SCNC: 27 MMOL/L — SIGNIFICANT CHANGE UP (ref 22–31)
CREAT SERPL-MCNC: 0.87 MG/DL — SIGNIFICANT CHANGE UP (ref 0.5–1.3)
EOSINOPHIL # BLD AUTO: 0.07 K/UL — SIGNIFICANT CHANGE UP (ref 0–0.5)
EOSINOPHIL NFR BLD AUTO: 1.4 % — SIGNIFICANT CHANGE UP (ref 0–6)
GLUCOSE SERPL-MCNC: 76 MG/DL — SIGNIFICANT CHANGE UP (ref 70–99)
HCT VFR BLD CALC: 33.8 % — LOW (ref 39–50)
HGB BLD-MCNC: 11.8 G/DL — LOW (ref 13–17)
IMM GRANULOCYTES NFR BLD AUTO: 0.4 % — SIGNIFICANT CHANGE UP (ref 0–1.5)
INR BLD: 1.22 RATIO — HIGH (ref 0.88–1.16)
LYMPHOCYTES # BLD AUTO: 0.57 K/UL — LOW (ref 1–3.3)
LYMPHOCYTES # BLD AUTO: 11.5 % — LOW (ref 13–44)
MCHC RBC-ENTMCNC: 30.3 PG — SIGNIFICANT CHANGE UP (ref 27–34)
MCHC RBC-ENTMCNC: 34.9 GM/DL — SIGNIFICANT CHANGE UP (ref 32–36)
MCV RBC AUTO: 86.9 FL — SIGNIFICANT CHANGE UP (ref 80–100)
MONOCYTES # BLD AUTO: 0.33 K/UL — SIGNIFICANT CHANGE UP (ref 0–0.9)
MONOCYTES NFR BLD AUTO: 6.7 % — SIGNIFICANT CHANGE UP (ref 2–14)
NEUTROPHILS # BLD AUTO: 3.95 K/UL — SIGNIFICANT CHANGE UP (ref 1.8–7.4)
NEUTROPHILS NFR BLD AUTO: 79.8 % — HIGH (ref 43–77)
PLATELET # BLD AUTO: 107 K/UL — LOW (ref 150–400)
POTASSIUM SERPL-MCNC: 3.3 MMOL/L — LOW (ref 3.5–5.3)
POTASSIUM SERPL-SCNC: 3.3 MMOL/L — LOW (ref 3.5–5.3)
PROT SERPL-MCNC: 6.8 G/DL — SIGNIFICANT CHANGE UP (ref 6–8.3)
PROTHROM AB SERPL-ACNC: 13.4 SEC — HIGH (ref 9.8–12.7)
RBC # BLD: 3.89 M/UL — LOW (ref 4.2–5.8)
RBC # FLD: 13.3 % — SIGNIFICANT CHANGE UP (ref 10.3–14.5)
SODIUM SERPL-SCNC: 144 MMOL/L — SIGNIFICANT CHANGE UP (ref 135–145)
WBC # BLD: 4.95 K/UL — SIGNIFICANT CHANGE UP (ref 3.8–10.5)
WBC # FLD AUTO: 4.95 K/UL — SIGNIFICANT CHANGE UP (ref 3.8–10.5)

## 2018-09-11 PROCEDURE — 99233 SBSQ HOSP IP/OBS HIGH 50: CPT

## 2018-09-11 PROCEDURE — 99232 SBSQ HOSP IP/OBS MODERATE 35: CPT

## 2018-09-11 RX ORDER — POTASSIUM CHLORIDE 20 MEQ
20 PACKET (EA) ORAL
Qty: 0 | Refills: 0 | Status: COMPLETED | OUTPATIENT
Start: 2018-09-11 | End: 2018-09-11

## 2018-09-11 RX ADMIN — Medication 25 MILLIGRAM(S): at 12:22

## 2018-09-11 RX ADMIN — Medication 25 MILLIGRAM(S): at 05:59

## 2018-09-11 RX ADMIN — Medication 20 MILLIEQUIVALENT(S): at 12:22

## 2018-09-11 RX ADMIN — Medication 0.12 MILLIGRAM(S): at 05:59

## 2018-09-11 RX ADMIN — PIPERACILLIN AND TAZOBACTAM 25 GRAM(S): 4; .5 INJECTION, POWDER, LYOPHILIZED, FOR SOLUTION INTRAVENOUS at 14:53

## 2018-09-11 RX ADMIN — TAMSULOSIN HYDROCHLORIDE 0.4 MILLIGRAM(S): 0.4 CAPSULE ORAL at 21:39

## 2018-09-11 RX ADMIN — Medication 1 TABLET(S): at 12:22

## 2018-09-11 RX ADMIN — PIPERACILLIN AND TAZOBACTAM 25 GRAM(S): 4; .5 INJECTION, POWDER, LYOPHILIZED, FOR SOLUTION INTRAVENOUS at 21:39

## 2018-09-11 RX ADMIN — Medication 1 TABLET(S): at 08:25

## 2018-09-11 RX ADMIN — PANTOPRAZOLE SODIUM 40 MILLIGRAM(S): 20 TABLET, DELAYED RELEASE ORAL at 05:59

## 2018-09-11 RX ADMIN — Medication 25 MILLIGRAM(S): at 17:44

## 2018-09-11 RX ADMIN — Medication 25 MILLIGRAM(S): at 23:05

## 2018-09-11 RX ADMIN — Medication 1 TABLET(S): at 17:44

## 2018-09-11 RX ADMIN — PIPERACILLIN AND TAZOBACTAM 25 GRAM(S): 4; .5 INJECTION, POWDER, LYOPHILIZED, FOR SOLUTION INTRAVENOUS at 05:59

## 2018-09-11 RX ADMIN — Medication 1 TABLET(S): at 21:39

## 2018-09-11 RX ADMIN — ENOXAPARIN SODIUM 40 MILLIGRAM(S): 100 INJECTION SUBCUTANEOUS at 12:21

## 2018-09-11 RX ADMIN — Medication 20 MILLIEQUIVALENT(S): at 14:53

## 2018-09-11 NOTE — PROGRESS NOTE ADULT - PROBLEM SELECTOR PLAN 5
- Patient on Coumadin at home.   - Resume AC as per Surgery.  - Melatonin 2mg PO PRN at bedtime for insomnia.  - Bacid TID.    IMPROVE VTE Individual Risk Assessment        RISK                                                          Points  [  ] Previous VTE                                                3  [  ] Thrombophilia                                             2  [  ] Lower limb paralysis                                   2        (unable to hold up >15 seconds)    [  ] Current Cancer                                             2         (within 6 months)  [  ] Immobilization > 24 hrs                              1  [  ] ICU/CCU stay > 24 hours                             1  [ x ] Age > 60                                                         1    IMPROVE VTE Score: 1

## 2018-09-11 NOTE — PROGRESS NOTE ADULT - PROBLEM SELECTOR PLAN 3
Hx of MI in 2012 x1 stent  - Holding Ecotrin 81mg and Statin.  - Continue PO Metoprolol 25mg q6h.  - Cardiology Dr Peralta.

## 2018-09-11 NOTE — PROGRESS NOTE ADULT - SUBJECTIVE AND OBJECTIVE BOX
VAISHNAVI REED  MRN-[id number] 68y    GENERAL SURGERY/ DR. ORTA    NO FEVER, CHILLS, N/V  VOIDING, + FLATUS AND STATES ABDOMINAL PAIN HAS RESOLVED COMPLETELY     Vital Signs Last 24 Hrs  T(C): 36.9 (11 Sep 2018 07:49), Max: 37.3 (10 Sep 2018 23:41)  T(F): 98.5 (11 Sep 2018 07:49), Max: 99.1 (10 Sep 2018 23:41)  HR: 77 (11 Sep 2018 07:49) (77 - 96)  BP: 141/93 (11 Sep 2018 07:49) (134/75 - 150/78)  BP(mean): --  RR: 18 (11 Sep 2018 07:49) (17 - 18)  SpO2: 97% (11 Sep 2018 07:49) (97% - 99%)      LUNGS:         CLEAR TO AUSCULTATION , NO W/R/R  ABDOMEN    + BS, SOFT, NON DISTENDED, NOT TENDER, NO REBOUND/ RIGIDITY/ GUARDING   EXTREMITY:  NO EDEMA, NO CALF TENDERNESS                             11.8   4.95  )-----------( 107      ( 11 Sep 2018 06:30 )             33.8      09-11    144  |  108  |  9   ----------------------------<  76  3.3<L>   |  27  |  0.87    Ca    8.4<L>      11 Sep 2018 06:30  Phos  1.6     09-10  Mg     2.2     09-10    TPro  6.8  /  Alb  3.0<L>  /  TBili  1.9<H>  /  DBili  x   /  AST  32  /  ALT  24  /  AlkPhos  61  09-11                          ASSESSMENT &  PLAN: PERFORATED DIVERTICULITIS                             CLINICALLY STABLE  CONTINUE ZOSYN  START FULL LIQUID DIET   FOLLOW UP PROGRESS WITH SERIAL ABDOMINAL EXAMS , SYMPTOMS AND AM LABS

## 2018-09-11 NOTE — PROGRESS NOTE ADULT - SUBJECTIVE AND OBJECTIVE BOX
Patient is a 68y old  Male who presents with a chief complaint of abdominal pain (09 Sep 2018 08:36)      INTERVAL HPI:  68 year old M with PMHx of A.fib on Coumadin and digoxin, MI x1 stent (July 2012, LAD) and extensive history of diverticulitis (5 episodes in the past) who presented with LLQ abdominal pain starting at 4AM today. He describes the pain as sharp and constant. Additionally he was nauseous and "retching this morning". He states he was performing his routine ADL's yesterday. He did not take any medications for the pain. He was unable to take his medications today. He last ate yesterday evening. He reports feeling constipated for the last couple of days but had BM yesterday morning with bleeding  He denies fevers, chills, chest pain, palpitations, sob. Does not remember when his last colonoscopy was performed.     In the ED, the patient's vital signs were T: 98.8, , /88, R: 16, SpO2 98% on RA. EKG: Atrial fibrillation @110bpm. CXR: No evidence of active chest disease. CT abdomen with oral contrast: Perforated diverticulitis with moderate pneumoperitoneum. No intraperitoneal abscess. WBC of 10.76. CMP significant for bili of 1.4. Dr. Oseguera evaluated patient in ED, recommended ICU admission and non-surgical management at this time. Given the possibility of urgent surgery Pt was given K centra in ER, Pt Got IV Fluid Bolus & IV Zosyn x 1 in ER.    9/9/18: Pt seen, examined in ICU, Feels a lot better today, NPO, IV Fluids, IV Abx Pt had a BM. WBC- Normal.  9/10/18: Patient seen and examined at bedside in 2E. Patient stated that he had a BM yesterday, is passing flatus, and LLQ pain is significantly improved. He reported that he fell when getting up to use the bathroom last night, attributed it to taking Ambien for trouble sleeping. Patient stated that he feels fine now, having no pain, and does not want imaging. Pt had some clear liquid diet, D/W GI DR Tobin, D/W DR Oseguera- Keep NPO for Now, Possible clear liquid in AM. Restart Coumadin when pt tolerates PO diet.  9/11/18: Patient seen and examined at bedside. Patient reported that his abdominal pain had completely resolved. Patient felt sore on his right buttock but reported he was ambulating normally. Patient reported that Melatonin helped him sleep well and he woke up feeling "refreshed, like himself." Patient also stated that he felt emotional due to today being 9/11, was counseled extensively. Full liquid diet started today.    OVERNIGHT EVENTS: Patient refused IVF overnight as he did not want to keep waking up to urinate so frequently. A flutter @ 90s on tele monitor.    Home Medications:  alfuzosin 10 mg oral tablet, extended release: 1 tab(s) orally once a day (08 Sep 2018 16:31)  atorvastatin 40 mg oral tablet: 1 tab(s) orally once a day (08 Sep 2018 16:31)  Centrum Silver oral tablet: 1 tab(s) orally once a day (08 Sep 2018 16:31)  digoxin 125 mcg (0.125 mg) oral tablet: 1 tab(s) orally once a day (08 Sep 2018 16:31)  Ecotrin Adult Low Strength 81 mg oral delayed release tablet: 1 tab(s) orally once a day (08 Sep 2018 16:31)  Metoprolol Succinate  mg oral tablet, extended release: 1 tab(s) orally once a day (08 Sep 2018 16:31)  warfarin 5 mg oral tablet: 1 tab(s) orally Monday through Saturday  (08 Sep 2018 16:31)  warfarin 7.5 mg oral tablet: orally every Sunday (08 Sep 2018 16:31)    MEDICATIONS  (STANDING):  digoxin     Tablet 0.125 milliGRAM(s) Oral daily  enoxaparin Injectable 40 milliGRAM(s) SubCutaneous daily  influenza   Vaccine 0.5 milliLiter(s) IntraMuscular once  lactobacillus acidophilus 1 Tablet(s) Oral three times a day with meals  metoprolol tartrate 25 milliGRAM(s) Oral every 6 hours  piperacillin/tazobactam IVPB. 3.375 Gram(s) IV Intermittent every 8 hours  potassium acid phosphate/sodium acid phosphate tablet (K-PHOS No. 2) 1 Tablet(s) Oral four times a day with meals  potassium chloride    Tablet ER 20 milliEquivalent(s) Oral every 2 hours  sodium chloride 0.9%. 1000 milliLiter(s) (100 mL/Hr) IV Continuous <Continuous>  tamsulosin 0.4 milliGRAM(s) Oral at bedtime    MEDICATIONS  (PRN):  melatonin 3 milliGRAM(s) Oral at bedtime PRN Insomnia      Allergies  sulfa drugs (Rash)    Intolerances  lactose (Unknown)      REVIEW OF SYSTEMS: Feels well  CONSTITUTIONAL: No fever, No chills, No fatigue, No myalgia, No Body ache, No Weakness  EYES: No eye pain,  No visual disturbances, No discharge, No Redness  ENMT:  No ear pain, No nose bleed, No vertigo; No sinus or throat pain, No Congestion  NECK: No pain, No stiffness  RESPIRATORY: No cough, wheezing, No  hemoptysis, No shortness of breath  CARDIOVASCULAR: No chest pain, palpitations  GASTROINTESTINAL: No abdominal or epigastric pain. No nausea, No vomiting; No diarrhea or constipation. [ x ] BM  GENITOURINARY: No dysuria, No frequency, No urgency, No hematuria, or incontinence  NEUROLOGICAL: No headaches, No dizziness, No numbness, No tingling, No tremors, No weakness  EXT: No Swelling, No Pain, No Edema  SKIN:  [ x ] No itching, burning, rashes, or lesions   MUSCULOSKELETAL: No joint pain or swelling; No muscle pain, No back pain, No extremity pain  PSYCHIATRIC: No depression, anxiety, mood swings or difficulty sleeping at night  PAIN SCALE: [  ] None  [ x ] Other-Mild soreness at right buttock  ROS Unable to obtain due to - [  ] Dementia  [  ] Lethargy  [  ] Sedated [  ] non verbal  REST OF REVIEW Of SYSTEM - [ x ] Normal     Vital Signs Last 24 Hrs  T(C): 36.9 (11 Sep 2018 07:49), Max: 37.3 (10 Sep 2018 23:41)  T(F): 98.5 (11 Sep 2018 07:49), Max: 99.1 (10 Sep 2018 23:41)  HR: 77 (11 Sep 2018 07:49) (77 - 92)  BP: 141/93 (11 Sep 2018 07:49) (134/75 - 150/78)  BP(mean): --  RR: 18 (11 Sep 2018 07:49) (17 - 18)  SpO2: 97% (11 Sep 2018 07:49) (97% - 99%)      I&O's Summary    10 Sep 2018 07:01  -  11 Sep 2018 07:00  --------------------------------------------------------  IN: 1350 mL / OUT: 1650 mL / NET: -300 mL        PHYSICAL EXAM:  GENERAL:  [ x ] NAD , [ x ] well appearing, [  ] Agitated, [  ] Drowsy,  [  ] Lethargy, [  ] confused   HEAD:  [ x ] Normal, [  ] Other  EYES:  [ x ] EOMI, [ x ] PERRLA, [ x ] conjunctiva and sclera clear normal, [  ] Other,  [  ] Pallor, [  ] Discharge  ENMT:  [ x ] Normal, [ x ] Moist mucous membranes, [ x ] Good dentition, [x  ] No Thrush  NECK:  [ x ] Supple, [ x ] No JVD, [ x ] Normal thyroid, [  ] Lymphadenopathy [  ] Other  CHEST/LUNG:  [ x ] Clear to auscultation bilaterally, [ x ] Breath Sounds equal,  [ x ] No rales, [ x ] No rhonchi  [ x ]  No wheezing  HEART:  [x  ] Regular rate and rhythm, [  ] tachycardia, [  ] Bradycardia,  [ x ] irregular  [ x ] No murmurs, No rubs, No gallops, [  ] PPM in place (Mfr:  )  ABDOMEN:  [ x ] Soft, [ x ] Sore/mildly tender to palpation at LLQ rated 1/10, [ x ] Nondistended, [ x ] No mass, [ x ] Bowel sounds present, [  ] obese, No R/R/G  NERVOUS SYSTEM:  [ x ] Alert & Oriented X3, [ x ] Nonfocal  [  ] Confusion  [  ] Encephalopathic [  ] Sedated [  ] Unable to assess, [  ] Other-  EXTREMITIES: [ x ] 2+ Peripheral Pulses, No clubbing, No cyanosis,  [  ] edema B/L lower EXT. [  ] PVD stasis skin changes B/L Lower EXT  LYMPH: No lymphadenopathy noted  SKIN:  [ x ] No rashes or lesions, [  ] Pressure Ulcers, [ x ] R buttock hematoma, [  ] Skin Tears, [ x ] Other- L lateral thigh Bump    DIET: Full Liquid    LABS:                        11.8   4.95  )-----------( 107      ( 11 Sep 2018 06:30 )             33.8     11 Sep 2018 06:30    144    |  108    |  9      ----------------------------<  76     3.3     |  27     |  0.87     Ca    8.4        11 Sep 2018 06:30  Phos  1.6       10 Sep 2018 06:15  Mg     2.2       10 Sep 2018 06:15    TPro  6.8    /  Alb  3.0    /  TBili  1.9    /  DBili  x      /  AST  32     /  ALT  24     /  AlkPhos  61     11 Sep 2018 06:30    LIVER FUNCTIONS - ( 11 Sep 2018 06:30 )  Alb: 3.0 g/dL / Pro: 6.8 g/dL / ALK PHOS: 61 U/L / ALT: 24 U/L / AST: 32 U/L / GGT: x           PT/INR - ( 11 Sep 2018 06:30 )   PT: 13.4 sec;   INR: 1.22 ratio        Culture - Blood (collected 08 Sep 2018 18:53)  Source: .Blood Blood  Preliminary Report (09 Sep 2018 19:01):    No growth to date.    Culture - Blood (collected 08 Sep 2018 18:53)  Source: .Blood Blood  Preliminary Report (09 Sep 2018 19:01):    No growth to date.    Culture - Urine (collected 08 Sep 2018 16:49)  Source: .Urine Clean Catch (Midstream)  Final Report (09 Sep 2018 22:22):    <10,000 CFU/ml    Normal Urogenital bryce present       RADIOLOGY & ADDITIONAL TESTS: NONE      HEALTH ISSUES - PROBLEM Dx:  Need for prophylactic measure: Need for prophylactic measure  History of myocardial infarction: History of myocardial infarction  Atrial fibrillation: Atrial fibrillation  Diverticulitis of large intestine with perforation, unspecified bleeding status: Diverticulitis of large intestine with perforation, unspecified bleeding status        Consultant(s) Notes Reviewed:  [ x ] YES     Care Discussed with [ x ] Consultants  [ x ] Patient  [  ] Family  [  ]   [  ] Social Service  [ x ] RN, [  ] Physical Therapy  DVT PPX: [ x ] Lovenox, [  ] S C Heparin, [  ] Coumadin, [  ] Xarelto, [  ] Eliquis, [  ] Pradaxa, [  ] IV Heparin drip, [ x ] SCD, [  ] Contraindication 2 to GI Bleed, [ x ] Ambulation  Advanced directive: [ x ] None, [  ] DNR/DNI Patient is a 68y old  Male who presents with a chief complaint of abdominal pain (09 Sep 2018 08:36)      INTERVAL HPI:  68 year old M with PMHx of A.fib on Coumadin and digoxin, MI x1 stent (July 2012, LAD) and extensive history of diverticulitis (5 episodes in the past) who presented with LLQ abdominal pain starting at 4AM today. He describes the pain as sharp and constant. Additionally he was nauseous and "retching this morning". He states he was performing his routine ADL's yesterday. He did not take any medications for the pain. He was unable to take his medications today. He last ate yesterday evening. He reports feeling constipated for the last couple of days but had BM yesterday morning with bleeding  He denies fevers, chills, chest pain, palpitations, sob. Does not remember when his last colonoscopy was performed.     In the ED, the patient's vital signs were T: 98.8, , /88, R: 16, SpO2 98% on RA. EKG: Atrial fibrillation @110bpm. CXR: No evidence of active chest disease. CT abdomen with oral contrast: Perforated diverticulitis with moderate pneumoperitoneum. No intraperitoneal abscess. WBC of 10.76. CMP significant for bili of 1.4. Dr. Oseguera evaluated patient in ED, recommended ICU admission and non-surgical management at this time. Given the possibility of urgent surgery Pt was given K centra in ER, Pt Got IV Fluid Bolus & IV Zosyn x 1 in ER.    9/9/18: Pt seen, examined in ICU, Feels a lot better today, NPO, IV Fluids, IV Abx Pt had a BM. WBC- Normal.  9/10/18: Patient seen and examined at bedside in 2E. Patient stated that he had a BM yesterday, is passing flatus, and LLQ pain is significantly improved. He reported that he fell when getting up to use the bathroom last night, attributed it to taking Ambien for trouble sleeping. Patient stated that he feels fine now, having no pain, and does not want imaging. Pt had some clear liquid diet, D/W GI DR Tobin, D/W DR Oseguera- Keep NPO for Now, Possible clear liquid in AM. Restart Coumadin when pt tolerates PO diet.  9/11/18: Patient seen and examined at bedside. Patient reported that his abdominal pain had completely resolved. Patient felt sore on his right buttock but reported he was ambulating normally. Patient reported that Melatonin helped him sleep well and he woke up feeling "refreshed, like himself." Patient also stated that he felt emotional due to today being 9/11, was counseled extensively. Full liquid diet started today by surgery..    OVERNIGHT EVENTS: Patient refused IVF overnight as he did not want to keep waking up to urinate so frequently. A flutter @ 90s on tele monitor.    Home Medications:  alfuzosin 10 mg oral tablet, extended release: 1 tab(s) orally once a day (08 Sep 2018 16:31)  atorvastatin 40 mg oral tablet: 1 tab(s) orally once a day (08 Sep 2018 16:31)  Centrum Silver oral tablet: 1 tab(s) orally once a day (08 Sep 2018 16:31)  digoxin 125 mcg (0.125 mg) oral tablet: 1 tab(s) orally once a day (08 Sep 2018 16:31)  Ecotrin Adult Low Strength 81 mg oral delayed release tablet: 1 tab(s) orally once a day (08 Sep 2018 16:31)  Metoprolol Succinate  mg oral tablet, extended release: 1 tab(s) orally once a day (08 Sep 2018 16:31)  warfarin 5 mg oral tablet: 1 tab(s) orally Monday through Saturday  (08 Sep 2018 16:31)  warfarin 7.5 mg oral tablet: orally every Sunday (08 Sep 2018 16:31)    MEDICATIONS  (STANDING):  digoxin     Tablet 0.125 milliGRAM(s) Oral daily  enoxaparin Injectable 40 milliGRAM(s) SubCutaneous daily  influenza   Vaccine 0.5 milliLiter(s) IntraMuscular once  lactobacillus acidophilus 1 Tablet(s) Oral three times a day with meals  metoprolol tartrate 25 milliGRAM(s) Oral every 6 hours  piperacillin/tazobactam IVPB. 3.375 Gram(s) IV Intermittent every 8 hours  potassium acid phosphate/sodium acid phosphate tablet (K-PHOS No. 2) 1 Tablet(s) Oral four times a day with meals  potassium chloride    Tablet ER 20 milliEquivalent(s) Oral every 2 hours  sodium chloride 0.9%. 1000 milliLiter(s) (100 mL/Hr) IV Continuous <Continuous>  tamsulosin 0.4 milliGRAM(s) Oral at bedtime    MEDICATIONS  (PRN):  melatonin 3 milliGRAM(s) Oral at bedtime PRN Insomnia      Allergies  sulfa drugs (Rash)    Intolerances  lactose (Unknown)      REVIEW OF SYSTEMS: Feels well  CONSTITUTIONAL: No fever, No chills, No fatigue, No myalgia, No Body ache, No Weakness  EYES: No eye pain,  No visual disturbances, No discharge, No Redness  ENMT:  No ear pain, No nose bleed, No vertigo; No sinus or throat pain, No Congestion  NECK: No pain, No stiffness  RESPIRATORY: No cough, wheezing, No  hemoptysis, No shortness of breath  CARDIOVASCULAR: No chest pain, palpitations  GASTROINTESTINAL: No abdominal or epigastric pain. No nausea, No vomiting; No diarrhea or constipation. [ x ] BM  GENITOURINARY: No dysuria, No frequency, No urgency, No hematuria, or incontinence  NEUROLOGICAL: No headaches, No dizziness, No numbness, No tingling, No tremors, No weakness  EXT: No Swelling, No Pain, No Edema  SKIN:  [ x ] No itching, burning, rashes, or lesions   MUSCULOSKELETAL: No joint pain or swelling; No muscle pain, No back pain, No extremity pain  PSYCHIATRIC: No depression, anxiety, mood swings or difficulty sleeping at night  PAIN SCALE: [  ] None  [ x ] Other-Mild soreness at right buttock  ROS Unable to obtain due to - [  ] Dementia  [  ] Lethargy  [  ] Sedated [  ] non verbal  REST OF REVIEW Of SYSTEM - [ x ] Normal     Vital Signs Last 24 Hrs  T(C): 36.9 (11 Sep 2018 07:49), Max: 37.3 (10 Sep 2018 23:41)  T(F): 98.5 (11 Sep 2018 07:49), Max: 99.1 (10 Sep 2018 23:41)  HR: 77 (11 Sep 2018 07:49) (77 - 92)  BP: 141/93 (11 Sep 2018 07:49) (134/75 - 150/78)  BP(mean): --  RR: 18 (11 Sep 2018 07:49) (17 - 18)  SpO2: 97% (11 Sep 2018 07:49) (97% - 99%)      I&O's Summary    10 Sep 2018 07:01  -  11 Sep 2018 07:00  --------------------------------------------------------  IN: 1350 mL / OUT: 1650 mL / NET: -300 mL        PHYSICAL EXAM:  GENERAL:  [ x ] NAD , [ x ] well appearing, [  ] Agitated, [  ] Drowsy,  [  ] Lethargy, [  ] confused   HEAD:  [ x ] Normal, [  ] Other  EYES:  [ x ] EOMI, [ x ] PERRLA, [ x ] conjunctiva and sclera clear normal, [  ] Other,  [  ] Pallor, [  ] Discharge  ENMT:  [ x ] Normal, [ x ] Moist mucous membranes, [ x ] Good dentition, [x  ] No Thrush  NECK:  [ x ] Supple, [ x ] No JVD, [ x ] Normal thyroid, [  ] Lymphadenopathy [  ] Other  CHEST/LUNG:  [ x ] Clear to auscultation bilaterally, [ x ] Breath Sounds equal,  [ x ] No rales, [ x ] No rhonchi  [ x ]  No wheezing  HEART:  [x  ] Regular rate and rhythm, [  ] tachycardia, [  ] Bradycardia,  [ x ] irregular  [ x ] No murmurs, No rubs, No gallops, [  ] PPM in place (Mfr:  )  ABDOMEN:  [ x ] Soft, [ x ] Sore/mildly tender to palpation at LLQ rated 1/10, [ x ] Nondistended, [ x ] No mass, [ x ] Bowel sounds present, [  ] obese, No R/R/G  NERVOUS SYSTEM:  [ x ] Alert & Oriented X3, [ x ] Nonfocal  [  ] Confusion  [  ] Encephalopathic [  ] Sedated [  ] Unable to assess, [  ] Other-  EXTREMITIES: [ x ] 2+ Peripheral Pulses, No clubbing, No cyanosis,  [  ] edema B/L lower EXT. [  ] PVD stasis skin changes B/L Lower EXT  LYMPH: No lymphadenopathy noted  SKIN:  [ x ] No rashes or lesions, [  ] Pressure Ulcers, [ x ] R buttock hematoma, [  ] Skin Tears, [ x ] Other- L lateral thigh Bump    DIET: Full Liquid    LABS:                        11.8   4.95  )-----------( 107      ( 11 Sep 2018 06:30 )             33.8     11 Sep 2018 06:30    144    |  108    |  9      ----------------------------<  76     3.3     |  27     |  0.87     Ca    8.4        11 Sep 2018 06:30  Phos  1.6       10 Sep 2018 06:15  Mg     2.2       10 Sep 2018 06:15    TPro  6.8    /  Alb  3.0    /  TBili  1.9    /  DBili  x      /  AST  32     /  ALT  24     /  AlkPhos  61     11 Sep 2018 06:30    LIVER FUNCTIONS - ( 11 Sep 2018 06:30 )  Alb: 3.0 g/dL / Pro: 6.8 g/dL / ALK PHOS: 61 U/L / ALT: 24 U/L / AST: 32 U/L / GGT: x           PT/INR - ( 11 Sep 2018 06:30 )   PT: 13.4 sec;   INR: 1.22 ratio        Culture - Blood (collected 08 Sep 2018 18:53)  Source: .Blood Blood  Preliminary Report (09 Sep 2018 19:01):    No growth to date.    Culture - Blood (collected 08 Sep 2018 18:53)  Source: .Blood Blood  Preliminary Report (09 Sep 2018 19:01):    No growth to date.    Culture - Urine (collected 08 Sep 2018 16:49)  Source: .Urine Clean Catch (Midstream)  Final Report (09 Sep 2018 22:22):    <10,000 CFU/ml    Normal Urogenital bryce present       RADIOLOGY & ADDITIONAL TESTS: NONE      HEALTH ISSUES - PROBLEM Dx:  Need for prophylactic measure: Need for prophylactic measure  History of myocardial infarction: History of myocardial infarction  Atrial fibrillation: Atrial fibrillation  Diverticulitis of large intestine with perforation, unspecified bleeding status: Diverticulitis of large intestine with perforation, unspecified bleeding status        Consultant(s) Notes Reviewed:  [ x ] YES     Care Discussed with [ x ] Consultants  [ x ] Patient  [  ] Family  [  ]   [  ] Social Service  [ x ] RN, [  ] Physical Therapy  DVT PPX: [ x ] Lovenox, [  ] S C Heparin, [  ] Coumadin, [  ] Xarelto, [  ] Eliquis, [  ] Pradaxa, [  ] IV Heparin drip, [ x ] SCD, [  ] Contraindication 2 to GI Bleed, [ x ] Ambulation  Advanced directive: [ x ] None, [  ] DNR/DNI

## 2018-09-11 NOTE — PROGRESS NOTE ADULT - SUBJECTIVE AND OBJECTIVE BOX
INTERVAL HPI/OVERNIGHT EVENTS:  pt seen and examined  denies n/v/abd pain, no bm but passing gas  labs noted afebrile overnight    MEDICATIONS  (STANDING):  digoxin     Tablet 0.125 milliGRAM(s) Oral daily  enoxaparin Injectable 40 milliGRAM(s) SubCutaneous daily  influenza   Vaccine 0.5 milliLiter(s) IntraMuscular once  lactobacillus acidophilus 1 Tablet(s) Oral three times a day with meals  metoprolol tartrate 25 milliGRAM(s) Oral every 6 hours  pantoprazole  Injectable 40 milliGRAM(s) IV Push daily  piperacillin/tazobactam IVPB. 3.375 Gram(s) IV Intermittent every 8 hours  potassium acid phosphate/sodium acid phosphate tablet (K-PHOS No. 2) 1 Tablet(s) Oral four times a day with meals  sodium chloride 0.9%. 1000 milliLiter(s) (100 mL/Hr) IV Continuous <Continuous>  tamsulosin 0.4 milliGRAM(s) Oral at bedtime    MEDICATIONS  (PRN):  melatonin 3 milliGRAM(s) Oral at bedtime PRN Insomnia      Allergies    sulfa drugs (Rash)    Intolerances    lactose (Unknown)      Review of Systems:    General:  No wt loss, fevers, chills, night sweats, fatigue   Eyes:  Good vision, no reported pain  ENT:  No sore throat, pain, runny nose, dysphagia  CV:  No pain, palpitations, hypo/hypertension  Resp:  No dyspnea, cough, tachypnea, wheezing  GI:  No pain, No nausea, No vomiting, No diarrhea, No constipation, No weight loss, No fever, No pruritis, No rectal bleeding, No melena, No dysphagia  :  No pain, bleeding, incontinence, nocturia  Muscle:  No pain, weakness  Neuro:  No weakness, tingling, memory problems  Psych:  No fatigue, insomnia, mood problems, depression  Endocrine:  No polyuria, polydypsia, cold/heat intolerance  Heme:  No petechiae, ecchymosis, easy bruisability  Skin:  No rash, tattoos, scars, edema      Vital Signs Last 24 Hrs  T(C): 36.9 (11 Sep 2018 07:49), Max: 37.3 (10 Sep 2018 23:41)  T(F): 98.5 (11 Sep 2018 07:49), Max: 99.1 (10 Sep 2018 23:41)  HR: 77 (11 Sep 2018 07:49) (77 - 96)  BP: 141/93 (11 Sep 2018 07:49) (134/75 - 150/78)  BP(mean): --  RR: 18 (11 Sep 2018 07:49) (17 - 18)  SpO2: 97% (11 Sep 2018 07:49) (97% - 99%)    PHYSICAL EXAM:    Constitutional: NAD, lying in bed  HEENT: EOMI, perrl  Neck: No LAD  Respiratory: dec bs  Cardiovascular: S1 and S2, RRR  Gastrointestinal: soft nt nd  Extremities: No peripheral edema  Vascular: 2+ peripheral pulses  Neurological: Awake alert responds appropriately  Skin: No rashes      LABS:                        11.8   4.95  )-----------( 107      ( 11 Sep 2018 06:30 )             33.8     09-11    144  |  108  |  9   ----------------------------<  76  3.3<L>   |  27  |  0.87    Ca    8.4<L>      11 Sep 2018 06:30  Phos  1.6     09-10  Mg     2.2     09-10    TPro  6.8  /  Alb  3.0<L>  /  TBili  1.9<H>  /  DBili  x   /  AST  32  /  ALT  24  /  AlkPhos  61  09-11    PT/INR - ( 11 Sep 2018 06:30 )   PT: 13.4 sec;   INR: 1.22 ratio               RADIOLOGY & ADDITIONAL TESTS:

## 2018-09-11 NOTE — PROGRESS NOTE ADULT - ASSESSMENT
68 year old M with PMHx of A.fib on Coumadin and digoxin, MI x1 stent (July 2012, LAD) and extensive history of diverticulitis (5 episodes in the past) who presented with LLQ abdominal pain, admitted for perforated diverticulitis, stable.

## 2018-09-11 NOTE — PROGRESS NOTE ADULT - SUBJECTIVE AND OBJECTIVE BOX
NP Progress Note     [facility] -- Kate Lewis, Kayden, Fabian, Alex Clark Savella  Office # [phone]      Follow Up:  Consult    HPI:  68 year old M with PMHx of A.fib on Coumadin and digoxin, MI x1 stent (July 2012, LAD) and extensive history of diverticulitis (5 episodes in the past) who presented with LLQ abdominal pain starting at 4AM today. He describes the pain as sharp and constant. Additionally he was nauseous and "retching this morning". He states he was performing his routine ADL's yesterday. He did not take any medications for the pain. He was unable to take his medications today. He last ate yesterday evening. He reports feeling constipated for the last couple of days but had BM yesterday morning with bleeding  He denies fevers, chills, chest pain, palpitations, sob. Does not remember when his last colonoscopy was performed.     In the ED, the patient's vital signs were T: 98.8, , /88, R: 16, SpO2 98% on RA. EKG: Atrial fibrillation @110bpm. CXR: No evidence of active chest disease. CT abdomen with oral contrast: Perforated diverticulitis with moderate pneumoperitoneum. No intraperitoneal abscess. WBC of 10.76. CMP significant for bili of 1.4. Dr. Oseguera evaluated patient in ED, recommended ICU admission and non-surgical management at this time. Given the possibility of urgent surgery Pt was given K centra in ER, Pt Got IV Fluid Bolus & IV Zosyn x 1 in ER. (08 Sep 2018 15:05)        Subjective/Observations: Pt. seen and examined and evaluated. Pt. resting comfortably in bed in NAD, with no respiratory distress, no chest pain, dyspnea, palpitations, PND, or orthopnea.      REVIEW OF SYSTEMS: All other review of systems is negative unless indicated above    PAST MEDICAL & SURGICAL HISTORY:  Dyslipidemia  Essential hypertension  CAD (coronary artery disease): Stent 2012 [facility]  Atrial fibrillation  MI (myocardial infarction)  Diverticulitis  Dislocation of right shoulder joint, sequela: s/p surgery yrs ago  S/P tonsillectomy  S/P appendectomy      MEDICATIONS  (STANDING):  digoxin     Tablet 0.125 milliGRAM(s) Oral daily  enoxaparin Injectable 40 milliGRAM(s) SubCutaneous daily  influenza   Vaccine 0.5 milliLiter(s) IntraMuscular once  lactobacillus acidophilus 1 Tablet(s) Oral three times a day with meals  metoprolol tartrate 25 milliGRAM(s) Oral every 6 hours  piperacillin/tazobactam IVPB. 3.375 Gram(s) IV Intermittent every 8 hours  potassium acid phosphate/sodium acid phosphate tablet (K-PHOS No. 2) 1 Tablet(s) Oral four times a day with meals  potassium chloride    Tablet ER 20 milliEquivalent(s) Oral every 2 hours  sodium chloride 0.9%. 1000 milliLiter(s) (100 mL/Hr) IV Continuous <Continuous>  tamsulosin 0.4 milliGRAM(s) Oral at bedtime    MEDICATIONS  (PRN):  melatonin 3 milliGRAM(s) Oral at bedtime PRN Insomnia      Allergies    sulfa drugs (Rash)    Intolerances    lactose (Unknown)        Vital Signs Last 24 Hrs  T(C): 36.9 (11 Sep 2018 07:49), Max: 37.3 (10 Sep 2018 23:41)  T(F): 98.5 (11 Sep 2018 07:49), Max: 99.1 (10 Sep 2018 23:41)  HR: 77 (11 Sep 2018 07:49) (77 - 96)  BP: 141/93 (11 Sep 2018 07:49) (134/75 - 150/78)  BP(mean): --  RR: 18 (11 Sep 2018 07:49) (17 - 18)  SpO2: 97% (11 Sep 2018 07:49) (97% - 99%)    I&O's Summary    10 Sep 2018 07:01  -  11 Sep 2018 07:00  --------------------------------------------------------  IN: 1350 mL / OUT: 1650 mL / NET: -300 mL              LABS: All Labs Reviewed:                        11.8   4.95  )-----------( 107      ( 11 Sep 2018 06:30 )             33.8                11 Sep 2018 06:30    144    |  108    |  9      ----------------------------<  76     3.3     |  27     |  0.87     Ca    8.4        11 Sep 2018 06:30  Ca    8.7        10 Sep 2018 06:15  TPro  6.8    /  Alb  3.0    /  TBili  1.9    /  DBili  x      /  AST  32     /  ALT  24     /  AlkPhos  61     11 Sep 2018 06:30     Echo:    Stress Testing:     Cath:    Imaging:  < from: CT Abdomen and Pelvis w/ Oral Cont and w/ IV Cont (09.08.18 @ 14:17) >    LIVER: A few scattered subcentimeter hypodense lesions which are too   small to characterize  SPLEEN: Within normal limits.  PANCREAS: Within normal limits.  GALLBLADDER: Within normal limits.  BILE DUCTS: Normal caliber.  ADRENALS: Within normal limits.  KIDNEYS/URETERS: Several right renal cysts and multiple low-density   lesions in both kidneys which are too small to characterize. Punctate   nonobstructing right renal calculi. No hydronephrosis.    RETROPERITONEUM: No lymphadenopathy.    VESSELS:  Atherosclerotic arterial calcifications.  Normal caliber aorta.    BOWEL/PERITONEUM: Severe left-sided colonic diverticulosis and moderate   inflammatory changes adjacent to the proximal descending colon with small   pockets of extraluminal gas. Moderate amount of pneumoperitoneum. No   intraperitoneal abscess or ascites.  Appendix has been resected.    REPRODUCTIVE ORGANS: Markedly enlarged prostate.  BLADDER: Within normal limits.    ABDOMINAL WALL: Within normal limits.  BONES: No acute bony abnormality.    IMPRESSION: Perforated diverticulitis with moderate pneumoperitoneum. No   intraperitoneal abscess.      Interpretation of Telemetry:      Physical Exam:  Appearance: [ ] Normal  [ ] abnormal [ ] NAD   Eyes: [ ] PERRL [ ] EOMI  HEENT: [ ] Normal [ ] Abnormal oral mucosa [ ]NC/AT  Cardiovascular: [ ] S1 [ ] S2 [ ] RRR [ ] m/r/g [ ]edema [ ] JVP  Procedural Access Site: [ ]  hematoma [ ] tender to palpation [ ] 2+ pulse [ ] bruit [ ] Ecchymosis  Respiratory: [ ] Clear to auscultation bilaterally  Gastrointestinal: [ ] Soft [ ] tenderness[ ] distension [ ] BS  Musculoskeletal: [ ] clubbing [ ] joint deformity   Neurologic: [ ] Non-focal  Lymphatic: [ ] lymphadenopathy  Psychiatry: [ ] AAOx3  [ ] confused [ ] disoriented [ ] Mood & affect appropriate  Skin: [ ]  rashes [ ] ecchymoses [ ] cyanosis NP Progress Note     [facility] -- Kate Lewis, Kayden, Fabian, Alex Clark Savella  Office # [phone]      Follow Up:  Consult    HPI:  68 year old M with PMHx of A.fib on Coumadin and digoxin, MI x1 stent (July 2012, LAD) and extensive history of diverticulitis (5 episodes in the past) who presented with LLQ abdominal pain starting at 4AM today. He describes the pain as sharp and constant. Additionally he was nauseous and "retching this morning". He states he was performing his routine ADL's yesterday. He did not take any medications for the pain. He was unable to take his medications today. He last ate yesterday evening. He reports feeling constipated for the last couple of days but had BM yesterday morning with bleeding  He denies fevers, chills, chest pain, palpitations, sob. Does not remember when his last colonoscopy was performed.     In the ED, the patient's vital signs were T: 98.8, , /88, R: 16, SpO2 98% on RA. EKG: Atrial fibrillation @110bpm. CXR: No evidence of active chest disease. CT abdomen with oral contrast: Perforated diverticulitis with moderate pneumoperitoneum. No intraperitoneal abscess. WBC of 10.76. CMP significant for bili of 1.4. Dr. Oseguera evaluated patient in ED, recommended ICU admission and non-surgical management at this time. Given the possibility of urgent surgery Pt was given K centra in ER, Pt Got IV Fluid Bolus & IV Zosyn x 1 in ER. (08 Sep 2018 15:05)        Subjective/Observations: Pt. seen and examined and evaluated. Pt. resting comfortably in chair in NAD, with no respiratory distress, no chest pain, dyspnea, palpitations, PND, or orthopnea.      REVIEW OF SYSTEMS: All other review of systems is negative unless indicated above    PAST MEDICAL & SURGICAL HISTORY:  Dyslipidemia  Essential hypertension  CAD (coronary artery disease): Stent 2012 [facility]  Atrial fibrillation  MI (myocardial infarction)  Diverticulitis  Dislocation of right shoulder joint, sequela: s/p surgery yrs ago  S/P tonsillectomy  S/P appendectomy      MEDICATIONS  (STANDING):  digoxin     Tablet 0.125 milliGRAM(s) Oral daily  enoxaparin Injectable 40 milliGRAM(s) SubCutaneous daily  influenza   Vaccine 0.5 milliLiter(s) IntraMuscular once  lactobacillus acidophilus 1 Tablet(s) Oral three times a day with meals  metoprolol tartrate 25 milliGRAM(s) Oral every 6 hours  piperacillin/tazobactam IVPB. 3.375 Gram(s) IV Intermittent every 8 hours  potassium acid phosphate/sodium acid phosphate tablet (K-PHOS No. 2) 1 Tablet(s) Oral four times a day with meals  potassium chloride    Tablet ER 20 milliEquivalent(s) Oral every 2 hours  sodium chloride 0.9%. 1000 milliLiter(s) (100 mL/Hr) IV Continuous <Continuous>  tamsulosin 0.4 milliGRAM(s) Oral at bedtime    MEDICATIONS  (PRN):  melatonin 3 milliGRAM(s) Oral at bedtime PRN Insomnia      Allergies    sulfa drugs (Rash)    Intolerances    lactose (Unknown)        Vital Signs Last 24 Hrs  T(C): 36.9 (11 Sep 2018 07:49), Max: 37.3 (10 Sep 2018 23:41)  T(F): 98.5 (11 Sep 2018 07:49), Max: 99.1 (10 Sep 2018 23:41)  HR: 77 (11 Sep 2018 07:49) (77 - 96)  BP: 141/93 (11 Sep 2018 07:49) (134/75 - 150/78)  BP(mean): --  RR: 18 (11 Sep 2018 07:49) (17 - 18)  SpO2: 97% (11 Sep 2018 07:49) (97% - 99%)    I&O's Summary    10 Sep 2018 07:01  -  11 Sep 2018 07:00  --------------------------------------------------------  IN: 1350 mL / OUT: 1650 mL / NET: -300 mL              LABS: All Labs Reviewed:                        11.8   4.95  )-----------( 107      ( 11 Sep 2018 06:30 )             33.8                11 Sep 2018 06:30    144    |  108    |  9      ----------------------------<  76     3.3     |  27     |  0.87     Ca    8.4        11 Sep 2018 06:30  Ca    8.7        10 Sep 2018 06:15  TPro  6.8    /  Alb  3.0    /  TBili  1.9    /  DBili  x      /  AST  32     /  ALT  24     /  AlkPhos  61     11 Sep 2018 06:30     Echo:    Stress Testing:     Cath:    Imaging:  < from: CT Abdomen and Pelvis w/ Oral Cont and w/ IV Cont (09.08.18 @ 14:17) >    LIVER: A few scattered subcentimeter hypodense lesions which are too   small to characterize  SPLEEN: Within normal limits.  PANCREAS: Within normal limits.  GALLBLADDER: Within normal limits.  BILE DUCTS: Normal caliber.  ADRENALS: Within normal limits.  KIDNEYS/URETERS: Several right renal cysts and multiple low-density   lesions in both kidneys which are too small to characterize. Punctate   nonobstructing right renal calculi. No hydronephrosis.    RETROPERITONEUM: No lymphadenopathy.    VESSELS:  Atherosclerotic arterial calcifications.  Normal caliber aorta.    BOWEL/PERITONEUM: Severe left-sided colonic diverticulosis and moderate   inflammatory changes adjacent to the proximal descending colon with small   pockets of extraluminal gas. Moderate amount of pneumoperitoneum. No   intraperitoneal abscess or ascites.  Appendix has been resected.    REPRODUCTIVE ORGANS: Markedly enlarged prostate.  BLADDER: Within normal limits.    ABDOMINAL WALL: Within normal limits.  BONES: No acute bony abnormality.    IMPRESSION: Perforated diverticulitis with moderate pneumoperitoneum. No   intraperitoneal abscess.      Interpretation of Telemetry: Overnight on telemetry afib 80's       Physical Exam:  Appearance: [ ] Normal  [ ] abnormal [X ] NAD   Eyes: [ ] PERRL [ ] EOMI  HEENT: [ ] Normal [ ] Abnormal oral mucosa [ ]NC/AT  Cardiovascular: [X ] S1 [X ] S2 [ ] RRR [ ] m/r/g [ ]edema [ ] JVP  Procedural Access Site: [ ]  hematoma [ ] tender to palpation [ ] 2+ pulse [ ] bruit [ ] Ecchymosis  Respiratory: [X ] Clear to auscultation bilaterally  Gastrointestinal: [ ] Soft [ ] tenderness[ ] distension [ ] BS  Musculoskeletal: [ ] clubbing [ ] joint deformity   Neurologic: [ ] Non-focal  Lymphatic: [ ] lymphadenopathy  Psychiatry: [X ] AAOx3  [ ] confused [ ] disoriented [ ] Mood & affect appropriate  Skin: [ ]  rashes [ ] ecchymoses [ ] cyanosis

## 2018-09-11 NOTE — PROGRESS NOTE ADULT - PROBLEM SELECTOR PLAN 1
- Leukocytosis resolved.  - Consulted ID Dr Ricardo, continue IV Zosyn 3.375mg q8h.  - GI Dr Tobin on case.  - D/W surgery Dr Oseguera, non-surgical management, perforation clinically sealed off. IVF  cc/hr. Started full liquid diet today. - Leukocytosis resolved. Stable.  - Consulted ID Dr Ricardo, continue IV Zosyn 3.375mg q8h.  - GI Dr Tobin on case.  - D/W surgery Dr Oseguera, non-surgical management, perforation clinically sealed off. IVF  cc/hr. Started full liquid diet today.

## 2018-09-11 NOTE — PROGRESS NOTE ADULT - ASSESSMENT
68 year old M with PMHx of chronic af on dig/metoprolol, anticoagulated with warfarin.  He had an lad-territory MI in 2012, s/p pci.  No ischemic testing since then he believes.  He reports a normal ef, and he denies any sxs of angina, heart failure or hemodynamically compromising arrhythmia.  He has an extensive history of diverticulitis (5 episodes in the past) who presented with LLQ abdominal pain starting at 4AM today. He describes the pain as sharp and constant. Additionally he was nauseous and "retching this morning". He states he was performing his routine ADL's yesterday. He did not take any medications for the pain. He was unable to take his medications today. He last ate yesterday evening. He reports feeling constipated for the last couple of days but had BM yesterday morning with bleeding  He denies fevers, chills, chest pain, palpitations, sob. Does not remember when his last colonoscopy was performed.     In the ED, the patient's vital signs were T: 98.8, , /88, R: 16, SpO2 98% on RA. EKG: Atrial fibrillation @110bpm. CXR: No evidence of active chest disease. CT abdomen with oral contrast: Perforated diverticulitis with moderate pneumoperitoneum. No intraperitoneal abscess. WBC of 10.76. CMP significant for bili of 1.4. Dr. Oseguera evaluated patient in ED, recommended ICU admission and non-surgical management at this time. (08 Sep 2018 15:05)    Given the possibility of urgent surgery his inr was reversed.     Diverticulitis of large intestine with perforation, unspecified bleeding status.  -there is no evidence of acute ischemia.  -h/o cad  -cont asa  -cont bb  -cont statin  -there is no evidence of significant arrhythmia.  -chronic af on ac  - Now that abd process is resolving, rates are returning to normal  - Cont lopressor and dig at current PO doses.   -eventually to resume warfarin, goal inr 2-3  -there is no evidence for meaningful  volume overload.  -presumed to have a normal ef from his description    - if necessary from a cv perspective he would be considered optimized from a cv perspective for what may be urgent surgery.  Routine hemodynamic monitoring is recommended.  -thus far need for surgery is not felt to be immediate    -monitor electrolytes, keep k>4, Mg>2   -will follow 68 year old M with PMHx of chronic af on dig/metoprolol, anticoagulated with warfarin.  He had an lad-territory MI in 2012, s/p pci.  No ischemic testing since then he believes.  He reports a normal ef, and he denies any sxs of angina, heart failure or hemodynamically compromising arrhythmia.  He has an extensive history of diverticulitis (5 episodes in the past) who presented with LLQ abdominal pain starting at 4AM today. He describes the pain as sharp and constant. Additionally he was nauseous and "retching this morning". He states he was performing his routine ADL's yesterday. He did not take any medications for the pain. He was unable to take his medications today. He last ate yesterday evening. He reports feeling constipated for the last couple of days but had BM yesterday morning with bleeding  He denies fevers, chills, chest pain, palpitations, sob. Does not remember when his last colonoscopy was performed.     In the ED, the patient's vital signs were T: 98.8, , /88, R: 16, SpO2 98% on RA. EKG: Atrial fibrillation @110bpm. CXR: No evidence of active chest disease. CT abdomen with oral contrast: Perforated diverticulitis with moderate pneumoperitoneum. No intraperitoneal abscess. WBC of 10.76. CMP significant for bili of 1.4. Dr. Oseguera evaluated patient in ED, recommended ICU admission and non-surgical management at this time. (08 Sep 2018 15:05) Given the possibility of urgent surgery his INR was reversed.         Diverticulitis of large intestine with perforation, unspecified bleeding status.  - Thus far need for surgery is not felt to be immediate  - There is no evidence of acute ischemia, no evidence of significant arrhythmia, presumed to have a normal EF from his description  - If necessary from a CV perspective he would be considered optimized from a CV perspective for what may be urgent surgery, there is no evidence for meaningful  volume overload. Routine hemodynamic monitoring is recommended  - cont asa  - cont bb  - cont statin  -resume AC coumadin  for chronic afib as per surgery recommendations goal inr 2-3  -monitor electrolytes, keep k>4, Mg>2   -will follow 68 year old M with PMHx of chronic af on dig/metoprolol, anticoagulated with warfarin.  He had an lad-territory MI in 2012, s/p pci.  No ischemic testing since then he believes.  He reports a normal ef, and he denies any sxs of angina, heart failure or hemodynamically compromising arrhythmia.  He has an extensive history of diverticulitis (5 episodes in the past) who presented with LLQ abdominal pain starting at 4AM today. He describes the pain as sharp and constant. Additionally he was nauseous and "retching this morning". He states he was performing his routine ADL's yesterday. He did not take any medications for the pain. He was unable to take his medications today. He last ate yesterday evening. He reports feeling constipated for the last couple of days but had BM yesterday morning with bleeding  He denies fevers, chills, chest pain, palpitations, sob. Does not remember when his last colonoscopy was performed.     In the ED, the patient's vital signs were T: 98.8, , /88, R: 16, SpO2 98% on RA. EKG: Atrial fibrillation @110bpm. CXR: No evidence of active chest disease. CT abdomen with oral contrast: Perforated diverticulitis with moderate pneumoperitoneum. No intraperitoneal abscess. WBC of 10.76. CMP significant for bili of 1.4. Dr. Oseguera evaluated patient in ED, recommended ICU admission and non-surgical management at this time. (08 Sep 2018 15:05) Given the possibility of urgent surgery his INR was reversed.         Diverticulitis of large intestine with perforation, unspecified bleeding status.  - Thus far need for surgery is not felt to be immediate  - There is no evidence of acute ischemia, no evidence of significant arrhythmia, presumed to have a normal EF from his description  - If necessary from a CV perspective he would be considered optimized from a CV perspective for what may be urgent surgery, there is no evidence for meaningful  volume overload. Routine hemodynamic monitoring is recommended  - cont asa  - cont bb  - cont statin  -resume AC coumadin for chronic afib as per surgery recommendations goal inr 2-3  -monitor electrolytes, keep k>4, Mg>2   -will follow

## 2018-09-11 NOTE — PROGRESS NOTE ADULT - PROBLEM SELECTOR PLAN 2
Atrial fibrillation, HR stable  - A flutter @ 90s overnight.  - Continue PO Metoprolol 25mg q6h, PO Digoxin 0.125mg daily.  - Dig level 0.8 on 9/9.  - Cardiology Dr Peralta.  - Restart Coumadin when pt tolerates PO diet.

## 2018-09-11 NOTE — PROGRESS NOTE ADULT - PROBLEM SELECTOR PLAN 1
CT showed perforated diverticulitis with moderate pneumoperitoneum, no intraperitoneal abscess  fulls/ivf   f/u surgery recs  diet as per surgery  cont abx  cont bacid  bld cxs ngtd  monitor abd exam  pain control  will need outpatient colonoscopy in 6-8 wks, dw pt and agreeable

## 2018-09-12 LAB
ALBUMIN SERPL ELPH-MCNC: 3.2 G/DL — LOW (ref 3.3–5)
ALP SERPL-CCNC: 65 U/L — SIGNIFICANT CHANGE UP (ref 40–120)
ALT FLD-CCNC: 23 U/L — SIGNIFICANT CHANGE UP (ref 12–78)
ANION GAP SERPL CALC-SCNC: 8 MMOL/L — SIGNIFICANT CHANGE UP (ref 5–17)
APTT BLD: 32.2 SEC — SIGNIFICANT CHANGE UP (ref 27.5–37.4)
AST SERPL-CCNC: 28 U/L — SIGNIFICANT CHANGE UP (ref 15–37)
BILIRUB DIRECT SERPL-MCNC: 0.4 MG/DL — HIGH (ref 0.05–0.2)
BILIRUB INDIRECT FLD-MCNC: 1.2 MG/DL — HIGH (ref 0.2–1)
BILIRUB SERPL-MCNC: 1.6 MG/DL — HIGH (ref 0.2–1.2)
BUN SERPL-MCNC: 10 MG/DL — SIGNIFICANT CHANGE UP (ref 7–23)
CALCIUM SERPL-MCNC: 8.6 MG/DL — SIGNIFICANT CHANGE UP (ref 8.5–10.1)
CHLORIDE SERPL-SCNC: 107 MMOL/L — SIGNIFICANT CHANGE UP (ref 96–108)
CO2 SERPL-SCNC: 28 MMOL/L — SIGNIFICANT CHANGE UP (ref 22–31)
CREAT SERPL-MCNC: 0.95 MG/DL — SIGNIFICANT CHANGE UP (ref 0.5–1.3)
GLUCOSE SERPL-MCNC: 96 MG/DL — SIGNIFICANT CHANGE UP (ref 70–99)
HCT VFR BLD CALC: 35.7 % — LOW (ref 39–50)
HGB BLD-MCNC: 12.5 G/DL — LOW (ref 13–17)
INR BLD: 1.25 RATIO — HIGH (ref 0.88–1.16)
MCHC RBC-ENTMCNC: 30.2 PG — SIGNIFICANT CHANGE UP (ref 27–34)
MCHC RBC-ENTMCNC: 35 GM/DL — SIGNIFICANT CHANGE UP (ref 32–36)
MCV RBC AUTO: 86.2 FL — SIGNIFICANT CHANGE UP (ref 80–100)
NRBC # BLD: 0 /100 WBCS — SIGNIFICANT CHANGE UP (ref 0–0)
PLATELET # BLD AUTO: 113 K/UL — LOW (ref 150–400)
POTASSIUM SERPL-MCNC: 3.4 MMOL/L — LOW (ref 3.5–5.3)
POTASSIUM SERPL-SCNC: 3.4 MMOL/L — LOW (ref 3.5–5.3)
PROT SERPL-MCNC: 7 G/DL — SIGNIFICANT CHANGE UP (ref 6–8.3)
PROTHROM AB SERPL-ACNC: 13.7 SEC — HIGH (ref 9.8–12.7)
RBC # BLD: 4.14 M/UL — LOW (ref 4.2–5.8)
RBC # FLD: 13.2 % — SIGNIFICANT CHANGE UP (ref 10.3–14.5)
SODIUM SERPL-SCNC: 143 MMOL/L — SIGNIFICANT CHANGE UP (ref 135–145)
WBC # BLD: 4.05 K/UL — SIGNIFICANT CHANGE UP (ref 3.8–10.5)
WBC # FLD AUTO: 4.05 K/UL — SIGNIFICANT CHANGE UP (ref 3.8–10.5)

## 2018-09-12 PROCEDURE — 99232 SBSQ HOSP IP/OBS MODERATE 35: CPT

## 2018-09-12 RX ORDER — WARFARIN SODIUM 2.5 MG/1
2.5 TABLET ORAL ONCE
Qty: 0 | Refills: 0 | Status: COMPLETED | OUTPATIENT
Start: 2018-09-12 | End: 2018-09-12

## 2018-09-12 RX ORDER — METOPROLOL TARTRATE 50 MG
25 TABLET ORAL EVERY 6 HOURS
Qty: 0 | Refills: 0 | Status: COMPLETED | OUTPATIENT
Start: 2018-09-12 | End: 2018-09-13

## 2018-09-12 RX ORDER — POTASSIUM CHLORIDE 20 MEQ
20 PACKET (EA) ORAL
Qty: 0 | Refills: 0 | Status: COMPLETED | OUTPATIENT
Start: 2018-09-12 | End: 2018-09-12

## 2018-09-12 RX ORDER — ATORVASTATIN CALCIUM 80 MG/1
40 TABLET, FILM COATED ORAL AT BEDTIME
Qty: 0 | Refills: 0 | Status: DISCONTINUED | OUTPATIENT
Start: 2018-09-12 | End: 2018-09-13

## 2018-09-12 RX ORDER — DOCUSATE SODIUM 100 MG
100 CAPSULE ORAL THREE TIMES A DAY
Qty: 0 | Refills: 0 | Status: DISCONTINUED | OUTPATIENT
Start: 2018-09-12 | End: 2018-09-13

## 2018-09-12 RX ORDER — ASPIRIN/CALCIUM CARB/MAGNESIUM 324 MG
81 TABLET ORAL DAILY
Qty: 0 | Refills: 0 | Status: DISCONTINUED | OUTPATIENT
Start: 2018-09-12 | End: 2018-09-13

## 2018-09-12 RX ORDER — METOPROLOL TARTRATE 50 MG
200 TABLET ORAL DAILY
Qty: 0 | Refills: 0 | Status: DISCONTINUED | OUTPATIENT
Start: 2018-09-13 | End: 2018-09-13

## 2018-09-12 RX ORDER — METOPROLOL TARTRATE 50 MG
200 TABLET ORAL DAILY
Qty: 0 | Refills: 0 | Status: DISCONTINUED | OUTPATIENT
Start: 2018-09-12 | End: 2018-09-12

## 2018-09-12 RX ADMIN — Medication 1 TABLET(S): at 22:20

## 2018-09-12 RX ADMIN — Medication 25 MILLIGRAM(S): at 17:26

## 2018-09-12 RX ADMIN — Medication 25 MILLIGRAM(S): at 06:12

## 2018-09-12 RX ADMIN — Medication 20 MILLIEQUIVALENT(S): at 11:35

## 2018-09-12 RX ADMIN — ENOXAPARIN SODIUM 40 MILLIGRAM(S): 100 INJECTION SUBCUTANEOUS at 11:34

## 2018-09-12 RX ADMIN — Medication 1 TABLET(S): at 13:39

## 2018-09-12 RX ADMIN — Medication 1 TABLET(S): at 17:26

## 2018-09-12 RX ADMIN — Medication 1 TABLET(S): at 11:34

## 2018-09-12 RX ADMIN — Medication 0.12 MILLIGRAM(S): at 06:12

## 2018-09-12 RX ADMIN — Medication 1 TABLET(S): at 11:35

## 2018-09-12 RX ADMIN — PIPERACILLIN AND TAZOBACTAM 25 GRAM(S): 4; .5 INJECTION, POWDER, LYOPHILIZED, FOR SOLUTION INTRAVENOUS at 22:21

## 2018-09-12 RX ADMIN — Medication 100 MILLIGRAM(S): at 22:21

## 2018-09-12 RX ADMIN — Medication 20 MILLIEQUIVALENT(S): at 13:39

## 2018-09-12 RX ADMIN — Medication 81 MILLIGRAM(S): at 13:39

## 2018-09-12 RX ADMIN — Medication 1 TABLET(S): at 08:09

## 2018-09-12 RX ADMIN — PIPERACILLIN AND TAZOBACTAM 25 GRAM(S): 4; .5 INJECTION, POWDER, LYOPHILIZED, FOR SOLUTION INTRAVENOUS at 13:39

## 2018-09-12 RX ADMIN — WARFARIN SODIUM 2.5 MILLIGRAM(S): 2.5 TABLET ORAL at 22:21

## 2018-09-12 RX ADMIN — ATORVASTATIN CALCIUM 40 MILLIGRAM(S): 80 TABLET, FILM COATED ORAL at 22:21

## 2018-09-12 RX ADMIN — PIPERACILLIN AND TAZOBACTAM 25 GRAM(S): 4; .5 INJECTION, POWDER, LYOPHILIZED, FOR SOLUTION INTRAVENOUS at 06:12

## 2018-09-12 RX ADMIN — Medication 20 MILLIEQUIVALENT(S): at 09:06

## 2018-09-12 RX ADMIN — Medication 25 MILLIGRAM(S): at 11:35

## 2018-09-12 RX ADMIN — TAMSULOSIN HYDROCHLORIDE 0.4 MILLIGRAM(S): 0.4 CAPSULE ORAL at 22:20

## 2018-09-12 NOTE — PROGRESS NOTE ADULT - PROBLEM SELECTOR PLAN 1
continue medical management  IV zosyn  eventual colonoscopy  7 days course of antibiotics in total  transition to PO augmentin 875mg PO Q12H

## 2018-09-12 NOTE — PROGRESS NOTE ADULT - PROBLEM SELECTOR PLAN 4
2/2 acute perforated diverticulitis.  - On IV Abx.  - Improving; continue to monitor CBC. Improved, 2/2 acute perforated diverticulitis.  - On IV Abx.  - Improving; continue to monitor CBC.

## 2018-09-12 NOTE — PROGRESS NOTE ADULT - SUBJECTIVE AND OBJECTIVE BOX
VAISHNAVI REED  [id number]    GENERAL SURGERY/ DR. ORTA    NO N/V, FEVER, CHILLS, ABDOMINAL PAIN  TOLERATING FULL LIQUID DIET  + FLATUS AND BM     Vital Signs Last 24 Hrs  T(C): 36.7 (12 Sep 2018 06:00), Max: 36.9 (11 Sep 2018 07:49)  T(F): 98.1 (12 Sep 2018 06:00), Max: 98.5 (11 Sep 2018 07:49)  HR: 81 (12 Sep 2018 06:00) (69 - 96)  BP: 135/87 (12 Sep 2018 06:00) (127/78 - 141/93)  BP(mean): --  RR: 16 (12 Sep 2018 06:00) (16 - 18)  SpO2: 98% (12 Sep 2018 06:00) (97% - 99%)      LUNGS: CLEAR TO AUSCULTATION , NO W/R/R  ABDOMEN: + BS, SOFT, NON DISTENDED, NON TENDER  EXTREMITY: NO EDEMA, NO CALF TENDERNESS                            12.5   4.05  )-----------( 113      ( 12 Sep 2018 06:29 )             35.7      09-12    143  |  107  |  10  ----------------------------<  96  3.4<L>   |  28  |  0.95    Ca    8.6      12 Sep 2018 06:29    TPro  7.0  /  Alb  3.2<L>  /  TBili  1.6<H>  /  DBili  .40<H>  /  AST  28  /  ALT  23  /  AlkPhos  65  09-12    PT/INR - ( 12 Sep 2018 06:29 )   PT: 13.7 sec;   INR: 1.25 ratio     PTT - ( 12 Sep 2018 06:29 )  PTT:32.2 sec                         ASSESSMENT &  PLAN: PERFORATED DIVERTICULITIS                             CLINICALLY STABLE  CONTINUE ZOSYN  LOW RESIDUE DIET   MAY RESUME ANTICOAGULATION

## 2018-09-12 NOTE — PROGRESS NOTE ADULT - PROBLEM SELECTOR PLAN 3
Hx of MI in 2012 x1 stent  - Per cardio, resuming ASA 81mg and Statin.  - Continue PO Metoprolol 25mg q6h. Hx of MI in 2012 x1 stent  - Per cardio, resuming ASA 81mg and Statin.  - Continue PO Metoprolol 25mg q6h.Will restart PO Toprol XL daily starting from AM

## 2018-09-12 NOTE — PROGRESS NOTE ADULT - ASSESSMENT
68m with afib, cad, diverticulitis  admitted with abd pain  found to have perforated diverticulitis  doing well

## 2018-09-12 NOTE — PROGRESS NOTE ADULT - SUBJECTIVE AND OBJECTIVE BOX
INTERVAL HPI/OVERNIGHT EVENTS:  pt seen and examined  denies n/v/abd pain, +bm yesterday  tolerated fulls  labs noted afebrile overnight    MEDICATIONS  (STANDING):  aspirin  chewable 81 milliGRAM(s) Oral daily  atorvastatin 40 milliGRAM(s) Oral at bedtime  digoxin     Tablet 0.125 milliGRAM(s) Oral daily  enoxaparin Injectable 40 milliGRAM(s) SubCutaneous daily  influenza   Vaccine 0.5 milliLiter(s) IntraMuscular once  lactobacillus acidophilus 1 Tablet(s) Oral three times a day with meals  metoprolol tartrate 25 milliGRAM(s) Oral every 6 hours  piperacillin/tazobactam IVPB. 3.375 Gram(s) IV Intermittent every 8 hours  potassium acid phosphate/sodium acid phosphate tablet (K-PHOS No. 2) 1 Tablet(s) Oral four times a day with meals  potassium chloride    Tablet ER 20 milliEquivalent(s) Oral every 2 hours  tamsulosin 0.4 milliGRAM(s) Oral at bedtime  warfarin 2.5 milliGRAM(s) Oral once    MEDICATIONS  (PRN):  melatonin 3 milliGRAM(s) Oral at bedtime PRN Insomnia      Allergies    sulfa drugs (Rash)    Intolerances    lactose (Unknown)      Review of Systems:    General:  No wt loss, fevers, chills, night sweats, fatigue   Eyes:  Good vision, no reported pain  ENT:  No sore throat, pain, runny nose, dysphagia  CV:  No pain, palpitations, hypo/hypertension  Resp:  No dyspnea, cough, tachypnea, wheezing  GI:  No pain, No nausea, No vomiting, No diarrhea, No constipation, No weight loss, No fever, No pruritis, No rectal bleeding, No melena, No dysphagia  :  No pain, bleeding, incontinence, nocturia  Muscle:  No pain, weakness  Neuro:  No weakness, tingling, memory problems  Psych:  No fatigue, insomnia, mood problems, depression  Endocrine:  No polyuria, polydypsia, cold/heat intolerance  Heme:  No petechiae, ecchymosis, easy bruisability  Skin:  No rash, tattoos, scars, edema      Vital Signs Last 24 Hrs  T(C): 37 (12 Sep 2018 07:51), Max: 37 (12 Sep 2018 07:51)  T(F): 98.6 (12 Sep 2018 07:51), Max: 98.6 (12 Sep 2018 07:51)  HR: 76 (12 Sep 2018 07:51) (69 - 96)  BP: 137/80 (12 Sep 2018 07:51) (127/78 - 137/80)  BP(mean): --  RR: 16 (12 Sep 2018 07:51) (16 - 17)  SpO2: 97% (12 Sep 2018 07:51) (97% - 99%)    PHYSICAL EXAM:    Constitutional: NAD, lying in bed  HEENT: EOMI, perrl  Neck: No LAD  Respiratory: dec bs  Cardiovascular: S1 and S2, RRR  Gastrointestinal: soft nt nd  Extremities: No peripheral edema  Vascular: 2+ peripheral pulses  Neurological: Awake alert responds appropriately  Skin: No rashes      LABS:                        12.5   4.05  )-----------( 113      ( 12 Sep 2018 06:29 )             35.7     09-12    143  |  107  |  10  ----------------------------<  96  3.4<L>   |  28  |  0.95    Ca    8.6      12 Sep 2018 06:29  Mg     2.2     09-12    TPro  7.0  /  Alb  3.2<L>  /  TBili  1.6<H>  /  DBili  .40<H>  /  AST  28  /  ALT  23  /  AlkPhos  65  09-12    PT/INR - ( 12 Sep 2018 06:29 )   PT: 13.7 sec;   INR: 1.25 ratio         PTT - ( 12 Sep 2018 06:29 )  PTT:32.2 sec      RADIOLOGY & ADDITIONAL TESTS:

## 2018-09-12 NOTE — PROGRESS NOTE ADULT - PROBLEM SELECTOR PLAN 1
Stable.  - Consulted ID Dr Ricardo, continue IV Zosyn 3.375mg q8h.  - GI Dr Tobin on case.  - D/W surgery Dr Oseguera, non-surgical management, perforation clinically sealed off. Started low residue/fiber diet today. Stable.-On PO diet  - Consulted ID Dr Santana, continue IV Zosyn 3.375mg q8h.  - GI Dr Tobin on case.  - D/W surgery Dr Oseguera, non-surgical management, perforation clinically sealed off. Started low residue/fiber diet today. Stable.-On PO diet  - Consulted ID Dr Santana, continue IV Zosyn 3.375mg q8h.  - GI Dr Tobin on case. Abx, Po diet,   - D/W surgery Dr Oseguera, non-surgical management, perforation clinically sealed off. Started low residue/fiber diet today.  - plan for out pt colonoscopy in 8 weeks as out pt, & schedule for plan surgery as out pt.

## 2018-09-12 NOTE — PROGRESS NOTE ADULT - ASSESSMENT
68 year old M with PMHx of chronic af on dig/metoprolol, anticoagulated with warfarin.  He had an lad-territory MI in 2012, s/p pci.  No ischemic testing since then he believes.  He reports a normal ef, and he denies any sxs of angina, heart failure or hemodynamically compromising arrhythmia.  He has an extensive history of diverticulitis (5 episodes in the past) who presented with LLQ abdominal pain starting at 4AM today. He describes the pain as sharp and constant. Additionally he was nauseous and "retching this morning". He states he was   Diverticulitis of large intestine with perforation, unspecified bleeding status.  - Thus far need for surgery is not felt to be immediate  - There is no evidence of acute ischemia, no evidence of significant arrhythmia, presumed to have a normal EF from his description  - If necessary from a CV perspective he would be considered optimized from a CV perspective for what may be urgent surgery, there is no evidence for meaningful  volume overload. Routine hemodynamic monitoring is recommended  - cont asa  - cont bb  - cont statin  - Continue AC coumadin for chronic afib as per surgery recommendations goal inr 2-3  -monitor electrolytes, keep k>4, Mg>2   -will follow  Rosana Batres, MSN,  FNP 68 year old M with PMHx of chronic af on dig/metoprolol, anticoagulated with warfarin.  He had an lad-territory MI in 2012, s/p pci.  No ischemic testing since then he believes.  He reports a normal ef, and he denies any sxs of angina, heart failure or hemodynamically compromising arrhythmia.  He has an extensive history of diverticulitis (5 episodes in the past) who presented with LLQ abdominal pain starting at 4AM today. He describes the pain as sharp and constant. Additionally he was nauseous and "retching this morning". He states he was   Diverticulitis of large intestine with perforation, unspecified bleeding status.    - Thus far need for surgery is not felt to be necessary, and he is clinically improving  - There is no evidence of acute ischemia, no evidence of significant arrhythmia, presumed to have a normal EF from his description  - cont asa  - cont bb, now that tolerating po medications  - cont statin  - Restart AC coumadin for chronic afib as per surgery recommendations goal inr 2-3  - monitor electrolytes, keep k>4, Mg>2   - will follow  Rosana Batres, MSN,  FNP

## 2018-09-12 NOTE — PROGRESS NOTE ADULT - SUBJECTIVE AND OBJECTIVE BOX
[facility] -- Kate Lewis, Kayden, Fabian, Eduardo, Shu Johnson  Office # [phone]      Follow Up:  A fib  HPI:  68 year old M with PMHx of Aaamir on Coumadin and digoxin, MI x1 stent (July 2012, LAD) and extensive history of diverticulitis (5 episodes in the past) who presented with LLQ abdominal pain starting at 4AM today. He describes the pain as sharp and constant. Additionally he was nauseous and "retching this morning". He states he was performing his routine ADL's yesterday. He did not take any medications for the pain. He was unable to take his medications today. He last ate yesterday evening. He reports feeling constipated for the last couple of days but had BM yesterday morning with bleeding  He denies fevers, chills, chest pain, palpitations, sob. Does not remember when his last colonoscopy was performed.     In the ED, the patient's vital signs were T: 98.8, , /88, R: 16, SpO2 98% on RA. EKG: Atrial fibrillation @110bpm. CXR: No evidence of active chest disease. CT abdomen with oral contrast: Perforated diverticulitis with moderate pneumoperitoneum. No intraperitoneal abscess. WBC of 10.76. CMP significant for bili of 1.4. Dr. Oseguera evaluated patient in ED, recommended ICU admission and non-surgical management at this time. Given the possibility of urgent surgery Pt was given K centra in ER, Pt Got IV Fluid Bolus & IV Zosyn x 1 in ER. (08 Sep 2018 15:05)    Subjective/Observations: Pt. seen and examined and evaluated. Pt. resting comfortably in chair in NAD, with no respiratory distress, no chest pain, dyspnea, palpitations, PND, or orthopnea.        REVIEW OF SYSTEMS: All other review of systems is negative unless indicated above    PAST MEDICAL & SURGICAL HISTORY:  Dyslipidemia  Essential hypertension  CAD (coronary artery disease): Stent 2012 [facility]  Atrial fibrillation  MI (myocardial infarction)  Diverticulitis  Dislocation of right shoulder joint, sequela: s/p surgery yrs ago  S/P tonsillectomy  S/P appendectomy      MEDICATIONS  (STANDING):  aspirin  chewable 81 milliGRAM(s) Oral daily  atorvastatin 40 milliGRAM(s) Oral at bedtime  digoxin     Tablet 0.125 milliGRAM(s) Oral daily  enoxaparin Injectable 40 milliGRAM(s) SubCutaneous daily  influenza   Vaccine 0.5 milliLiter(s) IntraMuscular once  lactobacillus acidophilus 1 Tablet(s) Oral three times a day with meals  metoprolol tartrate 25 milliGRAM(s) Oral every 6 hours  multivitamin 1 Tablet(s) Oral daily  piperacillin/tazobactam IVPB. 3.375 Gram(s) IV Intermittent every 8 hours  potassium acid phosphate/sodium acid phosphate tablet (K-PHOS No. 2) 1 Tablet(s) Oral four times a day with meals  potassium chloride    Tablet ER 20 milliEquivalent(s) Oral every 2 hours  tamsulosin 0.4 milliGRAM(s) Oral at bedtime  warfarin 2.5 milliGRAM(s) Oral once    MEDICATIONS  (PRN):  melatonin 3 milliGRAM(s) Oral at bedtime PRN Insomnia      Allergies    sulfa drugs (Rash)    Intolerances    lactose (Unknown)          Vital Signs Last 24 Hrs  T(C): 37 (12 Sep 2018 07:51), Max: 37 (12 Sep 2018 07:51)  T(F): 98.6 (12 Sep 2018 07:51), Max: 98.6 (12 Sep 2018 07:51)  HR: 76 (12 Sep 2018 07:51) (76 - 96)  BP: 137/80 (12 Sep 2018 07:51) (127/78 - 137/80)  BP(mean): --  RR: 16 (12 Sep 2018 07:51) (16 - 17)  SpO2: 97% (12 Sep 2018 07:51) (97% - 99%)    I&O's Summary    11 Sep 2018 07:01  -  12 Sep 2018 07:00  --------------------------------------------------------  IN: 860 mL / OUT: 1000 mL / NET: -140 mL    12 Sep 2018 07:01  -  12 Sep 2018 13:31  --------------------------------------------------------  IN: 300 mL / OUT: 750 mL / NET: -450 mL          PHYSICAL EXAM:  TELE:  A fib 70- 80's  Constitutional: NAD, awake and alert, well-developed  HEENT: Moist Mucous Membranes, Anicteric  Pulmonary: Non-labored, breath sounds are clear bilaterally, No wheezing, rales or rhonchi  Cardiovascular: Regular, S1 and S2, No murmurs, rubs, gallops or clicks  Gastrointestinal: Bowel Sounds present, soft, nontender.   Lymph: No peripheral edema. No lymphadenopathy.  Skin: No visible rashes or ulcers.  Psych:  Mood & affect appropriate    LABS: All Labs Reviewed:                        12.5   4.05  )-----------( 113      ( 12 Sep 2018 06:29 )             35.7                         11.8   4.95  )-----------( 107      ( 11 Sep 2018 06:30 )             33.8                         11.8   7.15  )-----------( 105      ( 10 Sep 2018 06:15 )             34.8     12 Sep 2018 06:29    143    |  107    |  10     ----------------------------<  96     3.4     |  28     |  0.95   11 Sep 2018 06:30    144    |  108    |  9      ----------------------------<  76     3.3     |  27     |  0.87   10 Sep 2018 06:15    144    |  108    |  12     ----------------------------<  99     3.6     |  28     |  0.93     Ca    8.6        12 Sep 2018 06:29  Ca    8.4        11 Sep 2018 06:30  Ca    8.7        10 Sep 2018 06:15  Phos  1.6       10 Sep 2018 06:15  Mg     2.2       12 Sep 2018 06:29  Mg     2.2       10 Sep 2018 06:15    TPro  7.0    /  Alb  3.2    /  TBili  1.6    /  DBili  .40    /  AST  28     /  ALT  23     /  AlkPhos  65     12 Sep 2018 06:29  TPro  6.8    /  Alb  3.0    /  TBili  1.9    /  DBili  x      /  AST  32     /  ALT  24     /  AlkPhos  61     11 Sep 2018 06:30  TPro  6.7    /  Alb  3.1    /  TBili  1.8    /  DBili  x      /  AST  26     /  ALT  25     /  AlkPhos  63     10 Sep 2018 06:15    PT/INR - ( 12 Sep 2018 06:29 )   PT: 13.7 sec;   INR: 1.25 ratio         PTT - ( 12 Sep 2018 06:29 )  PTT:32.2 sec    Imaging:  < from: CT Abdomen and Pelvis w/ Oral Cont and w/ IV Cont (09.08.18 @ 14:17) >    LIVER: A few scattered subcentimeter hypodense lesions which are too   small to characterize  SPLEEN: Within normal limits.  PANCREAS: Within normal limits.  GALLBLADDER: Within normal limits.  BILE DUCTS: Normal caliber.  ADRENALS: Within normal limits.  KIDNEYS/URETERS: Several right renal cysts and multiple low-density   lesions in both kidneys which are too small to characterize. Punctate   nonobstructing right renal calculi. No hydronephrosis.    RETROPERITONEUM: No lymphadenopathy.    VESSELS:  Atherosclerotic arterial calcifications.  Normal caliber aorta.    BOWEL/PERITONEUM: Severe left-sided colonic diverticulosis and moderate   inflammatory changes adjacent to the proximal descending colon with small   pockets of extraluminal gas. Moderate amount of pneumoperitoneum. No   intraperitoneal abscess or ascites.  Appendix has been resected.    REPRODUCTIVE ORGANS: Markedly enlarged prostate.  BLADDER: Within normal limits. [facility] -- Kate Lewis, Kayden, Fabian, Alex Clark Savella  Office # [phone]      Follow Up:  A fib  HPI:  68 year old M with PMHx of Ju on Coumadin and digoxin, MI x1 stent (July 2012, LAD) and extensive history of diverticulitis (5 episodes in the past) who presented with LLQ abdominal pain starting at 4AM today. He describes the pain as sharp and constant. Additionally he was nauseous and "retching this morning". He states he was performing his routine ADL's yesterday. He did not take any medications for the pain. He was unable to take his medications today. He last ate yesterday evening. He reports feeling constipated for the last couple of days but had BM yesterday morning with bleeding  He denies fevers, chills, chest pain, palpitations, sob. Does not remember when his last colonoscopy was performed.       Subjective/Observations: Pt. seen and examined and evaluated. Pt. resting comfortably in chair in NAD, with no respiratory distress, no chest pain, dyspnea, palpitations, PND, or orthopnea.        REVIEW OF SYSTEMS: All other review of systems is negative unless indicated above    PAST MEDICAL & SURGICAL HISTORY:  Dyslipidemia  Essential hypertension  CAD (coronary artery disease): Stent 2012 [facility]  Atrial fibrillation  MI (myocardial infarction)  Diverticulitis  Dislocation of right shoulder joint, sequela: s/p surgery yrs ago  S/P tonsillectomy  S/P appendectomy      MEDICATIONS  (STANDING):  aspirin  chewable 81 milliGRAM(s) Oral daily  atorvastatin 40 milliGRAM(s) Oral at bedtime  digoxin     Tablet 0.125 milliGRAM(s) Oral daily  enoxaparin Injectable 40 milliGRAM(s) SubCutaneous daily  influenza   Vaccine 0.5 milliLiter(s) IntraMuscular once  lactobacillus acidophilus 1 Tablet(s) Oral three times a day with meals  metoprolol tartrate 25 milliGRAM(s) Oral every 6 hours  multivitamin 1 Tablet(s) Oral daily  piperacillin/tazobactam IVPB. 3.375 Gram(s) IV Intermittent every 8 hours  potassium acid phosphate/sodium acid phosphate tablet (K-PHOS No. 2) 1 Tablet(s) Oral four times a day with meals  potassium chloride    Tablet ER 20 milliEquivalent(s) Oral every 2 hours  tamsulosin 0.4 milliGRAM(s) Oral at bedtime  warfarin 2.5 milliGRAM(s) Oral once    MEDICATIONS  (PRN):  melatonin 3 milliGRAM(s) Oral at bedtime PRN Insomnia      Allergies    sulfa drugs (Rash)    Intolerances    lactose (Unknown)          Vital Signs Last 24 Hrs  T(C): 37 (12 Sep 2018 07:51), Max: 37 (12 Sep 2018 07:51)  T(F): 98.6 (12 Sep 2018 07:51), Max: 98.6 (12 Sep 2018 07:51)  HR: 76 (12 Sep 2018 07:51) (76 - 96)  BP: 137/80 (12 Sep 2018 07:51) (127/78 - 137/80)  BP(mean): --  RR: 16 (12 Sep 2018 07:51) (16 - 17)  SpO2: 97% (12 Sep 2018 07:51) (97% - 99%)    I&O's Summary    11 Sep 2018 07:01  -  12 Sep 2018 07:00  --------------------------------------------------------  IN: 860 mL / OUT: 1000 mL / NET: -140 mL    12 Sep 2018 07:01  -  12 Sep 2018 13:31  --------------------------------------------------------  IN: 300 mL / OUT: 750 mL / NET: -450 mL          PHYSICAL EXAM:  TELE:  A fib 70- 80's  Constitutional: NAD, awake and alert, well-developed  HEENT: Moist Mucous Membranes, Anicteric  Pulmonary: Non-labored, breath sounds are clear bilaterally, No wheezing, rales or rhonchi  Cardiovascular: Regular, S1 and S2, No murmurs, rubs, gallops or clicks  Gastrointestinal: Bowel Sounds present, soft, nontender.   Lymph: No peripheral edema. No lymphadenopathy.  Skin: No visible rashes or ulcers.  Psych:  Mood & affect appropriate    LABS: All Labs Reviewed:                        12.5 4.05  )-----------( 113      ( 12 Sep 2018 06:29 )             35.7                         11.8   4.95  )-----------( 107      ( 11 Sep 2018 06:30 )             33.8                         11.8   7.15  )-----------( 105      ( 10 Sep 2018 06:15 )             34.8     12 Sep 2018 06:29    143    |  107    |  10     ----------------------------<  96     3.4     |  28     |  0.95   11 Sep 2018 06:30    144    |  108    |  9      ----------------------------<  76     3.3     |  27     |  0.87   10 Sep 2018 06:15    144    |  108    |  12     ----------------------------<  99     3.6     |  28     |  0.93     Ca    8.6        12 Sep 2018 06:29  Ca    8.4        11 Sep 2018 06:30  Ca    8.7        10 Sep 2018 06:15  Phos  1.6       10 Sep 2018 06:15  Mg     2.2       12 Sep 2018 06:29  Mg     2.2       10 Sep 2018 06:15    TPro  7.0    /  Alb  3.2    /  TBili  1.6    /  DBili  .40    /  AST  28     /  ALT  23     /  AlkPhos  65     12 Sep 2018 06:29  TPro  6.8    /  Alb  3.0    /  TBili  1.9    /  DBili  x      /  AST  32     /  ALT  24     /  AlkPhos  61     11 Sep 2018 06:30  TPro  6.7    /  Alb  3.1    /  TBili  1.8    /  DBili  x      /  AST  26     /  ALT  25     /  AlkPhos  63     10 Sep 2018 06:15    PT/INR - ( 12 Sep 2018 06:29 )   PT: 13.7 sec;   INR: 1.25 ratio         PTT - ( 12 Sep 2018 06:29 )  PTT:32.2 sec    Imaging:  < from: CT Abdomen and Pelvis w/ Oral Cont and w/ IV Cont (09.08.18 @ 14:17) >    LIVER: A few scattered subcentimeter hypodense lesions which are too   small to characterize  SPLEEN: Within normal limits.  PANCREAS: Within normal limits.  GALLBLADDER: Within normal limits.  BILE DUCTS: Normal caliber.  ADRENALS: Within normal limits.  KIDNEYS/URETERS: Several right renal cysts and multiple low-density   lesions in both kidneys which are too small to characterize. Punctate   nonobstructing right renal calculi. No hydronephrosis.    RETROPERITONEUM: No lymphadenopathy.    VESSELS:  Atherosclerotic arterial calcifications.  Normal caliber aorta.    BOWEL/PERITONEUM: Severe left-sided colonic diverticulosis and moderate   inflammatory changes adjacent to the proximal descending colon with small   pockets of extraluminal gas. Moderate amount of pneumoperitoneum. No   intraperitoneal abscess or ascites.  Appendix has been resected.    REPRODUCTIVE ORGANS: Markedly enlarged prostate.  BLADDER: Within normal limits.

## 2018-09-12 NOTE — PROGRESS NOTE ADULT - SUBJECTIVE AND OBJECTIVE BOX
Patient is a 68y old  Male who presents with a chief complaint of abdominal pain (09 Sep 2018 08:36)      INTERVAL HPI:  68 year old M with PMHx of A.fib on Coumadin and digoxin, MI x1 stent (July 2012, LAD) and extensive history of diverticulitis (5 episodes in the past) who presented with LLQ abdominal pain starting at 4AM today. He describes the pain as sharp and constant. Additionally he was nauseous and "retching this morning". He states he was performing his routine ADL's yesterday. He did not take any medications for the pain. He was unable to take his medications today. He last ate yesterday evening. He reports feeling constipated for the last couple of days but had BM yesterday morning with bleeding  He denies fevers, chills, chest pain, palpitations, sob. Does not remember when his last colonoscopy was performed.     In the ED, the patient's vital signs were T: 98.8, , /88, R: 16, SpO2 98% on RA. EKG: Atrial fibrillation @110bpm. CXR: No evidence of active chest disease. CT abdomen with oral contrast: Perforated diverticulitis with moderate pneumoperitoneum. No intraperitoneal abscess. WBC of 10.76. CMP significant for bili of 1.4. Dr. Oseguera evaluated patient in ED, recommended ICU admission and non-surgical management at this time. Given the possibility of urgent surgery Pt was given K centra in ER, Pt Got IV Fluid Bolus & IV Zosyn x 1 in ER.    9/9/18: Pt seen, examined in ICU, Feels a lot better today, NPO, IV Fluids, IV Abx Pt had a BM. WBC- Normal.  9/10/18: Patient seen and examined at bedside in 2E. Patient stated that he had a BM yesterday, is passing flatus, and LLQ pain is significantly improved. He reported that he fell when getting up to use the bathroom last night, attributed it to taking Ambien for trouble sleeping. Patient stated that he feels fine now, having no pain, and does not want imaging. Pt had some clear liquid diet, D/W GI DR Tobin, D/W DR Oseguera- Keep NPO for Now, Possible clear liquid in AM. Restart Coumadin when pt tolerates PO diet.  9/11/18: Patient seen and examined at bedside. Patient reported that his abdominal pain had completely resolved. Patient felt sore on his right buttock but reported he was ambulating normally. Patient reported that Melatonin helped him sleep well and he woke up feeling "refreshed, like himself." Patient also stated that he felt emotional due to today being 9/11, was counseled extensively. Full liquid diet started today by surgery.  9/12/18: Patient seen and examined at bedside, reported that he felt well, having regular BMs and had no complaints. Low residue diet started by surgery. Per surgery and cardio, starting back on Coumadin today.    OVERNIGHT EVENTS: None    Home Medications:  alfuzosin 10 mg oral tablet, extended release: 1 tab(s) orally once a day (08 Sep 2018 16:31)  atorvastatin 40 mg oral tablet: 1 tab(s) orally once a day (08 Sep 2018 16:31)  Centrum Silver oral tablet: 1 tab(s) orally once a day (08 Sep 2018 16:31)  digoxin 125 mcg (0.125 mg) oral tablet: 1 tab(s) orally once a day (08 Sep 2018 16:31)  Ecotrin Adult Low Strength 81 mg oral delayed release tablet: 1 tab(s) orally once a day (08 Sep 2018 16:31)  Metoprolol Succinate  mg oral tablet, extended release: 1 tab(s) orally once a day (08 Sep 2018 16:31)  warfarin 5 mg oral tablet: 1 tab(s) orally Monday through Saturday  (08 Sep 2018 16:31)  warfarin 7.5 mg oral tablet: orally every Sunday (08 Sep 2018 16:31)    MEDICATIONS  (STANDING):  digoxin     Tablet 0.125 milliGRAM(s) Oral daily  enoxaparin Injectable 40 milliGRAM(s) SubCutaneous daily  influenza   Vaccine 0.5 milliLiter(s) IntraMuscular once  lactobacillus acidophilus 1 Tablet(s) Oral three times a day with meals  metoprolol tartrate 25 milliGRAM(s) Oral every 6 hours  piperacillin/tazobactam IVPB. 3.375 Gram(s) IV Intermittent every 8 hours  potassium acid phosphate/sodium acid phosphate tablet (K-PHOS No. 2) 1 Tablet(s) Oral four times a day with meals  potassium chloride    Tablet ER 20 milliEquivalent(s) Oral every 2 hours  tamsulosin 0.4 milliGRAM(s) Oral at bedtime  warfarin 2.5 milliGRAM(s) Oral once    MEDICATIONS  (PRN):  melatonin 3 milliGRAM(s) Oral at bedtime PRN Insomnia    Allergies  sulfa drugs (Rash)    Intolerances  lactose (Unknown)      REVIEW OF SYSTEMS: Feels well  CONSTITUTIONAL: No fever, No chills, No fatigue, No myalgia, No Body ache, No Weakness  EYES: No eye pain,  No visual disturbances, No discharge, No Redness  ENMT:  No ear pain, No nose bleed, No vertigo; No sinus or throat pain, No Congestion  NECK: No pain, No stiffness  RESPIRATORY: No cough, wheezing, No  hemoptysis, No shortness of breath  CARDIOVASCULAR: No chest pain, palpitations  GASTROINTESTINAL: No abdominal or epigastric pain. No nausea, No vomiting; No diarrhea or constipation. [ x ] regular BM  GENITOURINARY: No dysuria, No frequency, No urgency, No hematuria, or incontinence  NEUROLOGICAL: No headaches, No dizziness, No numbness, No tingling, No tremors, No weakness  EXT: No Swelling, No Pain, No Edema  SKIN: [ x ] No itching, burning, rashes, or lesions   MUSCULOSKELETAL: No joint pain or swelling; No muscle pain, No back pain, No extremity pain  PSYCHIATRIC: No depression, anxiety, mood swings or difficulty sleeping at night  PAIN SCALE: [  ] None  [ x ] Other-Mild soreness at right buttock (improved)  ROS Unable to obtain due to - [  ] Dementia  [  ] Lethargy  [  ] Sedated [  ] non verbal  REST OF REVIEW Of SYSTEM - [ x ] Normal     Vital Signs Last 24 Hrs  T(C): 37 (12 Sep 2018 07:51), Max: 37 (12 Sep 2018 07:51)  T(F): 98.6 (12 Sep 2018 07:51), Max: 98.6 (12 Sep 2018 07:51)  HR: 76 (12 Sep 2018 07:51) (69 - 96)  BP: 137/80 (12 Sep 2018 07:51) (127/78 - 137/80)  BP(mean): --  RR: 16 (12 Sep 2018 07:51) (16 - 17)  SpO2: 97% (12 Sep 2018 07:51) (97% - 99%)    I&O's Summary    11 Sep 2018 07:01  -  12 Sep 2018 07:00  --------------------------------------------------------  IN: 860 mL / OUT: 1000 mL / NET: -140 mL    12 Sep 2018 07:01  -  12 Sep 2018 08:40  --------------------------------------------------------  IN: 0 mL / OUT: 250 mL / NET: -250 mL        PHYSICAL EXAM:  GENERAL:  [ x ] NAD , [ x ] well appearing, [  ] Agitated, [  ] Drowsy,  [  ] Lethargy, [  ] confused   HEAD:  [ x ] Normal, [  ] Other  EYES:  [ x ] EOMI, [ x ] PERRLA, [ x ] conjunctiva and sclera clear normal, [  ] Other,  [  ] Pallor, [  ] Discharge  ENMT:  [ x ] Normal, [ x ] Moist mucous membranes, [ x ] Good dentition, [x  ] No Thrush  NECK:  [ x ] Supple, [ x ] No JVD, [ x ] Normal thyroid, [  ] Lymphadenopathy [  ] Other  CHEST/LUNG:  [ x ] Clear to auscultation bilaterally, [ x ] Breath Sounds equal,  [ x ] No rales, [ x ] No rhonchi  [ x ]  No wheezing  HEART:  [x  ] Regular rate and rhythm, [  ] tachycardia, [  ] Bradycardia,  [ x ] irregular  [ x ] No murmurs, No rubs, No gallops, [  ] PPM in place (Mfr:  )  ABDOMEN:  [ x ] Soft, [ x ] Sore/mildly tender to palpation at LLQ rated 1/10, [ x ] Nondistended, [ x ] No mass, [ x ] Bowel sounds present, [  ] obese, No R/R/G  NERVOUS SYSTEM:  [ x ] Alert & Oriented X3, [ x ] Nonfocal  [  ] Confusion  [  ] Encephalopathic [  ] Sedated [  ] Unable to assess, [  ] Other-  EXTREMITIES: [ x ] 2+ Peripheral Pulses, No clubbing, No cyanosis,  [  ] edema B/L lower EXT. [  ] PVD stasis skin changes B/L Lower EXT  LYMPH: No lymphadenopathy noted  SKIN:  [ x ] No rashes or lesions, [  ] Pressure Ulcers, [ x ] R buttock hematoma, [  ] Skin Tears, [ x ] Other- L lateral thigh Bump    DIET: DASH/TLC - low residue/fiber    LABS:                        12.5   4.05  )-----------( 113      ( 12 Sep 2018 06:29 )             35.7     12 Sep 2018 06:29    143    |  107    |  10     ----------------------------<  96     3.4     |  28     |  0.95     Ca    8.6        12 Sep 2018 06:29  Mg     2.2       12 Sep 2018 06:29    TPro  7.0    /  Alb  3.2    /  TBili  1.6    /  DBili  .40    /  AST  28     /  ALT  23     /  AlkPhos  65     12 Sep 2018 06:29    LIVER FUNCTIONS - ( 12 Sep 2018 06:29 )  Alb: 3.2 g/dL / Pro: 7.0 g/dL / ALK PHOS: 65 U/L / ALT: 23 U/L / AST: 28 U/L / GGT: x           PT/INR - ( 12 Sep 2018 06:29 )   PT: 13.7 sec;   INR: 1.25 ratio    PTT - ( 12 Sep 2018 06:29 )  PTT:32.2 sec      Culture - Blood (collected 08 Sep 2018 18:53)  Source: .Blood Blood  Preliminary Report (09 Sep 2018 19:01):    No growth to date.    Culture - Blood (collected 08 Sep 2018 18:53)  Source: .Blood Blood  Preliminary Report (09 Sep 2018 19:01):    No growth to date.    Culture - Urine (collected 08 Sep 2018 16:49)  Source: .Urine Clean Catch (Midstream)  Final Report (09 Sep 2018 22:22):    <10,000 CFU/ml    Normal Urogenital bryce present        RADIOLOGY & ADDITIONAL TESTS: NONE      HEALTH ISSUES - PROBLEM Dx:  Need for prophylactic measure: Need for prophylactic measure  History of myocardial infarction: History of myocardial infarction  Atrial fibrillation: Atrial fibrillation  Diverticulitis of large intestine with perforation, unspecified bleeding status: Diverticulitis of large intestine with perforation, unspecified bleeding status        Consultant(s) Notes Reviewed:  [ x ] YES     Care Discussed with [ x ] Consultants  [ x ] Patient  [  ] Family  [  ]   [  ] Social Service  [ x ] RN, [  ] Physical Therapy  DVT PPX: [ x ] Lovenox, [  ] S C Heparin, [ x ] Coumadin, [  ] Xarelto, [  ] Eliquis, [  ] Pradaxa, [  ] IV Heparin drip, [ x ] SCD, [  ] Contraindication 2 to GI Bleed, [ x ] Ambulation  Advanced directive: [ x ] None, [  ] DNR/DNI Patient is a 68y old  Male who presents with a chief complaint of abdominal pain (09 Sep 2018 08:36)      INTERVAL HPI:  68 year old M with PMHx of A.fib on Coumadin and digoxin, MI x1 stent (July 2012, LAD) and extensive history of diverticulitis (5 episodes in the past) who presented with LLQ abdominal pain starting at 4AM today. He describes the pain as sharp and constant. Additionally he was nauseous and "retching this morning". He states he was performing his routine ADL's yesterday. He did not take any medications for the pain. He was unable to take his medications today. He last ate yesterday evening. He reports feeling constipated for the last couple of days but had BM yesterday morning with bleeding  He denies fevers, chills, chest pain, palpitations, sob. Does not remember when his last colonoscopy was performed.     In the ED, the patient's vital signs were T: 98.8, , /88, R: 16, SpO2 98% on RA. EKG: Atrial fibrillation @110bpm. CXR: No evidence of active chest disease. CT abdomen with oral contrast: Perforated diverticulitis with moderate pneumoperitoneum. No intraperitoneal abscess. WBC of 10.76. CMP significant for bili of 1.4. Dr. Oseguera evaluated patient in ED, recommended ICU admission and non-surgical management at this time. Given the possibility of urgent surgery Pt was given K centra in ER, Pt Got IV Fluid Bolus & IV Zosyn x 1 in ER.    9/9/18: Pt seen, examined in ICU, Feels a lot better today, NPO, IV Fluids, IV Abx Pt had a BM. WBC- Normal.  9/10/18: Patient seen and examined at bedside in 2E. Patient stated that he had a BM yesterday, is passing flatus, and LLQ pain is significantly improved. He reported that he fell when getting up to use the bathroom last night, attributed it to taking Ambien for trouble sleeping. Patient stated that he feels fine now, having no pain, and does not want imaging. Pt had some clear liquid diet, D/W GI DR Tobin, D/W DR Oseguera- Keep NPO for Now, Possible clear liquid in AM. Restart Coumadin when pt tolerates PO diet.  9/11/18: Patient seen and examined at bedside. Patient reported that his abdominal pain had completely resolved. Patient felt sore on his right buttock but reported he was ambulating normally. Patient reported that Melatonin helped him sleep well and he woke up feeling "refreshed, like himself." Patient also stated that he felt emotional due to today being 9/11, was counseled extensively. Full liquid diet started today by surgery.  9/12/18: Patient seen and examined at bedside, reported that he felt well, having regular BMs and had no complaints. Low residue diet started by surgery. Per surgery and cardio, starting back on Coumadin today.    OVERNIGHT EVENTS: None    Home Medications:  alfuzosin 10 mg oral tablet, extended release: 1 tab(s) orally once a day (08 Sep 2018 16:31)  atorvastatin 40 mg oral tablet: 1 tab(s) orally once a day (08 Sep 2018 16:31)  Centrum Silver oral tablet: 1 tab(s) orally once a day (08 Sep 2018 16:31)  digoxin 125 mcg (0.125 mg) oral tablet: 1 tab(s) orally once a day (08 Sep 2018 16:31)  Ecotrin Adult Low Strength 81 mg oral delayed release tablet: 1 tab(s) orally once a day (08 Sep 2018 16:31)  Metoprolol Succinate  mg oral tablet, extended release: 1 tab(s) orally once a day (08 Sep 2018 16:31)  warfarin 5 mg oral tablet: 1 tab(s) orally Monday through Saturday  (08 Sep 2018 16:31)  warfarin 7.5 mg oral tablet: orally every Sunday (08 Sep 2018 16:31)    MEDICATIONS  (STANDING):  digoxin     Tablet 0.125 milliGRAM(s) Oral daily  enoxaparin Injectable 40 milliGRAM(s) SubCutaneous daily  influenza   Vaccine 0.5 milliLiter(s) IntraMuscular once  lactobacillus acidophilus 1 Tablet(s) Oral three times a day with meals  metoprolol tartrate 25 milliGRAM(s) Oral every 6 hours  piperacillin/tazobactam IVPB. 3.375 Gram(s) IV Intermittent every 8 hours  potassium acid phosphate/sodium acid phosphate tablet (K-PHOS No. 2) 1 Tablet(s) Oral four times a day with meals  potassium chloride    Tablet ER 20 milliEquivalent(s) Oral every 2 hours  tamsulosin 0.4 milliGRAM(s) Oral at bedtime  warfarin 2.5 milliGRAM(s) Oral once    MEDICATIONS  (PRN):  melatonin 3 milliGRAM(s) Oral at bedtime PRN Insomnia    Allergies  sulfa drugs (Rash)    Intolerances  lactose (Unknown)      REVIEW OF SYSTEMS: Feels well, No complaints  CONSTITUTIONAL: No fever, No chills, No fatigue, No myalgia, No Body ache, No Weakness  EYES: No eye pain,  No visual disturbances, No discharge, No Redness  ENMT:  No ear pain, No nose bleed, No vertigo; No sinus or throat pain, No Congestion  NECK: No pain, No stiffness  RESPIRATORY: No cough, wheezing, No  hemoptysis, No shortness of breath  CARDIOVASCULAR: No chest pain, palpitations  GASTROINTESTINAL: No abdominal or epigastric pain. No nausea, No vomiting; No diarrhea or constipation. [ x ] regular BM  GENITOURINARY: No dysuria, No frequency, No urgency, No hematuria, or incontinence  NEUROLOGICAL: No headaches, No dizziness, No numbness, No tingling, No tremors, No weakness  EXT: No Swelling, No Pain, No Edema  SKIN: [ x ] No itching, burning, rashes, or lesions   MUSCULOSKELETAL: No joint pain or swelling; No muscle pain, No back pain, No extremity pain  PSYCHIATRIC: No depression, anxiety, mood swings or difficulty sleeping at night  PAIN SCALE: [  ] None  [ x ] Other-Mild soreness at right buttock (improved)  ROS Unable to obtain due to - [  ] Dementia  [  ] Lethargy  [  ] Sedated [  ] non verbal  REST OF REVIEW Of SYSTEM - [ x ] Normal     Vital Signs Last 24 Hrs  T(C): 37 (12 Sep 2018 07:51), Max: 37 (12 Sep 2018 07:51)  T(F): 98.6 (12 Sep 2018 07:51), Max: 98.6 (12 Sep 2018 07:51)  HR: 76 (12 Sep 2018 07:51) (69 - 96)  BP: 137/80 (12 Sep 2018 07:51) (127/78 - 137/80)  BP(mean): --  RR: 16 (12 Sep 2018 07:51) (16 - 17)  SpO2: 97% (12 Sep 2018 07:51) (97% - 99%)    I&O's Summary    11 Sep 2018 07:01  -  12 Sep 2018 07:00  --------------------------------------------------------  IN: 860 mL / OUT: 1000 mL / NET: -140 mL    12 Sep 2018 07:01  -  12 Sep 2018 08:40  --------------------------------------------------------  IN: 0 mL / OUT: 250 mL / NET: -250 mL        PHYSICAL EXAM:  GENERAL:  [ x ] NAD , [ x ] well appearing, [  ] Agitated, [  ] Drowsy,  [  ] Lethargy, [  ] confused   HEAD:  [ x ] Normal, [  ] Other  EYES:  [ x ] EOMI, [ x ] PERRLA, [ x ] conjunctiva and sclera clear normal, [  ] Other,  [  ] Pallor, [  ] Discharge  ENMT:  [ x ] Normal, [ x ] Moist mucous membranes, [ x ] Good dentition, [x  ] No Thrush  NECK:  [ x ] Supple, [ x ] No JVD, [ x ] Normal thyroid, [  ] Lymphadenopathy [  ] Other  CHEST/LUNG:  [ x ] Clear to auscultation bilaterally, [ x ] Breath Sounds equal,  [ x ] No rales, [ x ] No rhonchi  [ x ]  No wheezing  HEART:  [x  ] Regular rate and rhythm, [  ] tachycardia, [  ] Bradycardia,  [ x ] irregular  [ x ] No murmurs, No rubs, No gallops, [  ] PPM in place (Mfr:  )  ABDOMEN:  [ x ] Soft, [ x ] Sore/mildly tender to palpation at LLQ rated 1/10, [ x ] Nondistended, [ x ] No mass, [ x ] Bowel sounds present, [  ] obese, No R/R/G  NERVOUS SYSTEM:  [ x ] Alert & Oriented X3, [ x ] Nonfocal  [  ] Confusion  [  ] Encephalopathic [  ] Sedated [  ] Unable to assess, [  ] Other-  EXTREMITIES: [ x ] 2+ Peripheral Pulses, No clubbing, No cyanosis,  [  ] edema B/L lower EXT. [  ] PVD stasis skin changes B/L Lower EXT  LYMPH: No lymphadenopathy noted  SKIN:  [ x ] No rashes or lesions, [  ] Pressure Ulcers, [ x ] R buttock hematoma, [  ] Skin Tears, [ x ] Other- L lateral thigh Bump    DIET: DASH/TLC - low residue/fiber    LABS:                        12.5   4.05  )-----------( 113      ( 12 Sep 2018 06:29 )             35.7     12 Sep 2018 06:29    143    |  107    |  10     ----------------------------<  96     3.4     |  28     |  0.95     Ca    8.6        12 Sep 2018 06:29  Mg     2.2       12 Sep 2018 06:29    TPro  7.0    /  Alb  3.2    /  TBili  1.6    /  DBili  .40    /  AST  28     /  ALT  23     /  AlkPhos  65     12 Sep 2018 06:29    LIVER FUNCTIONS - ( 12 Sep 2018 06:29 )  Alb: 3.2 g/dL / Pro: 7.0 g/dL / ALK PHOS: 65 U/L / ALT: 23 U/L / AST: 28 U/L / GGT: x           PT/INR - ( 12 Sep 2018 06:29 )   PT: 13.7 sec;   INR: 1.25 ratio    PTT - ( 12 Sep 2018 06:29 )  PTT:32.2 sec      Culture - Blood (collected 08 Sep 2018 18:53)  Source: .Blood Blood  Preliminary Report (09 Sep 2018 19:01):    No growth to date.    Culture - Blood (collected 08 Sep 2018 18:53)  Source: .Blood Blood  Preliminary Report (09 Sep 2018 19:01):    No growth to date.    Culture - Urine (collected 08 Sep 2018 16:49)  Source: .Urine Clean Catch (Midstream)  Final Report (09 Sep 2018 22:22):    <10,000 CFU/ml    Normal Urogenital bryce present        RADIOLOGY & ADDITIONAL TESTS: NONE      HEALTH ISSUES - PROBLEM Dx:  Need for prophylactic measure: Need for prophylactic measure  History of myocardial infarction: History of myocardial infarction  Atrial fibrillation: Atrial fibrillation  Diverticulitis of large intestine with perforation, unspecified bleeding status: Diverticulitis of large intestine with perforation, unspecified bleeding status        Consultant(s) Notes Reviewed:  [ x ] YES     Care Discussed with [ x ] Consultants  [ x ] Patient  [ x ] Family-  [  ]   [  ] Social Service  [ x ] RN, [  ] Physical Therapy  DVT PPX: [ x ] Lovenox, [  ] S C Heparin, [ x ] Coumadin, [  ] Xarelto, [  ] Eliquis, [  ] Pradaxa, [  ] IV Heparin drip, [ x ] SCD, [  ] Contraindication 2 to GI Bleed, [ x ] Ambulation  Advanced directive: [ x ] None, [  ] DNR/DNI Patient is a 68y old  Male who presents with a chief complaint of abdominal pain (09 Sep 2018 08:36)      INTERVAL HPI:  68 year old M with PMHx of A.fib on Coumadin and digoxin, MI x1 stent (July 2012, LAD) and extensive history of diverticulitis (5 episodes in the past) who presented with LLQ abdominal pain starting at 4AM today. He describes the pain as sharp and constant. Additionally he was nauseous and "retching this morning". He states he was performing his routine ADL's yesterday. He did not take any medications for the pain. He was unable to take his medications today. He last ate yesterday evening. He reports feeling constipated for the last couple of days but had BM yesterday morning with bleeding  He denies fevers, chills, chest pain, palpitations, sob. Does not remember when his last colonoscopy was performed.     In the ED, the patient's vital signs were T: 98.8, , /88, R: 16, SpO2 98% on RA. EKG: Atrial fibrillation @110bpm. CXR: No evidence of active chest disease. CT abdomen with oral contrast: Perforated diverticulitis with moderate pneumoperitoneum. No intraperitoneal abscess. WBC of 10.76. CMP significant for bili of 1.4. Dr. Oseguera evaluated patient in ED, recommended ICU admission and non-surgical management at this time. Given the possibility of urgent surgery Pt was given K centra in ER, Pt Got IV Fluid Bolus & IV Zosyn x 1 in ER.    9/9/18: Pt seen, examined in ICU, Feels a lot better today, NPO, IV Fluids, IV Abx Pt had a BM. WBC- Normal.  9/10/18: Patient seen and examined at bedside in 2E. Patient stated that he had a BM yesterday, is passing flatus, and LLQ pain is significantly improved. He reported that he fell when getting up to use the bathroom last night, attributed it to taking Ambien for trouble sleeping. Patient stated that he feels fine now, having no pain, and does not want imaging. Pt had some clear liquid diet, D/W GI DR Tobin, D/W DR Oseguera- Keep NPO for Now, Possible clear liquid in AM. Restart Coumadin when pt tolerates PO diet.  9/11/18: Patient seen and examined at bedside. Patient reported that his abdominal pain had completely resolved. Patient felt sore on his right buttock but reported he was ambulating normally. Patient reported that Melatonin helped him sleep well and he woke up feeling "refreshed, like himself." Patient also stated that he felt emotional due to today being 9/11, was counseled extensively. Full liquid diet started today by surgery.  9/12/18: Patient seen and examined at bedside, reported that he felt well, having regular BMs and had no complaints. Low residue diet started by surgery. Per surgery and cardio, starting back on Coumadin today.    OVERNIGHT EVENTS: None    Home Medications:  alfuzosin 10 mg oral tablet, extended release: 1 tab(s) orally once a day (08 Sep 2018 16:31)  atorvastatin 40 mg oral tablet: 1 tab(s) orally once a day (08 Sep 2018 16:31)  Centrum Silver oral tablet: 1 tab(s) orally once a day (08 Sep 2018 16:31)  digoxin 125 mcg (0.125 mg) oral tablet: 1 tab(s) orally once a day (08 Sep 2018 16:31)  Ecotrin Adult Low Strength 81 mg oral delayed release tablet: 1 tab(s) orally once a day (08 Sep 2018 16:31)  Metoprolol Succinate  mg oral tablet, extended release: 1 tab(s) orally once a day (08 Sep 2018 16:31)  warfarin 5 mg oral tablet: 1 tab(s) orally Monday through Saturday  (08 Sep 2018 16:31)  warfarin 7.5 mg oral tablet: orally every Sunday (08 Sep 2018 16:31)    MEDICATIONS  (STANDING):  digoxin     Tablet 0.125 milliGRAM(s) Oral daily  enoxaparin Injectable 40 milliGRAM(s) SubCutaneous daily  influenza   Vaccine 0.5 milliLiter(s) IntraMuscular once  lactobacillus acidophilus 1 Tablet(s) Oral three times a day with meals  metoprolol tartrate 25 milliGRAM(s) Oral every 6 hours  piperacillin/tazobactam IVPB. 3.375 Gram(s) IV Intermittent every 8 hours  potassium acid phosphate/sodium acid phosphate tablet (K-PHOS No. 2) 1 Tablet(s) Oral four times a day with meals  potassium chloride    Tablet ER 20 milliEquivalent(s) Oral every 2 hours  tamsulosin 0.4 milliGRAM(s) Oral at bedtime  warfarin 2.5 milliGRAM(s) Oral once    MEDICATIONS  (PRN):  melatonin 3 milliGRAM(s) Oral at bedtime PRN Insomnia    Allergies  sulfa drugs (Rash)    Intolerances  lactose (Unknown)      REVIEW OF SYSTEMS: Feels well, No complaints  CONSTITUTIONAL: No fever, No chills, No fatigue, No myalgia, No Body ache, No Weakness  EYES: No eye pain,  No visual disturbances, No discharge, No Redness  ENMT:  No ear pain, No nose bleed, No vertigo; No sinus or throat pain, No Congestion  NECK: No pain, No stiffness  RESPIRATORY: No cough, wheezing, No  hemoptysis, No shortness of breath  CARDIOVASCULAR: No chest pain, palpitations  GASTROINTESTINAL: No abdominal or epigastric pain. No nausea, No vomiting; No diarrhea or constipation. [ x ] regular BM  GENITOURINARY: No dysuria, No frequency, No urgency, No hematuria, or incontinence  NEUROLOGICAL: No headaches, No dizziness, No numbness, No tingling, No tremors, No weakness  EXT: No Swelling, No Pain, No Edema  SKIN: [ x ] No itching, burning, rashes, or lesions   MUSCULOSKELETAL: No joint pain or swelling; No muscle pain, No back pain, No extremity pain  PSYCHIATRIC: No depression, anxiety, mood swings or difficulty sleeping at night  PAIN SCALE: [  ] None  [ x ] Other-Mild soreness at right buttock (improved)  ROS Unable to obtain due to - [  ] Dementia  [  ] Lethargy  [  ] Sedated [  ] non verbal  REST OF REVIEW Of SYSTEM - [ x ] Normal     Vital Signs Last 24 Hrs  T(C): 37 (12 Sep 2018 07:51), Max: 37 (12 Sep 2018 07:51)  T(F): 98.6 (12 Sep 2018 07:51), Max: 98.6 (12 Sep 2018 07:51)  HR: 76 (12 Sep 2018 07:51) (69 - 96)  BP: 137/80 (12 Sep 2018 07:51) (127/78 - 137/80)  BP(mean): --  RR: 16 (12 Sep 2018 07:51) (16 - 17)  SpO2: 97% (12 Sep 2018 07:51) (97% - 99%)    I&O's Summary    11 Sep 2018 07:01  -  12 Sep 2018 07:00  --------------------------------------------------------  IN: 860 mL / OUT: 1000 mL / NET: -140 mL    12 Sep 2018 07:01  -  12 Sep 2018 08:40  --------------------------------------------------------  IN: 0 mL / OUT: 250 mL / NET: -250 mL        PHYSICAL EXAM:  GENERAL:  [ x ] NAD , [ x ] well appearing, [  ] Agitated, [  ] Drowsy,  [  ] Lethargy, [  ] confused   HEAD:  [ x ] Normal, [  ] Other  EYES:  [ x ] EOMI, [ x ] PERRLA, [ x ] conjunctiva and sclera clear normal, [  ] Other,  [  ] Pallor, [  ] Discharge  ENMT:  [ x ] Normal, [ x ] Moist mucous membranes, [ x ] Good dentition, [x  ] No Thrush  NECK:  [ x ] Supple, [ x ] No JVD, [ x ] Normal thyroid, [  ] Lymphadenopathy [  ] Other  CHEST/LUNG:  [ x ] Clear to auscultation bilaterally, [ x ] Breath Sounds equal,  [ x ] No rales, [ x ] No rhonchi  [ x ]  No wheezing  HEART:  [x  ] Regular rate and rhythm, [  ] tachycardia, [  ] Bradycardia,  [ x ] irregular  [ x ] No murmurs, No rubs, No gallops, [  ] PPM in place (Mfr:  )  ABDOMEN:  [ x ] Soft, [ x ] Sore/mildly tender to palpation at LLQ rated 1/10, [ x ] Nondistended, [ x ] No mass, [ x ] Bowel sounds present, [  ] obese, No R/R/G  NERVOUS SYSTEM:  [ x ] Alert & Oriented X3, [ x ] Nonfocal  [  ] Confusion  [  ] Encephalopathic [  ] Sedated [  ] Unable to assess, [  ] Other-  EXTREMITIES: [ x ] 2+ Peripheral Pulses, No clubbing, No cyanosis,  [  ] edema B/L lower EXT. [  ] PVD stasis skin changes B/L Lower EXT  LYMPH: No lymphadenopathy noted  SKIN:  [ x ] No rashes or lesions, [  ] Pressure Ulcers, [ x ] R buttock hematoma, [  ] Skin Tears, [ x ] Other- L lateral thigh Bump resolved    DIET: DASH/TLC - low residue/fiber    LABS:                        12.5   4.05  )-----------( 113      ( 12 Sep 2018 06:29 )             35.7     12 Sep 2018 06:29    143    |  107    |  10     ----------------------------<  96     3.4     |  28     |  0.95     Ca    8.6        12 Sep 2018 06:29  Mg     2.2       12 Sep 2018 06:29    TPro  7.0    /  Alb  3.2    /  TBili  1.6    /  DBili  .40    /  AST  28     /  ALT  23     /  AlkPhos  65     12 Sep 2018 06:29    LIVER FUNCTIONS - ( 12 Sep 2018 06:29 )  Alb: 3.2 g/dL / Pro: 7.0 g/dL / ALK PHOS: 65 U/L / ALT: 23 U/L / AST: 28 U/L / GGT: x           PT/INR - ( 12 Sep 2018 06:29 )   PT: 13.7 sec;   INR: 1.25 ratio    PTT - ( 12 Sep 2018 06:29 )  PTT:32.2 sec      Culture - Blood (collected 08 Sep 2018 18:53)  Source: .Blood Blood  Preliminary Report (09 Sep 2018 19:01):    No growth to date.    Culture - Blood (collected 08 Sep 2018 18:53)  Source: .Blood Blood  Preliminary Report (09 Sep 2018 19:01):    No growth to date.    Culture - Urine (collected 08 Sep 2018 16:49)  Source: .Urine Clean Catch (Midstream)  Final Report (09 Sep 2018 22:22):    <10,000 CFU/ml    Normal Urogenital bryce present        RADIOLOGY & ADDITIONAL TESTS: NONE      HEALTH ISSUES - PROBLEM Dx:  Need for prophylactic measure: Need for prophylactic measure  History of myocardial infarction: History of myocardial infarction  Atrial fibrillation: Atrial fibrillation  Diverticulitis of large intestine with perforation, unspecified bleeding status: Diverticulitis of large intestine with perforation, unspecified bleeding status        Consultant(s) Notes Reviewed:  [ x ] YES     Care Discussed with [ x ] Consultants  [ x ] Patient  [ x ] Family-  [  ]   [  ] Social Service  [ x ] RN, [  ] Physical Therapy  DVT PPX: [ x ] Lovenox, [  ] S C Heparin, [ x ] Coumadin, [  ] Xarelto, [  ] Eliquis, [  ] Pradaxa, [  ] IV Heparin drip, [ x ] SCD, [  ] Contraindication 2 to GI Bleed, [ x ] Ambulation  Advanced directive: [ x ] None, [  ] DNR/DNI

## 2018-09-12 NOTE — PROGRESS NOTE ADULT - SUBJECTIVE AND OBJECTIVE BOX
[facility], Division of Infectious Diseases  TITI Forman A. Lee  [phone]    Name: VAISHNAVI REED  Age: 68y  Gender: Male  MRN: [id number]    Interval History--  Notes reviewed. Feeling ok. Tolerating PO without problems. No pain. No fevers, chills, or rigors.     Past Medical History--  Dyslipidemia  Essential hypertension  CAD (coronary artery disease)  Atrial fibrillation  MI (myocardial infarction)  Diverticulitis  Dislocation of right shoulder joint, sequela  S/P tonsillectomy  S/P appendectomy      For details regarding the patient's social history, family history, and other miscellaneous elements, please refer the initial infectious diseases consultation and/or the admitting history and physical examination for this admission.    Allergies    sulfa drugs (Rash)    Intolerances    lactose (Unknown)      Medications--  Antibiotics:  piperacillin/tazobactam IVPB. 3.375 Gram(s) IV Intermittent every 8 hours    Immunologic:  influenza   Vaccine 0.5 milliLiter(s) IntraMuscular once    Other:  aspirin  chewable  atorvastatin  digoxin     Tablet  enoxaparin Injectable  lactobacillus acidophilus  melatonin PRN  metoprolol tartrate  multivitamin  potassium acid phosphate/sodium acid phosphate tablet (K-PHOS No. 2)  tamsulosin  warfarin      Review of Systems--  A 10-point review of systems was obtained.   Review of systems otherwise negative except as previously noted.    Physical Examination--  Vital Signs: T(F): 98.6 (09-12-18 @ 07:51), Max: 98.6 (09-12-18 @ 07:51)  HR: 76 (09-12-18 @ 07:51)  BP: 137/80 (09-12-18 @ 07:51)  RR: 16 (09-12-18 @ 07:51)  SpO2: 97% (09-12-18 @ 07:51)  Wt(kg): --  General: Nontoxic-appearing Male in no acute distress.  HEENT: AT/NC. Anicteric. Conjunctiva pink and moist. Oropharynx clear.   Neck: Not rigid. No sense of mass.  Nodes: None palpable.  Lungs: Clear bilaterally without rales, wheezing or rhonchi  Heart: Regular rate and rhythm. No Murmur. No rub. No gallop. No palpable thrill.  Abdomen: Bowel sounds present and normoactive. Soft. Nondistended. Nontender. No sense of mass. No organomegaly.  Extremities: No cyanosis or clubbing. No edema.   Skin: Warm. Dry. Good turgor. No rash. No vasculitic stigmata.  Psychiatric: Appropriate affect and mood for situation.         Laboratory Studies--  CBC                        12.5   4.05  )-----------( 113      ( 12 Sep 2018 06:29 )             35.7       Chemistries  09-12    143  |  107  |  10  ----------------------------<  96  3.4<L>   |  28  |  0.95    Ca    8.6      12 Sep 2018 06:29  Mg     2.2     09-12    TPro  7.0  /  Alb  3.2<L>  /  TBili  1.6<H>  /  DBili  .40<H>  /  AST  28  /  ALT  23  /  AlkPhos  65  09-12      Culture Data    Culture - Blood (collected 08 Sep 2018 18:53)  Source: .Blood Blood  Preliminary Report (09 Sep 2018 19:01):    No growth to date.    Culture - Blood (collected 08 Sep 2018 18:53)  Source: .Blood Blood  Preliminary Report (09 Sep 2018 19:01):    No growth to date.    Culture - Urine (collected 08 Sep 2018 16:49)  Source: .Urine Clean Catch (Midstream)  Final Report (09 Sep 2018 22:22):    <10,000 CFU/ml    Normal Urogenital bryce present

## 2018-09-12 NOTE — PROGRESS NOTE ADULT - PROBLEM SELECTOR PLAN 2
labs noted  CT showed normal gb and bile ducts  labs improved, predominance in indirect bili, suspect component of gilberts  will follow

## 2018-09-12 NOTE — PROGRESS NOTE ADULT - PROBLEM SELECTOR PLAN 2
Atrial fibrillation, HR stable  - Continue PO Metoprolol 25mg q6h, PO Digoxin 0.125mg daily.  - D/W cardio Dr Ireland, resuming Coumadin 2.5mg today with no need to bridge.  - Follow up PT/INR in AM.

## 2018-09-12 NOTE — PROGRESS NOTE ADULT - PROBLEM SELECTOR PLAN 5
- Per surgery and cardio, resuming anticoagulation with Coumadin today.  - Melatonin 2mg PO PRN at bedtime for insomnia.  - Bacid TID.    IMPROVE VTE Individual Risk Assessment        RISK                                                          Points  [  ] Previous VTE                                                3  [  ] Thrombophilia                                             2  [  ] Lower limb paralysis                                   2        (unable to hold up >15 seconds)    [  ] Current Cancer                                             2         (within 6 months)  [  ] Immobilization > 24 hrs                              1  [  ] ICU/CCU stay > 24 hours                             1  [ x ] Age > 60                                                         1    IMPROVE VTE Score: 1

## 2018-09-12 NOTE — PROGRESS NOTE ADULT - PROBLEM SELECTOR PLAN 1
CT showed perforated diverticulitis with moderate pneumoperitoneum, no intraperitoneal abscess  clinically improving  diet advanced per surgery, monitor tolerance  f/u surgery recs  cont abx  cont bacid  bld cxs ngtd  monitor abd exam  pain control  will need outpatient colonoscopy in 6-8 wks, dw pt and agreeable

## 2018-09-13 VITALS
SYSTOLIC BLOOD PRESSURE: 123 MMHG | TEMPERATURE: 98 F | RESPIRATION RATE: 17 BRPM | OXYGEN SATURATION: 97 % | HEART RATE: 77 BPM | DIASTOLIC BLOOD PRESSURE: 85 MMHG

## 2018-09-13 DIAGNOSIS — N40.0 BENIGN PROSTATIC HYPERPLASIA WITHOUT LOWER URINARY TRACT SYMPTOMS: ICD-10-CM

## 2018-09-13 LAB
ALBUMIN SERPL ELPH-MCNC: 3.2 G/DL — LOW (ref 3.3–5)
ALP SERPL-CCNC: 68 U/L — SIGNIFICANT CHANGE UP (ref 40–120)
ALT FLD-CCNC: 38 U/L — SIGNIFICANT CHANGE UP (ref 12–78)
ANION GAP SERPL CALC-SCNC: 5 MMOL/L — SIGNIFICANT CHANGE UP (ref 5–17)
APTT BLD: 30.4 SEC — SIGNIFICANT CHANGE UP (ref 27.5–37.4)
AST SERPL-CCNC: 37 U/L — SIGNIFICANT CHANGE UP (ref 15–37)
BILIRUB SERPL-MCNC: 1.3 MG/DL — HIGH (ref 0.2–1.2)
BUN SERPL-MCNC: 13 MG/DL — SIGNIFICANT CHANGE UP (ref 7–23)
CALCIUM SERPL-MCNC: 9 MG/DL — SIGNIFICANT CHANGE UP (ref 8.5–10.1)
CHLORIDE SERPL-SCNC: 109 MMOL/L — HIGH (ref 96–108)
CO2 SERPL-SCNC: 29 MMOL/L — SIGNIFICANT CHANGE UP (ref 22–31)
CREAT SERPL-MCNC: 0.85 MG/DL — SIGNIFICANT CHANGE UP (ref 0.5–1.3)
CULTURE RESULTS: SIGNIFICANT CHANGE UP
CULTURE RESULTS: SIGNIFICANT CHANGE UP
GLUCOSE SERPL-MCNC: 98 MG/DL — SIGNIFICANT CHANGE UP (ref 70–99)
HCT VFR BLD CALC: 36.9 % — LOW (ref 39–50)
HGB BLD-MCNC: 12.8 G/DL — LOW (ref 13–17)
INR BLD: 1.26 RATIO — HIGH (ref 0.88–1.16)
MCHC RBC-ENTMCNC: 30.4 PG — SIGNIFICANT CHANGE UP (ref 27–34)
MCHC RBC-ENTMCNC: 34.7 GM/DL — SIGNIFICANT CHANGE UP (ref 32–36)
MCV RBC AUTO: 87.6 FL — SIGNIFICANT CHANGE UP (ref 80–100)
NRBC # BLD: 0 /100 WBCS — SIGNIFICANT CHANGE UP (ref 0–0)
PLATELET # BLD AUTO: 149 K/UL — LOW (ref 150–400)
POTASSIUM SERPL-MCNC: 3.8 MMOL/L — SIGNIFICANT CHANGE UP (ref 3.5–5.3)
POTASSIUM SERPL-SCNC: 3.8 MMOL/L — SIGNIFICANT CHANGE UP (ref 3.5–5.3)
PROT SERPL-MCNC: 7.2 G/DL — SIGNIFICANT CHANGE UP (ref 6–8.3)
PROTHROM AB SERPL-ACNC: 13.8 SEC — HIGH (ref 9.8–12.7)
RBC # BLD: 4.21 M/UL — SIGNIFICANT CHANGE UP (ref 4.2–5.8)
RBC # FLD: 13.2 % — SIGNIFICANT CHANGE UP (ref 10.3–14.5)
SODIUM SERPL-SCNC: 143 MMOL/L — SIGNIFICANT CHANGE UP (ref 135–145)
SPECIMEN SOURCE: SIGNIFICANT CHANGE UP
SPECIMEN SOURCE: SIGNIFICANT CHANGE UP
WBC # BLD: 4.48 K/UL — SIGNIFICANT CHANGE UP (ref 3.8–10.5)
WBC # FLD AUTO: 4.48 K/UL — SIGNIFICANT CHANGE UP (ref 3.8–10.5)

## 2018-09-13 PROCEDURE — 99232 SBSQ HOSP IP/OBS MODERATE 35: CPT

## 2018-09-13 PROCEDURE — 99233 SBSQ HOSP IP/OBS HIGH 50: CPT

## 2018-09-13 RX ORDER — WARFARIN SODIUM 2.5 MG/1
2.5 TABLET ORAL ONCE
Qty: 0 | Refills: 0 | Status: DISCONTINUED | OUTPATIENT
Start: 2018-09-13 | End: 2018-09-13

## 2018-09-13 RX ORDER — LACTOBACILLUS ACIDOPHILUS 100MM CELL
1 CAPSULE ORAL
Qty: 42 | Refills: 0
Start: 2018-09-13 | End: 2018-10-03

## 2018-09-13 RX ORDER — DOCUSATE SODIUM 100 MG
1 CAPSULE ORAL
Qty: 0 | Refills: 0 | DISCHARGE
Start: 2018-09-13

## 2018-09-13 RX ORDER — WARFARIN SODIUM 2.5 MG/1
1 TABLET ORAL
Qty: 0 | Refills: 0 | COMMUNITY

## 2018-09-13 RX ADMIN — Medication 100 MILLIGRAM(S): at 14:34

## 2018-09-13 RX ADMIN — Medication 25 MILLIGRAM(S): at 01:46

## 2018-09-13 RX ADMIN — Medication 100 MILLIGRAM(S): at 06:03

## 2018-09-13 RX ADMIN — Medication 81 MILLIGRAM(S): at 12:31

## 2018-09-13 RX ADMIN — Medication 1 TABLET(S): at 12:31

## 2018-09-13 RX ADMIN — INFLUENZA VIRUS VACCINE 0.5 MILLILITER(S): 15; 15; 15; 15 SUSPENSION INTRAMUSCULAR at 15:39

## 2018-09-13 RX ADMIN — ENOXAPARIN SODIUM 40 MILLIGRAM(S): 100 INJECTION SUBCUTANEOUS at 12:31

## 2018-09-13 RX ADMIN — Medication 1 TABLET(S): at 08:20

## 2018-09-13 RX ADMIN — Medication 0.12 MILLIGRAM(S): at 06:03

## 2018-09-13 RX ADMIN — Medication 1 TABLET(S): at 08:19

## 2018-09-13 RX ADMIN — Medication 200 MILLIGRAM(S): at 06:02

## 2018-09-13 RX ADMIN — Medication 1 TABLET(S): at 14:34

## 2018-09-13 RX ADMIN — PIPERACILLIN AND TAZOBACTAM 25 GRAM(S): 4; .5 INJECTION, POWDER, LYOPHILIZED, FOR SOLUTION INTRAVENOUS at 06:02

## 2018-09-13 NOTE — PROGRESS NOTE ADULT - REASON FOR ADMISSION
abdominal pain

## 2018-09-13 NOTE — PROGRESS NOTE ADULT - PROBLEM SELECTOR PLAN 6
- Per surgery and cardio, resumed anticoagulation with Coumadin 9/12.  - Melatonin 2mg PO PRN at bedtime for insomnia.  - Bacid TID.    IMPROVE VTE Individual Risk Assessment        RISK                                                          Points  [  ] Previous VTE                                                3  [  ] Thrombophilia                                             2  [  ] Lower limb paralysis                                   2        (unable to hold up >15 seconds)    [  ] Current Cancer                                             2         (within 6 months)  [  ] Immobilization > 24 hrs                              1  [  ] ICU/CCU stay > 24 hours                             1  [ x ] Age > 60                                                         1    IMPROVE VTE Score: 1

## 2018-09-13 NOTE — PROGRESS NOTE ADULT - PROBLEM SELECTOR PROBLEM 3
History of myocardial infarction

## 2018-09-13 NOTE — PROGRESS NOTE ADULT - PROBLEM SELECTOR PLAN 1
Stable. Tolerating PO diet.  - Continue IV Zosyn 3.375mg q8h. Per ID, discharge on PO Augmentin 875mg q12h for 7 day course of Abx total.  - GI Dr Tobin on case.  - D/W surgery Dr Oseguera, non-surgical management, perforation clinically sealed off. Started low residue/fiber diet today.  - Plan for colonoscopy in 8 weeks as outpt & schedule surgery as outpt. Stable. Tolerating PO diet.  - Continue IV Zosyn 3.375mg q8h. Per ID- D/W Dr Chapin, discharge on PO Augmentin 875mg q12h for 7 day course of Abx total.  - GI Dr Tobin -stable for d/c home.out pt colonoscopy.  - D/W surgery Dr Oseguera, non-surgical management, perforation clinically sealed off. Started low residue/fiber diet today.  - Plan for colonoscopy in 8 weeks as outpt & schedule surgery as outpt.

## 2018-09-13 NOTE — PROGRESS NOTE ADULT - PROBLEM SELECTOR PLAN 5
- Per surgery and cardio, resumed anticoagulation with Coumadin 9/12.  - Melatonin 2mg PO PRN at bedtime for insomnia.  - Bacid TID.    IMPROVE VTE Individual Risk Assessment        RISK                                                          Points  [  ] Previous VTE                                                3  [  ] Thrombophilia                                             2  [  ] Lower limb paralysis                                   2        (unable to hold up >15 seconds)    [  ] Current Cancer                                             2         (within 6 months)  [  ] Immobilization > 24 hrs                              1  [  ] ICU/CCU stay > 24 hours                             1  [ x ] Age > 60                                                         1    IMPROVE VTE Score: 1 on Flomax 0.4 mg q HS.  resume home meds on D/C

## 2018-09-13 NOTE — PROGRESS NOTE ADULT - ASSESSMENT
68 year old M with PMHx of chronic af on dig/metoprolol, anticoagulated with warfarin.  He had an [place]-territory MI in 2012, s/p pci.  No ischemic testing since then he believes.  He reports a normal ef, and he denies any sxs of angina, heart failure or hemodynamically compromising arrhythmia.  He has an extensive history of diverticulitis (5 episodes in the past) who presented with LLQ abdominal pain starting at 4AM today. He describes the pain as sharp and constant. Additionally he was nauseous and "retching this morning". He states he was   Diverticulitis of large intestine with perforation, unspecified bleeding status.    - Thus far need for surgery is not felt to be necessary, and he is clinically improving  - There is no evidence of acute ischemia, no evidence of significant arrhythmia, presumed to have a normal EF from his description  - cont asa  - cont bb, now that tolerating po medications  - cont statin  - Restart AC coumadin for chronic afib as per surgery recommendations goal inr 2-3  - monitor electrolytes, keep k>4, Mg>2   - will follow  -For discharge today as per patient and primary team

## 2018-09-13 NOTE — PROGRESS NOTE ADULT - SUBJECTIVE AND OBJECTIVE BOX
VAISHNAVI REED  MRN-[id number] 68y    GENERAL SURGERY/ DR. ORTA    NO FEVER, CHILLS, N/V  TOLERATING REGULAR DIET   + FLATUS AND BM     Vital Signs Last 24 Hrs  T(C): 37 (13 Sep 2018 05:12), Max: 37 (12 Sep 2018 07:51)  T(F): 98.6 (13 Sep 2018 05:12), Max: 98.6 (12 Sep 2018 07:51)  HR: 82 (13 Sep 2018 05:12) (76 - 86)  BP: 135/91 (13 Sep 2018 05:12) (128/84 - 137/87)  BP(mean): --  RR: 17 (13 Sep 2018 05:12) (16 - 17)  SpO2: 97% (13 Sep 2018 05:12) (97% - 99%)        LUNGS: CLEAR TO AUSCULTATION , NO W/R/R  ABDOMEN: + BS, SOFT, NON DISTENDED, NON TENDER  EXTREMITY: NO EDEMA, NO CALF TENDERNESS                              12.5   4.05  )-----------( 113      ( 12 Sep 2018 06:29 )             35.7      09-12    143  |  107  |  10  ----------------------------<  96  3.4<L>   |  28  |  0.95    Ca    8.6      12 Sep 2018 06:29  Mg     2.2     09-12    TPro  7.0  /  Alb  3.2<L>  /  TBili  1.6<H>  /  DBili  .40<H>  /  AST  28  /  ALT  23  /  AlkPhos  65  09-12    PT/INR - ( 12 Sep 2018 06:29 )   PT: 13.7 sec;   INR: 1.25 ratio     PTT - ( 12 Sep 2018 06:29 )  PTT:32.2 sec    ASSESSMENT &  PLAN: PERFORATED DIVERTICULITIS                             CLINICALLY STABLE  CONTINUE ZOSYN, PO AUGMENTIN 875 MG BID FOR HOME AS NOTED BY ID CONSULT  LOW RESIDUE DIET   ANTICOAGULATION SI RESUMED  CLEAR FROM SURGICAL STAND POINT FOR DISCHARGE HOME   FOLLOW  UP  WITH DR. ORTA OFFICE IN 2 WEEKS

## 2018-09-13 NOTE — PROGRESS NOTE ADULT - SUBJECTIVE AND OBJECTIVE BOX
CHIEF COMPLAINT: Patient is a 68y old  Male who presents with a chief complaint of abdominal pain (13 Sep 2018 08:15)      HPI:  68 year old M with PMHx of Ju on Coumadin and digoxin, MI x1 stent (July 2012, LAD) and extensive history of diverticulitis (5 episodes in the past) who presented with LLQ abdominal pain starting at 4AM today. He describes the pain as sharp and constant. Additionally he was nauseous and "retching this morning". He states he was performing his routine ADL's yesterday. He did not take any medications for the pain. He was unable to take his medications today. He last ate yesterday evening. He reports feeling constipated for the last couple of days but had BM yesterday morning with bleeding  He denies fevers, chills, chest pain, palpitations, sob. Does not remember when his last colonoscopy was performed.     In the ED, the patient's vital signs were T: 98.8, , /88, R: 16, SpO2 98% on RA. EKG: Atrial fibrillation @110bpm. CXR: No evidence of active chest disease. CT abdomen with oral contrast: Perforated diverticulitis with moderate pneumoperitoneum. No intraperitoneal abscess. WBC of 10.76. CMP significant for bili of 1.4. Dr. Oseguera evaluated patient in ED, recommended ICU admission and non-surgical management at this time. Given the possibility of urgent surgery Pt was given K centra in ER, Pt Got IV Fluid Bolus & IV Zosyn x 1 in ER. (08 Sep 2018 15:05)      Follow Up:    Interval History:  EKG:    REVIEW OF SYSTEMS:   All other review of systems are negative unless indicated above    PAST MEDICAL & SURGICAL HISTORY:  Dyslipidemia  Essential hypertension  CAD (coronary artery disease): Stent 2012 [facility]  Atrial fibrillation  MI (myocardial infarction)  Diverticulitis  Dislocation of right shoulder joint, sequela: s/p surgery yrs ago  S/P tonsillectomy  S/P appendectomy      SOCIAL HISTORY:  No tobacco, ethanol, or drug abuse.    FAMILY HISTORY:  Family history of prostate cancer (Uncle)  Family history of myocardial infarction (Father)    No family history of acute MI or sudden cardiac death.    MEDICATIONS  (STANDING):  aspirin  chewable 81 milliGRAM(s) Oral daily  atorvastatin 40 milliGRAM(s) Oral at bedtime  digoxin     Tablet 0.125 milliGRAM(s) Oral daily  docusate sodium 100 milliGRAM(s) Oral three times a day  enoxaparin Injectable 40 milliGRAM(s) SubCutaneous daily  influenza   Vaccine 0.5 milliLiter(s) IntraMuscular once  lactobacillus acidophilus 1 Tablet(s) Oral three times a day with meals  metoprolol succinate  milliGRAM(s) Oral daily  multivitamin 1 Tablet(s) Oral daily  piperacillin/tazobactam IVPB. 3.375 Gram(s) IV Intermittent every 8 hours  potassium acid phosphate/sodium acid phosphate tablet (K-PHOS No. 2) 1 Tablet(s) Oral four times a day with meals  tamsulosin 0.4 milliGRAM(s) Oral at bedtime    MEDICATIONS  (PRN):  melatonin 3 milliGRAM(s) Oral at bedtime PRN Insomnia      Allergies    sulfa drugs (Rash)    Intolerances    lactose (Unknown)      Home meds:  Home Medications:  alfuzosin 10 mg oral tablet, extended release: 1 tab(s) orally once a day (08 Sep 2018 16:31)  atorvastatin 40 mg oral tablet: 1 tab(s) orally once a day (08 Sep 2018 16:31)  Centrum Silver oral tablet: 1 tab(s) orally once a day (08 Sep 2018 16:31)  digoxin 125 mcg (0.125 mg) oral tablet: 1 tab(s) orally once a day (08 Sep 2018 16:31)  Ecotrin Adult Low Strength 81 mg oral delayed release tablet: 1 tab(s) orally once a day (08 Sep 2018 16:31)  Metoprolol Succinate  mg oral tablet, extended release: 1 tab(s) orally once a day (08 Sep 2018 16:31)  warfarin 5 mg oral tablet: 1 tab(s) orally Monday through Saturday  (08 Sep 2018 16:31)  warfarin 7.5 mg oral tablet: orally every Sunday (08 Sep 2018 16:31)        VITAL SIGNS:   Vital Signs Last 24 Hrs  T(C): 36.8 (13 Sep 2018 07:55), Max: 37 (12 Sep 2018 16:26)  T(F): 98.3 (13 Sep 2018 07:55), Max: 98.6 (12 Sep 2018 16:26)  HR: 77 (13 Sep 2018 07:55) (77 - 86)  BP: 123/85 (13 Sep 2018 07:55) (123/85 - 137/87)  BP(mean): --  RR: 17 (13 Sep 2018 07:55) (16 - 17)  SpO2: 97% (13 Sep 2018 07:55) (97% - 99%)    I&O's Summary    12 Sep 2018 07:01  -  13 Sep 2018 07:00  --------------------------------------------------------  IN: 700 mL / OUT: 1900 mL / NET: -1200 mL        On Exam:  TELE:   Constitutional: NAD, awake and alert, well-developed  HEENT: Moist Mucous Membranes, Anicteric  Pulmonary: Non-labored, breath sounds are clear bilaterally, No wheezing, rales or rhonchi  Cardiovascular: Regular, S1 and S2, No murmurs, rubs, gallops or clicks  Gastrointestinal: Bowel Sounds present, soft, nontender.   Lymph: No peripheral edema. No lymphadenopathy.  Skin: No visible rashes or ulcers.  Psych:  Mood & affect appropriate    LABS: All Labs Reviewed:                        12.8   4.48  )-----------( 149      ( 13 Sep 2018 07:34 )             36.9                         12.5   4.05  )-----------( 113      ( 12 Sep 2018 06:29 )             35.7                         11.8   4.95  )-----------( 107      ( 11 Sep 2018 06:30 )             33.8     13 Sep 2018 07:34    143    |  109    |  13     ----------------------------<  98     3.8     |  29     |  0.85   12 Sep 2018 06:29    143    |  107    |  10     ----------------------------<  96     3.4     |  28     |  0.95   11 Sep 2018 06:30    144    |  108    |  9      ----------------------------<  76     3.3     |  27     |  0.87     Ca    9.0        13 Sep 2018 07:34  Ca    8.6        12 Sep 2018 06:29  Ca    8.4        11 Sep 2018 06:30  Mg     2.2       12 Sep 2018 06:29    TPro  7.2    /  Alb  3.2    /  TBili  1.3    /  DBili  x      /  AST  37     /  ALT  38     /  AlkPhos  68     13 Sep 2018 07:34  TPro  7.0    /  Alb  3.2    /  TBili  1.6    /  DBili  .40    /  AST  28     /  ALT  23     /  AlkPhos  65     12 Sep 2018 06:29  TPro  6.8    /  Alb  3.0    /  TBili  1.9    /  DBili  x      /  AST  32     /  ALT  24     /  AlkPhos  61     11 Sep 2018 06:30    PT/INR - ( 13 Sep 2018 07:34 )   PT: 13.8 sec;   INR: 1.26 ratio         PTT - ( 13 Sep 2018 07:34 )  PTT:30.4 sec      Blood Culture: Organism --  Gram Stain Blood -- Gram Stain --  Specimen Source .Blood Blood  Culture-Blood --    Organism --  Gram Stain Blood -- Gram Stain --  Specimen Source .Urine Clean Catch (Midstream)  Culture-Blood --            RADIOLOGY: CHIEF COMPLAINT: Patient is a 68y old  Male who presents with a chief complaint of abdominal pain (13 Sep 2018 08:15)      HPI:  68 year old M with PMHx of Ju on Coumadin and digoxin, MI x1 stent (July 2012, LAD) and extensive history of diverticulitis (5 episodes in the past) who presented with LLQ abdominal pain starting at 4AM today. He describes the pain as sharp and constant. Additionally he was nauseous and "retching this morning". He states he was performing his routine ADL's yesterday. He did not take any medications for the pain. He was unable to take his medications today. He last ate yesterday evening. He reports feeling constipated for the last couple of days but had BM yesterday morning with bleeding  He denies fevers, chills, chest pain, palpitations, sob. Does not remember when his last colonoscopy was performed.     In the ED, the patient's vital signs were T: 98.8, , /88, R: 16, SpO2 98% on RA. EKG: Atrial fibrillation @110bpm. CXR: No evidence of active chest disease. CT abdomen with oral contrast: Perforated diverticulitis with moderate pneumoperitoneum. No intraperitoneal abscess. WBC of 10.76. CMP significant for bili of 1.4. Dr. Oseguera evaluated patient in ED, recommended ICU admission and non-surgical management at this time. Given the possibility of urgent surgery Pt was given K centra in ER, Pt Got IV Fluid Bolus & IV Zosyn x 1 in ER. (08 Sep 2018 15:05)      Follow Up: AFIB    Interval History: Patient seen and examined and is out of bed in chair resting comfortably with NAD, with no respiratory distress, no chest pain, dyspnea, palpitations, PND, or orthopnea.    EKG:  < from: 12 Lead ECG (09.08.18 @ 11:53) >  Ventricular Rate 110 BPM    Atrial Rate 107 BPM    QRS Duration 76 ms    Q-T Interval 272 ms    QTC Calculation(Bezet) 368 ms    R Axis 4 degrees    T Axis 130 degrees    Diagnosis Line Atrial fibrillation with rapid ventricular response  ST & T wave abnormality, consider lateral ischemia or digitalis effect  Abnormal ECG      REVIEW OF SYSTEMS:    CONSTITUTIONAL: No weakness, fevers or chills  EYES/ENT: No visual changes;  No vertigo or throat pain   NECK: No pain or stiffness  RESPIRATORY: No cough, wheezing, hemoptysis; No shortness of breath  CARDIOVASCULAR: No chest pain or palpitations  GASTROINTESTINAL: No abdominal or epigastric pain. No nausea, vomiting, or hematemesis; No diarrhea or constipation. No melena or hematochezia.  GENITOURINARY: No dysuria, frequency or hematuria  NEUROLOGICAL: No numbness or weakness  SKIN: No itching, rashes      PAST MEDICAL & SURGICAL HISTORY:  Dyslipidemia  Essential hypertension  CAD (coronary artery disease): Stent 2012 [facility]  Atrial fibrillation  MI (myocardial infarction)  Diverticulitis  Dislocation of right shoulder joint, sequela: s/p surgery yrs ago  S/P tonsillectomy  S/P appendectomy      SOCIAL HISTORY:  No tobacco, ethanol, or drug abuse.    FAMILY HISTORY:  Family history of prostate cancer (Uncle)  Family history of myocardial infarction (Father)    No family history of acute MI or sudden cardiac death.    MEDICATIONS  (STANDING):  aspirin  chewable 81 milliGRAM(s) Oral daily  atorvastatin 40 milliGRAM(s) Oral at bedtime  digoxin     Tablet 0.125 milliGRAM(s) Oral daily  docusate sodium 100 milliGRAM(s) Oral three times a day  enoxaparin Injectable 40 milliGRAM(s) SubCutaneous daily  influenza   Vaccine 0.5 milliLiter(s) IntraMuscular once  lactobacillus acidophilus 1 Tablet(s) Oral three times a day with meals  metoprolol succinate  milliGRAM(s) Oral daily  multivitamin 1 Tablet(s) Oral daily  piperacillin/tazobactam IVPB. 3.375 Gram(s) IV Intermittent every 8 hours  potassium acid phosphate/sodium acid phosphate tablet (K-PHOS No. 2) 1 Tablet(s) Oral four times a day with meals  tamsulosin 0.4 milliGRAM(s) Oral at bedtime    MEDICATIONS  (PRN):  melatonin 3 milliGRAM(s) Oral at bedtime PRN Insomnia      Allergies    sulfa drugs (Rash)    Intolerances    lactose (Unknown)      Home meds:  Home Medications:  alfuzosin 10 mg oral tablet, extended release: 1 tab(s) orally once a day (08 Sep 2018 16:31)  atorvastatin 40 mg oral tablet: 1 tab(s) orally once a day (08 Sep 2018 16:31)  Centrum Silver oral tablet: 1 tab(s) orally once a day (08 Sep 2018 16:31)  digoxin 125 mcg (0.125 mg) oral tablet: 1 tab(s) orally once a day (08 Sep 2018 16:31)  Ecotrin Adult Low Strength 81 mg oral delayed release tablet: 1 tab(s) orally once a day (08 Sep 2018 16:31)  Metoprolol Succinate  mg oral tablet, extended release: 1 tab(s) orally once a day (08 Sep 2018 16:31)  warfarin 5 mg oral tablet: 1 tab(s) orally Monday through Saturday  (08 Sep 2018 16:31)  warfarin 7.5 mg oral tablet: orally every Sunday (08 Sep 2018 16:31)      VITAL SIGNS:   Vital Signs Last 24 Hrs  T(C): 36.8 (13 Sep 2018 07:55), Max: 37 (12 Sep 2018 16:26)  T(F): 98.3 (13 Sep 2018 07:55), Max: 98.6 (12 Sep 2018 16:26)  HR: 77 (13 Sep 2018 07:55) (77 - 86)  BP: 123/85 (13 Sep 2018 07:55) (123/85 - 137/87)  BP(mean): --  RR: 17 (13 Sep 2018 07:55) (16 - 17)  SpO2: 97% (13 Sep 2018 07:55) (97% - 99%)    I&O's Summary    12 Sep 2018 07:01  -  13 Sep 2018 07:00  --------------------------------------------------------  IN: 700 mL / OUT: 1900 mL / NET: -1200 mL        On Exam:  TELE: off  Constitutional: NAD, awake and alert, well-developed  HEENT: Moist Mucous Membranes, Anicteric  Pulmonary: Non-labored, breath sounds are clear bilaterally, No wheezing, rales or rhonchi  Cardiovascular: Irregular, S1 and S2, No murmurs, rubs, gallops or clicks  Gastrointestinal: Bowel Sounds present, soft, nontender.   Lymph: No peripheral edema. No lymphadenopathy.  Skin: Sore/mildly tender to palpate LLQ rate 1/10; right buttocks hematoma, left lateral thigh bump resolved; PVD stasis skin changes =b/l lower ext  Psych:  Mood & affect appropriate    LABS: All Labs Reviewed:                        12.8   4.48  )-----------( 149      ( 13 Sep 2018 07:34 )             36.9                         12.5   4.05  )-----------( 113      ( 12 Sep 2018 06:29 )             35.7                         11.8   4.95  )-----------( 107      ( 11 Sep 2018 06:30 )             33.8     13 Sep 2018 07:34    143    |  109    |  13     ----------------------------<  98     3.8     |  29     |  0.85   12 Sep 2018 06:29    143    |  107    |  10     ----------------------------<  96     3.4     |  28     |  0.95   11 Sep 2018 06:30    144    |  108    |  9      ----------------------------<  76     3.3     |  27     |  0.87     Ca    9.0        13 Sep 2018 07:34  Ca    8.6        12 Sep 2018 06:29  Ca    8.4        11 Sep 2018 06:30  Mg     2.2       12 Sep 2018 06:29    TPro  7.2    /  Alb  3.2    /  TBili  1.3    /  DBili  x      /  AST  37     /  ALT  38     /  AlkPhos  68     13 Sep 2018 07:34  TPro  7.0    /  Alb  3.2    /  TBili  1.6    /  DBili  .40    /  AST  28     /  ALT  23     /  AlkPhos  65     12 Sep 2018 06:29  TPro  6.8    /  Alb  3.0    /  TBili  1.9    /  DBili  x      /  AST  32     /  ALT  24     /  AlkPhos  61     11 Sep 2018 06:30    PT/INR - ( 13 Sep 2018 07:34 )   PT: 13.8 sec;   INR: 1.26 ratio         PTT - ( 13 Sep 2018 07:34 )  PTT:30.4 sec      Blood Culture: Organism --  Gram Stain Blood -- Gram Stain --  Specimen Source .Blood Blood  Culture-Blood --    Organism --  Gram Stain Blood -- Gram Stain --  Specimen Source .Urine Clean Catch (Midstream)  Culture-Blood --      RADIOLOGY:    < from: Xray Chest 1 View- PORTABLE-Routine (09.08.18 @ 14:52) >  FINDINGS:   The lungs  are clear.  No pleural abnormality is seen.         The heart and mediastinum are within normal limits.         Visualized osseous structures are intact.        IMPRESSION:   No evidence of active chest disease.          < from: CT Abdomen and Pelvis w/ Oral Cont and w/ IV Cont (09.08.18 @ 14:17) >  IMPRESSION: Perforated diverticulitis with moderate pneumoperitoneum. No   intraperitoneal abscess.

## 2018-09-13 NOTE — PROGRESS NOTE ADULT - PROBLEM SELECTOR PROBLEM 1
Diverticulitis of large intestine with perforation, unspecified bleeding status

## 2018-09-13 NOTE — PROGRESS NOTE ADULT - SUBJECTIVE AND OBJECTIVE BOX
INTERVAL HPI/OVERNIGHT EVENTS:  pt seen and examined  denies n/v/abd pain, tolerating diet, +bm yesterday  per overnight rn  no acute gi issues  afebrile overnight labs    MEDICATIONS  (STANDING):  aspirin  chewable 81 milliGRAM(s) Oral daily  atorvastatin 40 milliGRAM(s) Oral at bedtime  digoxin     Tablet 0.125 milliGRAM(s) Oral daily  docusate sodium 100 milliGRAM(s) Oral three times a day  enoxaparin Injectable 40 milliGRAM(s) SubCutaneous daily  influenza   Vaccine 0.5 milliLiter(s) IntraMuscular once  lactobacillus acidophilus 1 Tablet(s) Oral three times a day with meals  metoprolol succinate  milliGRAM(s) Oral daily  multivitamin 1 Tablet(s) Oral daily  piperacillin/tazobactam IVPB. 3.375 Gram(s) IV Intermittent every 8 hours  potassium acid phosphate/sodium acid phosphate tablet (K-PHOS No. 2) 1 Tablet(s) Oral four times a day with meals  tamsulosin 0.4 milliGRAM(s) Oral at bedtime    MEDICATIONS  (PRN):  melatonin 3 milliGRAM(s) Oral at bedtime PRN Insomnia      Allergies    sulfa drugs (Rash)    Intolerances    lactose (Unknown)      Review of Systems:    General:  No wt loss, fevers, chills, night sweats, fatigue   Eyes:  Good vision, no reported pain  ENT:  No sore throat, pain, runny nose, dysphagia  CV:  No pain, palpitations, hypo/hypertension  Resp:  No dyspnea, cough, tachypnea, wheezing  GI:  No pain, No nausea, No vomiting, No diarrhea, No constipation, No weight loss, No fever, No pruritis, No rectal bleeding, No melena, No dysphagia  :  No pain, bleeding, incontinence, nocturia  Muscle:  No pain, weakness  Neuro:  No weakness, tingling, memory problems  Psych:  No fatigue, insomnia, mood problems, depression  Endocrine:  No polyuria, polydypsia, cold/heat intolerance  Heme:  No petechiae, ecchymosis, easy bruisability  Skin:  No rash, tattoos, scars, edema      Vital Signs Last 24 Hrs  T(C): 36.8 (13 Sep 2018 07:55), Max: 37 (12 Sep 2018 16:26)  T(F): 98.3 (13 Sep 2018 07:55), Max: 98.6 (12 Sep 2018 16:26)  HR: 77 (13 Sep 2018 07:55) (77 - 86)  BP: 123/85 (13 Sep 2018 07:55) (123/85 - 137/87)  BP(mean): --  RR: 17 (13 Sep 2018 07:55) (16 - 17)  SpO2: 97% (13 Sep 2018 07:55) (97% - 99%)    PHYSICAL EXAM:    Constitutional: NAD, lying in bed  HEENT: EOMI, perrl  Neck: No LAD  Respiratory: dec bs  Cardiovascular: S1 and S2, RRR  Gastrointestinal: soft nt nd  Extremities: No peripheral edema  Vascular: 2+ peripheral pulses  Neurological: Awake alert responds appropriately  Skin: No rashes        LABS:                        12.8   4.48  )-----------( 149      ( 13 Sep 2018 07:34 )             36.9     09-13    143  |  109<H>  |  13  ----------------------------<  98  3.8   |  29  |  0.85    Ca    9.0      13 Sep 2018 07:34  Mg     2.2     09-12    TPro  7.2  /  Alb  3.2<L>  /  TBili  1.3<H>  /  DBili  x   /  AST  37  /  ALT  38  /  AlkPhos  68  09-13    PT/INR - ( 13 Sep 2018 07:34 )   PT: 13.8 sec;   INR: 1.26 ratio         PTT - ( 13 Sep 2018 07:34 )  PTT:30.4 sec      RADIOLOGY & ADDITIONAL TESTS:

## 2018-09-13 NOTE — PROGRESS NOTE ADULT - PROVIDER SPECIALTY LIST ADULT
Cardiology
Critical Care
Gastroenterology
Infectious Disease
Internal Medicine
Surgery
Cardiology
Infectious Disease

## 2018-09-13 NOTE — PROGRESS NOTE ADULT - PROBLEM SELECTOR PROBLEM 2
Atrial fibrillation
Hyperbilirubinemia
Atrial fibrillation

## 2018-09-13 NOTE — PROGRESS NOTE ADULT - SUBJECTIVE AND OBJECTIVE BOX
Patient is a 68y old  Male who presents with a chief complaint of abdominal pain (09 Sep 2018 08:36)      INTERVAL HPI:  68 year old M with PMHx of A.fib on Coumadin and digoxin, MI x1 stent (July 2012, LAD) and extensive history of diverticulitis (5 episodes in the past) who presented with LLQ abdominal pain starting at 4AM today. He describes the pain as sharp and constant. Additionally he was nauseous and "retching this morning". He states he was performing his routine ADL's yesterday. He did not take any medications for the pain. He was unable to take his medications today. He last ate yesterday evening. He reports feeling constipated for the last couple of days but had BM yesterday morning with bleeding  He denies fevers, chills, chest pain, palpitations, sob. Does not remember when his last colonoscopy was performed.     In the ED, the patient's vital signs were T: 98.8, , /88, R: 16, SpO2 98% on RA. EKG: Atrial fibrillation @110bpm. CXR: No evidence of active chest disease. CT abdomen with oral contrast: Perforated diverticulitis with moderate pneumoperitoneum. No intraperitoneal abscess. WBC of 10.76. CMP significant for bili of 1.4. Dr. Oseguera evaluated patient in ED, recommended ICU admission and non-surgical management at this time. Given the possibility of urgent surgery Pt was given K centra in ER, Pt Got IV Fluid Bolus & IV Zosyn x 1 in ER.    9/9/18: Pt seen, examined in ICU, Feels a lot better today, NPO, IV Fluids, IV Abx Pt had a BM. WBC- Normal.  9/10/18: Patient seen and examined at bedside in 2E. Patient stated that he had a BM yesterday, is passing flatus, and LLQ pain is significantly improved. He reported that he fell when getting up to use the bathroom last night, attributed it to taking Ambien for trouble sleeping. Patient stated that he feels fine now, having no pain, and does not want imaging. Pt had some clear liquid diet, D/W GI DR Tobin, D/W DR Oseguera- Keep NPO for Now, Possible clear liquid in AM. Restart Coumadin when pt tolerates PO diet.  9/11/18: Patient seen and examined at bedside. Patient reported that his abdominal pain had completely resolved. Patient felt sore on his right buttock but reported he was ambulating normally. Patient reported that Melatonin helped him sleep well and he woke up feeling "refreshed, like himself." Patient also stated that he felt emotional due to today being 9/11, was counseled extensively. Full liquid diet started today by surgery.  9/12/18: Patient seen and examined at bedside, reported that he felt well, having regular BMs and had no complaints. Low residue diet started by surgery. Per surgery and cardio, starting back on Coumadin today.  9/13/18: Patient seen and examined at bedside, reported feeling well with no complaints. Patient states his BMs are soft and not fully formed.    OVERNIGHT EVENTS: None    Home Medications:  alfuzosin 10 mg oral tablet, extended release: 1 tab(s) orally once a day (08 Sep 2018 16:31)  atorvastatin 40 mg oral tablet: 1 tab(s) orally once a day (08 Sep 2018 16:31)  Centrum Silver oral tablet: 1 tab(s) orally once a day (08 Sep 2018 16:31)  digoxin 125 mcg (0.125 mg) oral tablet: 1 tab(s) orally once a day (08 Sep 2018 16:31)  Ecotrin Adult Low Strength 81 mg oral delayed release tablet: 1 tab(s) orally once a day (08 Sep 2018 16:31)  Metoprolol Succinate  mg oral tablet, extended release: 1 tab(s) orally once a day (08 Sep 2018 16:31)  warfarin 5 mg oral tablet: 1 tab(s) orally Monday through Saturday  (08 Sep 2018 16:31)  warfarin 7.5 mg oral tablet: orally every Sunday (08 Sep 2018 16:31)    MEDICATIONS  (STANDING):  aspirin  chewable 81 milliGRAM(s) Oral daily  atorvastatin 40 milliGRAM(s) Oral at bedtime  digoxin     Tablet 0.125 milliGRAM(s) Oral daily  docusate sodium 100 milliGRAM(s) Oral three times a day  enoxaparin Injectable 40 milliGRAM(s) SubCutaneous daily  influenza   Vaccine 0.5 milliLiter(s) IntraMuscular once  lactobacillus acidophilus 1 Tablet(s) Oral three times a day with meals  metoprolol succinate  milliGRAM(s) Oral daily  multivitamin 1 Tablet(s) Oral daily  piperacillin/tazobactam IVPB. 3.375 Gram(s) IV Intermittent every 8 hours  potassium acid phosphate/sodium acid phosphate tablet (K-PHOS No. 2) 1 Tablet(s) Oral four times a day with meals  tamsulosin 0.4 milliGRAM(s) Oral at bedtime    MEDICATIONS  (PRN):  melatonin 3 milliGRAM(s) Oral at bedtime PRN Insomnia      Allergies  sulfa drugs (Rash)    Intolerances  lactose (Unknown)      REVIEW OF SYSTEMS: Feels well, no complaints  CONSTITUTIONAL: No fever, No chills, No fatigue, No myalgia, No Body ache, No Weakness  EYES: No eye pain,  No visual disturbances, No discharge, No Redness  ENMT:  No ear pain, No nose bleed, No vertigo; No sinus or throat pain, No Congestion  NECK: No pain, No stiffness  RESPIRATORY: No cough, wheezing, No  hemoptysis, No shortness of breath  CARDIOVASCULAR: No chest pain, palpitations  GASTROINTESTINAL: No abdominal or epigastric pain. No nausea, No vomiting; No diarrhea or constipation. [ x ] BM, soft  GENITOURINARY: No dysuria, No frequency, No urgency, No hematuria, or incontinence  NEUROLOGICAL: No headaches, No dizziness, No numbness, No tingling, No tremors, No weakness  EXT: No Swelling, No Pain, No Edema  SKIN: [ x ] No itching, burning, rashes, or lesions   MUSCULOSKELETAL: No joint pain or swelling; No muscle pain, No back pain, No extremity pain  PSYCHIATRIC: No depression, anxiety, mood swings or difficulty sleeping at night  PAIN SCALE: [  ] None  [ x ] Other-Mild soreness at right buttock (improved)  ROS Unable to obtain due to - [  ] Dementia  [  ] Lethargy  [  ] Sedated [  ] non verbal  REST OF REVIEW Of SYSTEM - [ x ] Normal       Vital Signs Last 24 Hrs  T(C): 36.8 (13 Sep 2018 07:55), Max: 37 (12 Sep 2018 16:26)  T(F): 98.3 (13 Sep 2018 07:55), Max: 98.6 (12 Sep 2018 16:26)  HR: 77 (13 Sep 2018 07:55) (77 - 86)  BP: 123/85 (13 Sep 2018 07:55) (123/85 - 137/87)  BP(mean): --  RR: 17 (13 Sep 2018 07:55) (16 - 17)  SpO2: 97% (13 Sep 2018 07:55) (97% - 99%)      I&O's Summary    12 Sep 2018 07:01  -  13 Sep 2018 07:00  --------------------------------------------------------  IN: 700 mL / OUT: 1900 mL / NET: -1200 mL        PHYSICAL EXAM:  GENERAL:  [ x ] NAD , [ x ] well appearing, [  ] Agitated, [  ] Drowsy,  [  ] Lethargy, [  ] confused   HEAD:  [ x ] Normal, [  ] Other  EYES:  [ x ] EOMI, [ x ] PERRLA, [ x ] conjunctiva and sclera clear normal, [  ] Other,  [  ] Pallor, [  ] Discharge  ENMT:  [ x ] Normal, [ x ] Moist mucous membranes, [ x ] Good dentition, [ x ] No Thrush  NECK:  [ x ] Supple, [ x ] No JVD, [ x ] Normal thyroid, [  ] Lymphadenopathy [  ] Other  CHEST/LUNG:  [ x ] Clear to auscultation bilaterally, [ x ] Breath Sounds equal,  [ x ] No rales, [ x ] No rhonchi  [ x ]  No wheezing  HEART:  [ x ] Regular rate and rhythm, [  ] tachycardia, [  ] Bradycardia,  [ x ] irregular  [ x ] No murmurs, No rubs, No gallops, [  ] PPM in place (Mfr:  )  ABDOMEN:  [ x ] Soft, [ x ] Sore/mildly tender to palpation at LLQ rated 1/10, [ x ] Nondistended, [ x ] No mass, [ x ] Bowel sounds present, [  ] obese, No R/R/G  NERVOUS SYSTEM:  [ x ] Alert & Oriented X3, [ x ] Nonfocal  [  ] Confusion  [  ] Encephalopathic [  ] Sedated [  ] Unable to assess, [  ] Other-  EXTREMITIES: [ x ] 2+ Peripheral Pulses, No clubbing, No cyanosis,  [  ] edema B/L lower EXT. [  ] PVD stasis skin changes B/L Lower EXT  LYMPH: No lymphadenopathy noted  SKIN:  [ x ] No rashes or lesions, [  ] Pressure Ulcers, [ x ] R buttock hematoma, [  ] Skin Tears, [ x ] Other- L lateral thigh Bump resolved    DIET: DASH/TLC - low residue/fiber    LABS:                        12.8   4.48  )-----------( 149      ( 13 Sep 2018 07:34 )             36.9     13 Sep 2018 07:34    143    |  109    |  13     ----------------------------<  98     3.8     |  29     |  0.85     Ca    9.0        13 Sep 2018 07:34  Mg     2.2       12 Sep 2018 06:29    TPro  7.2    /  Alb  3.2    /  TBili  1.3    /  DBili  x      /  AST  37     /  ALT  38     /  AlkPhos  68     13 Sep 2018 07:34    LIVER FUNCTIONS - ( 13 Sep 2018 07:34 )  Alb: 3.2 g/dL / Pro: 7.2 g/dL / ALK PHOS: 68 U/L / ALT: 38 U/L / AST: 37 U/L / GGT: x           PT/INR - ( 13 Sep 2018 07:34 )   PT: 13.8 sec;   INR: 1.26 ratio    PTT - ( 13 Sep 2018 07:34 )  PTT:30.4 sec      Culture - Blood (collected 08 Sep 2018 18:53)  Source: .Blood Blood  Preliminary Report (09 Sep 2018 19:01):    No growth to date.    Culture - Blood (collected 08 Sep 2018 18:53)  Source: .Blood Blood  Preliminary Report (09 Sep 2018 19:01):    No growth to date.    Culture - Urine (collected 08 Sep 2018 16:49)  Source: .Urine Clean Catch (Midstream)  Final Report (09 Sep 2018 22:22):    <10,000 CFU/ml    Normal Urogenital bryce present          RADIOLOGY & ADDITIONAL TESTS: NONE      HEALTH ISSUES - PROBLEM Dx:  Need for prophylactic measure: Need for prophylactic measure  History of myocardial infarction: History of myocardial infarction  Atrial fibrillation: Atrial fibrillation  Diverticulitis of large intestine with perforation, unspecified bleeding status: Diverticulitis of large intestine with perforation, unspecified bleeding status        Consultant(s) Notes Reviewed:  [ x ] YES     Care Discussed with [ x ] Consultants  [ x ] Patient  [ x ] Family-  [  ]   [  ] Social Service  [ x ] RN, [  ] Physical Therapy  DVT PPX: [ x ] Lovenox, [  ] S C Heparin, [ x ] Coumadin, [  ] Xarelto, [  ] Eliquis, [  ] Pradaxa, [  ] IV Heparin drip, [ x ] SCD, [  ] Contraindication 2 to GI Bleed, [ x ] Ambulation  Advanced directive: [ x ] None, [  ] DNR/DNI Patient is a 68y old  Male who presents with a chief complaint of abdominal pain (09 Sep 2018 08:36)      INTERVAL HPI:  68 year old M with PMHx of A.fib on Coumadin and digoxin, MI x1 stent (July 2012, LAD) and extensive history of diverticulitis (5 episodes in the past) who presented with LLQ abdominal pain starting at 4AM today. He describes the pain as sharp and constant. Additionally he was nauseous and "retching this morning". He states he was performing his routine ADL's yesterday. He did not take any medications for the pain. He was unable to take his medications today. He last ate yesterday evening. He reports feeling constipated for the last couple of days but had BM yesterday morning with bleeding  He denies fevers, chills, chest pain, palpitations, sob. Does not remember when his last colonoscopy was performed.     In the ED, the patient's vital signs were T: 98.8, , /88, R: 16, SpO2 98% on RA. EKG: Atrial fibrillation @110bpm. CXR: No evidence of active chest disease. CT abdomen with oral contrast: Perforated diverticulitis with moderate pneumoperitoneum. No intraperitoneal abscess. WBC of 10.76. CMP significant for bili of 1.4. Dr. Oseguera evaluated patient in ED, recommended ICU admission and non-surgical management at this time. Given the possibility of urgent surgery Pt was given K centra in ER, Pt Got IV Fluid Bolus & IV Zosyn x 1 in ER.    9/9/18: Pt seen, examined in ICU, Feels a lot better today, NPO, IV Fluids, IV Abx Pt had a BM. WBC- Normal.  9/10/18: Patient seen and examined at bedside in 2E. Patient stated that he had a BM yesterday, is passing flatus, and LLQ pain is significantly improved. He reported that he fell when getting up to use the bathroom last night, attributed it to taking Ambien for trouble sleeping. Patient stated that he feels fine now, having no pain, and does not want imaging. Pt had some clear liquid diet, D/W GI DR Tobin, D/W DR Oseguera- Keep NPO for Now, Possible clear liquid in AM. Restart Coumadin when pt tolerates PO diet.  9/11/18: Patient seen and examined at bedside. Patient reported that his abdominal pain had completely resolved. Patient felt sore on his right buttock but reported he was ambulating normally. Patient reported that Melatonin helped him sleep well and he woke up feeling "refreshed, like himself." Patient also stated that he felt emotional due to today being 9/11, was counseled extensively. Full liquid diet started today by surgery.  9/12/18: Patient seen and examined at bedside, reported that he felt well, having regular BMs and had no complaints. Low residue diet started by surgery. Per surgery and cardio, starting back on Coumadin today.  9/13/18: Patient seen and examined at bedside, reported feeling well with no complaints. Patient states his BMs are soft and not fully formed. ON PO diet. Stable for D/C.    OVERNIGHT EVENTS: None    Home Medications:  alfuzosin 10 mg oral tablet, extended release: 1 tab(s) orally once a day (08 Sep 2018 16:31)  atorvastatin 40 mg oral tablet: 1 tab(s) orally once a day (08 Sep 2018 16:31)  Centrum Silver oral tablet: 1 tab(s) orally once a day (08 Sep 2018 16:31)  digoxin 125 mcg (0.125 mg) oral tablet: 1 tab(s) orally once a day (08 Sep 2018 16:31)  Ecotrin Adult Low Strength 81 mg oral delayed release tablet: 1 tab(s) orally once a day (08 Sep 2018 16:31)  Metoprolol Succinate  mg oral tablet, extended release: 1 tab(s) orally once a day (08 Sep 2018 16:31)  warfarin 5 mg oral tablet: 1 tab(s) orally Monday through Saturday  (08 Sep 2018 16:31)  warfarin 7.5 mg oral tablet: orally every Sunday (08 Sep 2018 16:31)    MEDICATIONS  (STANDING):  aspirin  chewable 81 milliGRAM(s) Oral daily  atorvastatin 40 milliGRAM(s) Oral at bedtime  digoxin     Tablet 0.125 milliGRAM(s) Oral daily  docusate sodium 100 milliGRAM(s) Oral three times a day  enoxaparin Injectable 40 milliGRAM(s) SubCutaneous daily  influenza   Vaccine 0.5 milliLiter(s) IntraMuscular once  lactobacillus acidophilus 1 Tablet(s) Oral three times a day with meals  metoprolol succinate  milliGRAM(s) Oral daily  multivitamin 1 Tablet(s) Oral daily  piperacillin/tazobactam IVPB. 3.375 Gram(s) IV Intermittent every 8 hours  potassium acid phosphate/sodium acid phosphate tablet (K-PHOS No. 2) 1 Tablet(s) Oral four times a day with meals  tamsulosin 0.4 milliGRAM(s) Oral at bedtime    MEDICATIONS  (PRN):  melatonin 3 milliGRAM(s) Oral at bedtime PRN Insomnia      Allergies  sulfa drugs (Rash)    Intolerances  lactose (Unknown)      REVIEW OF SYSTEMS: Feels well, no complaints  CONSTITUTIONAL: No fever, No chills, No fatigue, No myalgia, No Body ache, No Weakness  EYES: No eye pain,  No visual disturbances, No discharge, No Redness  ENMT:  No ear pain, No nose bleed, No vertigo; No sinus or throat pain, No Congestion  NECK: No pain, No stiffness  RESPIRATORY: No cough, wheezing, No  hemoptysis, No shortness of breath  CARDIOVASCULAR: No chest pain, palpitations  GASTROINTESTINAL: No abdominal or epigastric pain. No nausea, No vomiting; No diarrhea or constipation. [ x ] BM, soft  GENITOURINARY: No dysuria, No frequency, No urgency, No hematuria, or incontinence  NEUROLOGICAL: No headaches, No dizziness, No numbness, No tingling, No tremors, No weakness  EXT: No Swelling, No Pain, No Edema  SKIN: [ x ] No itching, burning, rashes, or lesions   MUSCULOSKELETAL: No joint pain or swelling; No muscle pain, No back pain, No extremity pain  PSYCHIATRIC: No depression, anxiety, mood swings or difficulty sleeping at night  PAIN SCALE: [  ] None  [ x ] Other-Mild soreness at right buttock (improved)  ROS Unable to obtain due to - [  ] Dementia  [  ] Lethargy  [  ] Sedated [  ] non verbal  REST OF REVIEW Of SYSTEM - [ x ] Normal       Vital Signs Last 24 Hrs  T(C): 36.8 (13 Sep 2018 07:55), Max: 37 (12 Sep 2018 16:26)  T(F): 98.3 (13 Sep 2018 07:55), Max: 98.6 (12 Sep 2018 16:26)  HR: 77 (13 Sep 2018 07:55) (77 - 86)  BP: 123/85 (13 Sep 2018 07:55) (123/85 - 137/87)  BP(mean): --  RR: 17 (13 Sep 2018 07:55) (16 - 17)  SpO2: 97% (13 Sep 2018 07:55) (97% - 99%)      I&O's Summary    12 Sep 2018 07:01  -  13 Sep 2018 07:00  --------------------------------------------------------  IN: 700 mL / OUT: 1900 mL / NET: -1200 mL        PHYSICAL EXAM:  GENERAL:  [ x ] NAD , [ x ] well appearing, [  ] Agitated, [  ] Drowsy,  [  ] Lethargy, [  ] confused   HEAD:  [ x ] Normal, [  ] Other  EYES:  [ x ] EOMI, [ x ] PERRLA, [ x ] conjunctiva and sclera clear normal, [  ] Other,  [  ] Pallor, [  ] Discharge  ENMT:  [ x ] Normal, [ x ] Moist mucous membranes, [ x ] Good dentition, [ x ] No Thrush  NECK:  [ x ] Supple, [ x ] No JVD, [ x ] Normal thyroid, [  ] Lymphadenopathy [  ] Other  CHEST/LUNG:  [ x ] Clear to auscultation bilaterally, [ x ] Breath Sounds equal,  [ x ] No rales, [ x ] No rhonchi  [ x ]  No wheezing  HEART:  [ x ] Regular rate and rhythm, [  ] tachycardia, [  ] Bradycardia,  [ x ] irregular  [ x ] No murmurs, No rubs, No gallops, [  ] PPM in place (Mfr:  )  ABDOMEN:  [ x ] Soft, [ x ] Sore/mildly tender to palpation at LLQ rated 1/10, [ x ] Nondistended, [ x ] No mass, [ x ] Bowel sounds present, [  ] obese, No R/R/G  NERVOUS SYSTEM:  [ x ] Alert & Oriented X3, [ x ] Nonfocal  [  ] Confusion  [  ] Encephalopathic [  ] Sedated [  ] Unable to assess, [  ] Other-  EXTREMITIES: [ x ] 2+ Peripheral Pulses, No clubbing, No cyanosis,  [  ] edema B/L lower EXT. [  ] PVD stasis skin changes B/L Lower EXT  LYMPH: No lymphadenopathy noted  SKIN:  [ x ] No rashes or lesions, [  ] Pressure Ulcers, [ x ] R buttock hematoma, [  ] Skin Tears, [ x ] Other- L lateral thigh Bump resolved    DIET: DASH/TLC - low residue/fiber    LABS:                        12.8   4.48  )-----------( 149      ( 13 Sep 2018 07:34 )             36.9     13 Sep 2018 07:34    143    |  109    |  13     ----------------------------<  98     3.8     |  29     |  0.85     Ca    9.0        13 Sep 2018 07:34  Mg     2.2       12 Sep 2018 06:29    TPro  7.2    /  Alb  3.2    /  TBili  1.3    /  DBili  x      /  AST  37     /  ALT  38     /  AlkPhos  68     13 Sep 2018 07:34    LIVER FUNCTIONS - ( 13 Sep 2018 07:34 )  Alb: 3.2 g/dL / Pro: 7.2 g/dL / ALK PHOS: 68 U/L / ALT: 38 U/L / AST: 37 U/L / GGT: x           PT/INR - ( 13 Sep 2018 07:34 )   PT: 13.8 sec;   INR: 1.26 ratio    PTT - ( 13 Sep 2018 07:34 )  PTT:30.4 sec      Culture - Blood (collected 08 Sep 2018 18:53)  Source: .Blood Blood  Preliminary Report (09 Sep 2018 19:01):    No growth to date.    Culture - Blood (collected 08 Sep 2018 18:53)  Source: .Blood Blood  Preliminary Report (09 Sep 2018 19:01):    No growth to date.    Culture - Urine (collected 08 Sep 2018 16:49)  Source: .Urine Clean Catch (Midstream)  Final Report (09 Sep 2018 22:22):    <10,000 CFU/ml    Normal Urogenital bryce present    RADIOLOGY & ADDITIONAL TESTS: NONE      HEALTH ISSUES - PROBLEM Dx:  Need for prophylactic measure: Need for prophylactic measure  History of myocardial infarction: History of myocardial infarction  Atrial fibrillation: Atrial fibrillation  Diverticulitis of large intestine with perforation, unspecified bleeding status: Diverticulitis of large intestine with perforation, unspecified bleeding status        Consultant(s) Notes Reviewed:  [ x ] YES     Care Discussed with [ x ] Consultants  [ x ] Patient  [ x ] Family-  [  ]   [  ] Social Service  [ x ] RN, [  ] Physical Therapy  DVT PPX: [ x ] Lovenox, [  ] S C Heparin, [ x ] Coumadin, [  ] Xarelto, [  ] Eliquis, [  ] Pradaxa, [  ] IV Heparin drip, [ x ] SCD, [  ] Contraindication 2 to GI Bleed, [ x ] Ambulation  Advanced directive: [ x ] None, [  ] DNR/DNI Patient is a 68y old  Male who presents with a chief complaint of abdominal pain (09 Sep 2018 08:36)      INTERVAL HPI:  68 year old M with PMHx of A.fib on Coumadin and digoxin, MI x1 stent (July 2012, LAD) and extensive history of diverticulitis (5 episodes in the past) who presented with LLQ abdominal pain starting at 4AM today. He describes the pain as sharp and constant. Additionally he was nauseous and "retching this morning". He states he was performing his routine ADL's yesterday. He did not take any medications for the pain. He was unable to take his medications today. He last ate yesterday evening. He reports feeling constipated for the last couple of days but had BM yesterday morning with bleeding  He denies fevers, chills, chest pain, palpitations, sob. Does not remember when his last colonoscopy was performed.     In the ED, the patient's vital signs were T: 98.8, , /88, R: 16, SpO2 98% on RA. EKG: Atrial fibrillation @110bpm. CXR: No evidence of active chest disease. CT abdomen with oral contrast: Perforated diverticulitis with moderate pneumoperitoneum. No intraperitoneal abscess. WBC of 10.76. CMP significant for bili of 1.4. Dr. Oseguera evaluated patient in ED, recommended ICU admission and non-surgical management at this time. Given the possibility of urgent surgery Pt was given K centra in ER, Pt Got IV Fluid Bolus & IV Zosyn x 1 in ER.    9/9/18: Pt seen, examined in ICU, Feels a lot better today, NPO, IV Fluids, IV Abx Pt had a BM. WBC- Normal.  9/10/18: Patient seen and examined at bedside in 2E. Patient stated that he had a BM yesterday, is passing flatus, and LLQ pain is significantly improved. He reported that he fell when getting up to use the bathroom last night, attributed it to taking Ambien for trouble sleeping. Patient stated that he feels fine now, having no pain, and does not want imaging. Pt had some clear liquid diet, D/W GI DR Tobin, D/W DR Oseguera- Keep NPO for Now, Possible clear liquid in AM. Restart Coumadin when pt tolerates PO diet.  9/11/18: Patient seen and examined at bedside. Patient reported that his abdominal pain had completely resolved. Patient felt sore on his right buttock but reported he was ambulating normally. Patient reported that Melatonin helped him sleep well and he woke up feeling "refreshed, like himself." Patient also stated that he felt emotional due to today being 9/11, was counseled extensively. Full liquid diet started today by surgery.  9/12/18: Patient seen and examined at bedside, reported that he felt well, having regular BMs and had no complaints. Low residue diet started by surgery. Per surgery and cardio, starting back on Coumadin today.  9/13/18: Patient seen and examined at bedside, reported feeling well with no complaints. Patient states his BMs are soft and not fully formed. ON PO diet. Stable for D/C.    OVERNIGHT EVENTS: None    Home Medications:  alfuzosin 10 mg oral tablet, extended release: 1 tab(s) orally once a day (08 Sep 2018 16:31)  atorvastatin 40 mg oral tablet: 1 tab(s) orally once a day (08 Sep 2018 16:31)  Centrum Silver oral tablet: 1 tab(s) orally once a day (08 Sep 2018 16:31)  digoxin 125 mcg (0.125 mg) oral tablet: 1 tab(s) orally once a day (08 Sep 2018 16:31)  Ecotrin Adult Low Strength 81 mg oral delayed release tablet: 1 tab(s) orally once a day (08 Sep 2018 16:31)  Metoprolol Succinate  mg oral tablet, extended release: 1 tab(s) orally once a day (08 Sep 2018 16:31)  warfarin 5 mg oral tablet: 1 tab(s) orally Monday through Saturday  (08 Sep 2018 16:31)  warfarin 7.5 mg oral tablet: orally every Sunday (08 Sep 2018 16:31)    MEDICATIONS  (STANDING):  aspirin  chewable 81 milliGRAM(s) Oral daily  atorvastatin 40 milliGRAM(s) Oral at bedtime  digoxin     Tablet 0.125 milliGRAM(s) Oral daily  docusate sodium 100 milliGRAM(s) Oral three times a day  enoxaparin Injectable 40 milliGRAM(s) SubCutaneous daily  influenza   Vaccine 0.5 milliLiter(s) IntraMuscular once  lactobacillus acidophilus 1 Tablet(s) Oral three times a day with meals  metoprolol succinate  milliGRAM(s) Oral daily  multivitamin 1 Tablet(s) Oral daily  piperacillin/tazobactam IVPB. 3.375 Gram(s) IV Intermittent every 8 hours  potassium acid phosphate/sodium acid phosphate tablet (K-PHOS No. 2) 1 Tablet(s) Oral four times a day with meals  tamsulosin 0.4 milliGRAM(s) Oral at bedtime    MEDICATIONS  (PRN):  melatonin 3 milliGRAM(s) Oral at bedtime PRN Insomnia      Allergies  sulfa drugs (Rash)    Intolerances  lactose (Unknown)      REVIEW OF SYSTEMS: Feels well, no complaints  CONSTITUTIONAL: No fever, No chills, No fatigue, No myalgia, No Body ache, No Weakness  EYES: No eye pain,  No visual disturbances, No discharge, No Redness  ENMT:  No ear pain, No nose bleed, No vertigo; No sinus or throat pain, No Congestion  NECK: No pain, No stiffness  RESPIRATORY: No cough, wheezing, No  hemoptysis, No shortness of breath  CARDIOVASCULAR: No chest pain, palpitations  GASTROINTESTINAL: No abdominal or epigastric pain. No nausea, No vomiting; No diarrhea or constipation. [ x ] BM, soft  GENITOURINARY: No dysuria, No frequency, No urgency, No hematuria, or incontinence  NEUROLOGICAL: No headaches, No dizziness, No numbness, No tingling, No tremors, No weakness  EXT: No Swelling, No Pain, No Edema  SKIN: [ x ] No itching, burning, rashes, or lesions   MUSCULOSKELETAL: No joint pain or swelling; No muscle pain, No back pain, No extremity pain  PSYCHIATRIC: No depression, anxiety, mood swings or difficulty sleeping at night  PAIN SCALE: [  ] None  [ x ] Other-Mild soreness at right buttock (improved)  ROS Unable to obtain due to - [  ] Dementia  [  ] Lethargy  [  ] Sedated [  ] non verbal  REST OF REVIEW Of SYSTEM - [ x ] Normal       Vital Signs Last 24 Hrs  T(C): 36.8 (13 Sep 2018 07:55), Max: 37 (12 Sep 2018 16:26)  T(F): 98.3 (13 Sep 2018 07:55), Max: 98.6 (12 Sep 2018 16:26)  HR: 77 (13 Sep 2018 07:55) (77 - 86)  BP: 123/85 (13 Sep 2018 07:55) (123/85 - 137/87)  BP(mean): --  RR: 17 (13 Sep 2018 07:55) (16 - 17)  SpO2: 97% (13 Sep 2018 07:55) (97% - 99%)      I&O's Summary    12 Sep 2018 07:01  -  13 Sep 2018 07:00  --------------------------------------------------------  IN: 700 mL / OUT: 1900 mL / NET: -1200 mL        PHYSICAL EXAM:  GENERAL:  [ x ] NAD , [ x ] well appearing, [  ] Agitated, [  ] Drowsy,  [  ] Lethargy, [  ] confused   HEAD:  [ x ] Normal, [  ] Other  EYES:  [ x ] EOMI, [ x ] PERRLA, [ x ] conjunctiva and sclera clear normal, [  ] Other,  [  ] Pallor, [  ] Discharge  ENMT:  [ x ] Normal, [ x ] Moist mucous membranes, [ x ] Good dentition, [ x ] No Thrush  NECK:  [ x ] Supple, [ x ] No JVD, [ x ] Normal thyroid, [  ] Lymphadenopathy [  ] Other  CHEST/LUNG:  [ x ] Clear to auscultation bilaterally, [ x ] Breath Sounds equal,  [ x ] No rales, [ x ] No rhonchi  [ x ]  No wheezing  HEART:  [ x ] Regular rate and rhythm, [  ] tachycardia, [  ] Bradycardia,  [ x ] irregular  [ x ] No murmurs, No rubs, No gallops, [  ] PPM in place (Mfr:  )  ABDOMEN:  [ x ] Soft, [ x ] NON Tender, [ x ] Nondistended, [ x ] No mass, [ x ] Bowel sounds present, [  ] obese, No R/R/G  NERVOUS SYSTEM:  [ x ] Alert & Oriented X3, [ x ] Nonfocal  [  ] Confusion  [  ] Encephalopathic [  ] Sedated [  ] Unable to assess, [  ] Other-  EXTREMITIES: [ x ] 2+ Peripheral Pulses, No clubbing, No cyanosis,  [  ] edema B/L lower EXT. [  ] PVD stasis skin changes B/L Lower EXT  LYMPH: No lymphadenopathy noted  SKIN:  [ x ] No rashes or lesions, [  ] Pressure Ulcers, [ x ] R buttock hematoma, [  ] Skin Tears, [ x ] Other- L lateral thigh Bump resolved    DIET: DASH/TLC - low residue/fiber    LABS:                        12.8   4.48  )-----------( 149      ( 13 Sep 2018 07:34 )             36.9     13 Sep 2018 07:34    143    |  109    |  13     ----------------------------<  98     3.8     |  29     |  0.85     Ca    9.0        13 Sep 2018 07:34  Mg     2.2       12 Sep 2018 06:29    TPro  7.2    /  Alb  3.2    /  TBili  1.3    /  DBili  x      /  AST  37     /  ALT  38     /  AlkPhos  68     13 Sep 2018 07:34    LIVER FUNCTIONS - ( 13 Sep 2018 07:34 )  Alb: 3.2 g/dL / Pro: 7.2 g/dL / ALK PHOS: 68 U/L / ALT: 38 U/L / AST: 37 U/L / GGT: x           PT/INR - ( 13 Sep 2018 07:34 )   PT: 13.8 sec;   INR: 1.26 ratio    PTT - ( 13 Sep 2018 07:34 )  PTT:30.4 sec      Culture - Blood (collected 08 Sep 2018 18:53)  Source: .Blood Blood  Preliminary Report (09 Sep 2018 19:01):    No growth to date.    Culture - Blood (collected 08 Sep 2018 18:53)  Source: .Blood Blood  Preliminary Report (09 Sep 2018 19:01):    No growth to date.    Culture - Urine (collected 08 Sep 2018 16:49)  Source: .Urine Clean Catch (Midstream)  Final Report (09 Sep 2018 22:22):    <10,000 CFU/ml    Normal Urogenital bryce present    RADIOLOGY & ADDITIONAL TESTS: NONE      HEALTH ISSUES - PROBLEM Dx:  Need for prophylactic measure: Need for prophylactic measure  History of myocardial infarction: History of myocardial infarction  Atrial fibrillation: Atrial fibrillation  Diverticulitis of large intestine with perforation, unspecified bleeding status: Diverticulitis of large intestine with perforation, unspecified bleeding status        Consultant(s) Notes Reviewed:  [ x ] YES     Care Discussed with [ x ] Consultants  [ x ] Patient  [ x ] Family-  [  ]   [  ] Social Service  [ x ] RN, [  ] Physical Therapy  DVT PPX: [ x ] Lovenox, [  ] S C Heparin, [ x ] Coumadin, [  ] Xarelto, [  ] Eliquis, [  ] Pradaxa, [  ] IV Heparin drip, [ x ] SCD, [  ] Contraindication 2 to GI Bleed, [ x ] Ambulation  Advanced directive: [ x ] None, [  ] DNR/DNI

## 2018-09-13 NOTE — CHART NOTE - NSCHARTNOTEFT_GEN_A_CORE
Assessment: Pt seen for nutrition consult for nutrition education. As per chart pt is a 68 Male with a PMH of A.fib on Coumadin and digoxin, MI x1 stent (July 2012, LAD) and extensive history of diverticulitis (5 episodes in the past) who presented with LLQ abdominal pain, admitted for perforated diverticulitis which per chart has clinically sealed off. Pt reports good appetite and PO intake, Pt reports tolerating the low fiber diet well. Pt denies any GI distress, +BM 9/12. Pt with questions regarding low fiber diet. Pt provided with reinforced diet education, all questions addressed.     Factors impacting intake: [x ] none    Diet Presciption: Diet, DASH/TLC:   Sodium & Cholesterol Restricted  Fiber/Residue Restricted (09-12-18 @ 07:08)    Intake: good     Current Weight: (9/11) 175.9lbs  Previous Weight:  (9/9) 180.3lbs  2.4% Weight Change since previous RD note     Pertinent Medications: MEDICATIONS  (STANDING):  amoxicillin  875 milliGRAM(s)/clavulanate 1 Tablet(s) Oral two times a day  aspirin  chewable 81 milliGRAM(s) Oral daily  atorvastatin 40 milliGRAM(s) Oral at bedtime  digoxin     Tablet 0.125 milliGRAM(s) Oral daily  docusate sodium 100 milliGRAM(s) Oral three times a day  enoxaparin Injectable 40 milliGRAM(s) SubCutaneous daily  influenza   Vaccine 0.5 milliLiter(s) IntraMuscular once  lactobacillus acidophilus 1 Tablet(s) Oral three times a day with meals  metoprolol succinate  milliGRAM(s) Oral daily  multivitamin 1 Tablet(s) Oral daily  tamsulosin 0.4 milliGRAM(s) Oral at bedtime  warfarin 2.5 milliGRAM(s) Oral once    MEDICATIONS  (PRN):  melatonin 3 milliGRAM(s) Oral at bedtime PRN Insomnia    Pertinent Labs: 09-13 Na143 mmol/L Glu 98 mg/dL K+ 3.8 mmol/L Cr  0.85 mg/dL BUN 13 mg/dL 09-10 Phos 1.6 mg/dL<L> 09-13 Alb 3.2 g/dL<L>     CAPILLARY BLOOD GLUCOSE    Skin: No edema or pressure injuries noted at this time    Estimated Needs:   [x ] no change since previous assessment  [ ] recalculated:     Previous Nutrition Diagnosis:   [x ] Altered GI Function    Nutrition Diagnosis is [x ] ongoing- being addressed with low fiber diet     New Nutrition Diagnosis: [x ] not applicable       Interventions:   Recommend  [ ] Change Diet To:  [ ] Nutrition Supplement  [ ] Nutrition Support  [x ] Other:  1) Continue with current DASH/TLC, low fiber diet as medically feasible  2) Pt provided reinforced oral and written education on low fiber diet, all of pt's questions answered  3) Pt aware of RD to remain available.     Monitoring and Evaluation:   [x ] PO intake [ x ] Tolerance to diet prescription [ x ] weights [ x ] labs[ x ] follow up per protocol  [ ] other:

## 2018-09-13 NOTE — PROGRESS NOTE ADULT - PROBLEM SELECTOR PLAN 1
CT showed perforated diverticulitis with moderate pneumoperitoneum, no intraperitoneal abscess  clinically improving  diet as per surgery  f/u surgery recs, cleared from surgical perspective for dc  cont abx  cont bacid  bld cxs ngtd  monitor abd exam  pain control  will need outpatient colonoscopy in 6-8 wks, dw pt and agreeable

## 2018-09-13 NOTE — PROGRESS NOTE ADULT - PROBLEM SELECTOR PLAN 2
Atrial fibrillation, HR stable  - Continue PO Metoprolol 25mg q6h, PO Digoxin 0.125mg daily.  - D/W cardio Dr Ireland, resumed Coumadin 2.5mg yesterday with no need to bridge.  - INR 1.26 today. Atrial fibrillation, HR stable  - S/P BB, resume Toprol  mg daily , Digoxin 0.125mg daily.  - D/W cardio Dr Ireland, resumed Coumadin 2.5mg yesterday with no need to bridge. Pt will continue Coumadin at home dose today . PT/INR with cardiology office in 3-4 days ,Keep INR 2-3 d/w pt at detail.  - INR 1.26 today.

## 2018-09-13 NOTE — PROGRESS NOTE ADULT - ATTENDING COMMENTS
68 M PMH A.fib (on warfarin), CAD, HLD admitted with perforated diverticulitis.     Recs:  Neuro: no issues  CVS: on iv metoprolol, off warfarin, HR better controlled today   Resp: stable  GI: abdominal exam has not worsened, no need for emergent surgery, keep NPO, start protonix as he is on a PPI at home, further plan per sx (Dr. Santana to take over care)  Renal: stable  ID: continue zosyn, f/u BCx  Msk: ambulate  PPx: start lovenox as he is not going for surgery today    Stable for surgical floor
Pt seen and examined, agree with assessment and plan. As per request by Dr. Aguilar will take care of the pt.
Pt seen and examined. Plan discussed with the pt and surgical team
pt seen , examined . Plan discussed .
The patient was personally seen and examined, in addition to being examined and evaluated by NP.  All elements of the note were edited where appropriate.
Seen/examined. Agree with above. Back on po medications. Clinically improving. Restart coumadin for goal INR 2-3.
Thank you for the courtesy of this referral.    I'll sign off at this time.     Leland Motley MD  [phone]
I personally saw and examined the patient in detail.  I have spoken to the above provider regarding the assessment and plan of care.  I reviewed the above assessment and plan of care, and agree.  I have made changes in the body of the note where appropriate.  Awaiting being able to resume Coumadin based on surgery.
Advanced care planning was discussed with patient and family.  Advanced care planning forms were reviewed and discussed.  Risks, benefits and alternatives of gastroenterologic procedures were discussed in detail and all questions were answered.    30 minutes spent.
Pt seen, examined, case & care plan d/w pt & wife at detail,  AM Labs  NPO.
Pt seen, Examined, case & care plan d/w pt, residents, at detail.  AM labs   D/C IV Fluids.
Pt seen, examined, Case & care plan d/w pt, residents, Family, Surgery Dr Santana, Cardiology DR Ireland- Restart Coumadin , NO Need for Bridging with Lovenox/Heparin as per Cardiology.    D/C Planning Tomorrow.
Pt seen, examined, Case & care plan d/w pt , residents, ID DR Chapin at detail.  Pt stable for D/C from Surgery/GI & Cardiology stand point.  D/C Home.   Total D/C care time is 50 minutes.
Pt seen, examined, Case & care plan d/w pt, residents, Dr Oseguera. DR Tobin, & wife at detail  As per Dr Oumar Santana will follow pt from tomorrow,   NPO today,   AM Labs.

## 2018-09-18 ENCOUNTER — APPOINTMENT (OUTPATIENT)
Dept: UROLOGY | Facility: CLINIC | Age: 68
End: 2018-09-18

## 2018-10-01 PROBLEM — I21.9 ACUTE MYOCARDIAL INFARCTION, UNSPECIFIED: Chronic | Status: ACTIVE | Noted: 2018-09-08

## 2018-10-01 PROBLEM — K57.92 DIVERTICULITIS OF INTESTINE, PART UNSPECIFIED, WITHOUT PERFORATION OR ABSCESS WITHOUT BLEEDING: Chronic | Status: ACTIVE | Noted: 2018-09-08

## 2018-10-01 PROBLEM — I48.91 UNSPECIFIED ATRIAL FIBRILLATION: Chronic | Status: ACTIVE | Noted: 2018-09-08

## 2018-10-01 PROBLEM — I10 ESSENTIAL (PRIMARY) HYPERTENSION: Chronic | Status: ACTIVE | Noted: 2018-09-08

## 2018-10-01 PROBLEM — I25.10 ATHEROSCLEROTIC HEART DISEASE OF NATIVE CORONARY ARTERY WITHOUT ANGINA PECTORIS: Chronic | Status: ACTIVE | Noted: 2018-09-08

## 2018-10-01 PROBLEM — E78.5 HYPERLIPIDEMIA, UNSPECIFIED: Chronic | Status: ACTIVE | Noted: 2018-09-08

## 2018-10-09 ENCOUNTER — APPOINTMENT (OUTPATIENT)
Dept: SURGERY | Facility: CLINIC | Age: 68
End: 2018-10-09
Payer: MEDICARE

## 2018-10-09 VITALS
OXYGEN SATURATION: 99 % | RESPIRATION RATE: 14 BRPM | HEIGHT: 71 IN | WEIGHT: 174 LBS | DIASTOLIC BLOOD PRESSURE: 88 MMHG | HEART RATE: 76 BPM | BODY MASS INDEX: 24.36 KG/M2 | SYSTOLIC BLOOD PRESSURE: 151 MMHG

## 2018-10-09 PROCEDURE — 99215 OFFICE O/P EST HI 40 MIN: CPT

## 2018-10-09 RX ORDER — ALFUZOSIN HYDROCHLORIDE 10 MG/1
10 TABLET, EXTENDED RELEASE ORAL DAILY
Qty: 90 | Refills: 3 | Status: DISCONTINUED | COMMUNITY
Start: 2018-05-15 | End: 2018-10-09

## 2018-10-24 PROCEDURE — 80048 BASIC METABOLIC PNL TOTAL CA: CPT

## 2018-10-24 PROCEDURE — 96375 TX/PRO/DX INJ NEW DRUG ADDON: CPT

## 2018-10-24 PROCEDURE — 81003 URINALYSIS AUTO W/O SCOPE: CPT

## 2018-10-24 PROCEDURE — 71045 X-RAY EXAM CHEST 1 VIEW: CPT

## 2018-10-24 PROCEDURE — 87040 BLOOD CULTURE FOR BACTERIA: CPT

## 2018-10-24 PROCEDURE — 80162 ASSAY OF DIGOXIN TOTAL: CPT

## 2018-10-24 PROCEDURE — 83735 ASSAY OF MAGNESIUM: CPT

## 2018-10-24 PROCEDURE — 86901 BLOOD TYPING SEROLOGIC RH(D): CPT

## 2018-10-24 PROCEDURE — 99285 EMERGENCY DEPT VISIT HI MDM: CPT | Mod: 25

## 2018-10-24 PROCEDURE — 74177 CT ABD & PELVIS W/CONTRAST: CPT

## 2018-10-24 PROCEDURE — 80076 HEPATIC FUNCTION PANEL: CPT

## 2018-10-24 PROCEDURE — 93005 ELECTROCARDIOGRAM TRACING: CPT

## 2018-10-24 PROCEDURE — 90686 IIV4 VACC NO PRSV 0.5 ML IM: CPT

## 2018-10-24 PROCEDURE — 87086 URINE CULTURE/COLONY COUNT: CPT

## 2018-10-24 PROCEDURE — 83690 ASSAY OF LIPASE: CPT

## 2018-10-24 PROCEDURE — 86803 HEPATITIS C AB TEST: CPT

## 2018-10-24 PROCEDURE — 96374 THER/PROPH/DIAG INJ IV PUSH: CPT

## 2018-10-24 PROCEDURE — 85027 COMPLETE CBC AUTOMATED: CPT

## 2018-10-24 PROCEDURE — 86703 HIV-1/HIV-2 1 RESULT ANTBDY: CPT

## 2018-10-24 PROCEDURE — 84100 ASSAY OF PHOSPHORUS: CPT

## 2018-10-24 PROCEDURE — 80053 COMPREHEN METABOLIC PANEL: CPT

## 2018-10-24 PROCEDURE — 86850 RBC ANTIBODY SCREEN: CPT

## 2018-10-24 PROCEDURE — 85730 THROMBOPLASTIN TIME PARTIAL: CPT

## 2018-10-24 PROCEDURE — 86900 BLOOD TYPING SEROLOGIC ABO: CPT

## 2018-10-24 PROCEDURE — 85610 PROTHROMBIN TIME: CPT

## 2019-01-03 NOTE — ED ADULT NURSE NOTE - CARDIO ASSESSMENT
Billing Type: Third-Party Bill Anesthesia Volume In Cc: 0.5 Electrodesiccation And Curettage Text: The wound bed was treated with electrodesiccation and curettage after the biopsy was performed. Type Of Destruction Used: Curettage X Size Of Lesion In Cm: 0 Biopsy Type: H and E Hemostasis: Drysol Was A Bandage Applied: Yes Silver Nitrate Text: The wound bed was treated with silver nitrate after the biopsy was performed. Post-Care Instructions: I reviewed with the patient in detail post-care instructions. Patient is to keep the biopsy site dry overnight, and then apply bacitracin twice daily until healed. Patient may apply hydrogen peroxide soaks to remove any crusting. Dressing: bandage Destruction After The Procedure: No Cryotherapy Text: The wound bed was treated with cryotherapy after the biopsy was performed. Detail Level: Detailed Biopsy Method: Dermablade Lab: 253 Notification Instructions: Patient will be notified of biopsy results. However, patient instructed to call the office if not contacted within 2 weeks. Consent: Written consent was obtained and risks were reviewed including but not limited to scarring, infection, bleeding, scabbing, incomplete removal, nerve damage and allergy to anesthesia. Curettage Text: The wound bed was treated with curettage after the biopsy was performed. Electrodesiccation Text: The wound bed was treated with electrodesiccation after the biopsy was performed. Depth Of Biopsy: dermis Wound Care: Petrolatum Lab Facility:  Anesthesia Type: 1% lidocaine with epinephrine Size Of Lesion In Cm: 0.2 WDL

## 2019-07-31 ENCOUNTER — RX RENEWAL (OUTPATIENT)
Age: 69
End: 2019-07-31

## 2019-07-31 NOTE — INPATIENT CERTIFICATION FOR MEDICARE PATIENTS - PHYSICIAN CONCUR
Resolved symptomatology   I concur with the Admission Order and I certify that services are provided in accordance with Section 42 CFR § 412.3

## 2019-10-28 ENCOUNTER — RX RENEWAL (OUTPATIENT)
Age: 69
End: 2019-10-28

## 2020-01-06 ENCOUNTER — EMERGENCY (EMERGENCY)
Facility: HOSPITAL | Age: 70
LOS: 1 days | Discharge: HOME | End: 2020-01-06
Attending: EMERGENCY MEDICINE | Admitting: EMERGENCY MEDICINE
Payer: MEDICARE

## 2020-01-06 VITALS
TEMPERATURE: 98 F | RESPIRATION RATE: 17 BRPM | OXYGEN SATURATION: 99 % | DIASTOLIC BLOOD PRESSURE: 77 MMHG | HEART RATE: 74 BPM | SYSTOLIC BLOOD PRESSURE: 134 MMHG

## 2020-01-06 VITALS
SYSTOLIC BLOOD PRESSURE: 147 MMHG | DIASTOLIC BLOOD PRESSURE: 80 MMHG | RESPIRATION RATE: 18 BRPM | TEMPERATURE: 98 F | HEART RATE: 84 BPM | WEIGHT: 169.98 LBS | OXYGEN SATURATION: 99 %

## 2020-01-06 DIAGNOSIS — Z90.89 ACQUIRED ABSENCE OF OTHER ORGANS: Chronic | ICD-10-CM

## 2020-01-06 DIAGNOSIS — S43.004S UNSPECIFIED DISLOCATION OF RIGHT SHOULDER JOINT, SEQUELA: Chronic | ICD-10-CM

## 2020-01-06 DIAGNOSIS — Z90.49 ACQUIRED ABSENCE OF OTHER SPECIFIED PARTS OF DIGESTIVE TRACT: Chronic | ICD-10-CM

## 2020-01-06 LAB
APTT BLD: 42.6 SEC — HIGH (ref 28.5–37)
INR BLD: 2.31 RATIO — HIGH (ref 0.88–1.16)
PROTHROM AB SERPL-ACNC: 27 SEC — HIGH (ref 10–12.9)

## 2020-01-06 PROCEDURE — 85730 THROMBOPLASTIN TIME PARTIAL: CPT

## 2020-01-06 PROCEDURE — 99284 EMERGENCY DEPT VISIT MOD MDM: CPT | Mod: 25

## 2020-01-06 PROCEDURE — 85610 PROTHROMBIN TIME: CPT

## 2020-01-06 PROCEDURE — 73020 X-RAY EXAM OF SHOULDER: CPT

## 2020-01-06 PROCEDURE — 73030 X-RAY EXAM OF SHOULDER: CPT

## 2020-01-06 PROCEDURE — 99284 EMERGENCY DEPT VISIT MOD MDM: CPT

## 2020-01-06 PROCEDURE — 73020 X-RAY EXAM OF SHOULDER: CPT | Mod: 26,59,RT

## 2020-01-06 PROCEDURE — 73030 X-RAY EXAM OF SHOULDER: CPT | Mod: 26,RT

## 2020-01-06 PROCEDURE — 36415 COLL VENOUS BLD VENIPUNCTURE: CPT

## 2020-01-06 RX ORDER — OXYCODONE AND ACETAMINOPHEN 5; 325 MG/1; MG/1
2 TABLET ORAL ONCE
Refills: 0 | Status: DISCONTINUED | OUTPATIENT
Start: 2020-01-06 | End: 2020-01-06

## 2020-01-06 RX ADMIN — OXYCODONE AND ACETAMINOPHEN 2 TABLET(S): 5; 325 TABLET ORAL at 09:30

## 2020-01-06 RX ADMIN — OXYCODONE AND ACETAMINOPHEN 2 TABLET(S): 5; 325 TABLET ORAL at 10:30

## 2020-01-06 NOTE — ED PROVIDER NOTE - ENMT, MLM
Airway patent, Nasal mucosa clear. Mouth with normal mucosa. Throat has no vesicles, no oropharyngeal exudates and uvula is midline. head atraumatic.

## 2020-01-06 NOTE — ED ADULT NURSE NOTE - OBJECTIVE STATEMENT
Pt p/w R shoulder pain after trip and fall on cement. Per patient fell to side walking dog, denies any head trauma + thinners.

## 2020-01-06 NOTE — ED PROVIDER NOTE - PRINCIPAL DIAGNOSIS
Closed displaced fracture of greater tuberosity of humerus, unspecified laterality, initial encounter

## 2020-01-06 NOTE — ED PROVIDER NOTE - PATIENT PORTAL LINK FT
You can access the FollowMyHealth Patient Portal offered by [facility] by registering at the following website: [email or web address]. By joining [facility]’s FollowMyHealth portal, you will also be able to view your health information using other applications (apps) compatible with our system.

## 2020-01-06 NOTE — ED PROVIDER NOTE - CLINICAL SUMMARY MEDICAL DECISION MAKING FREE TEXT BOX
xray shows prox R humerus fracture, seen by ortho who does not think reduction is indicated.  will follow up with ortho as outpatient.

## 2020-01-06 NOTE — ED ADULT NURSE NOTE - CHPI ED NUR SYMPTOMS NEG
no fever/no abrasion/no tingling/no bleeding/no loss of consciousness/no numbness/no confusion/no vomiting

## 2020-01-06 NOTE — ED ADULT NURSE NOTE - PMH
Atrial fibrillation    CAD (coronary artery disease)  Stent 2012 [facility]  Diverticulitis    Dyslipidemia    Essential hypertension    MI (myocardial infarction)

## 2020-01-06 NOTE — ED PROVIDER NOTE - CARE PROVIDER_API CALL
Montse Noel (DO)  Orthopaedic Surgery Surgery  [address], [place] [address]  Phone: [phone]  Fax: [phone]  Follow Up Time:

## 2020-01-06 NOTE — ED PROVIDER NOTE - OBJECTIVE STATEMENT
pt s/p mechaincal pt s/p mechanical fall on uneven sidewalk, fell onto pt s/p mechanical fall on uneven sidewalk, fell onto R shoulder, c/o 8/10 pain with movement, no head trauma

## 2020-01-06 NOTE — ED PROVIDER NOTE - CARE PLAN
Principal Discharge DX:	Closed displaced fracture of greater tuberosity of humerus, unspecified laterality, initial encounter

## 2020-01-06 NOTE — CONSULT NOTE ADULT - SUBJECTIVE AND OBJECTIVE BOX
HPI: 69y year old Male RHD s/p mechanical fall today after tripping on the curb.  Landed on his right arm.  Patient had deformity and pain in right shoulder.  Patient denies pain in any other joint, numbness, tingling, paresthesias. No pain in any other extremities.  No Head trauma or LOC. On coumadin. Of note, hx of L proximal humerus frx that was treated nonop.     PMH:  Dyslipidemia  Essential hypertension  CAD (coronary artery disease)  Atrial fibrillation  MI (myocardial infarction)  Diverticulitis    PSH:  Dislocation of right shoulder joint, sequela  S/P tonsillectomy  S/P appendectomy      Allergies:  lactose (Unknown)  sulfa drugs (Rash)      O:  T(C): 36.5 (01-06-20 @ 09:02), Max: 36.5 (01-06-20 @ 09:02)  HR: 84 (01-06-20 @ 09:02) (84 - 84)  BP: 147/80 (01-06-20 @ 09:02) (147/80 - 147/80)  RR: 18 (01-06-20 @ 09:02) (18 - 18)  SpO2: 99% (01-06-20 @ 09:02) (99% - 99%)    Gen: NAD.  RUE:   Skin CDI  Moderate shoulder swelling.  AIN/PIN/U Intact  SILT M/U/R  Radial pulse palpable       Imaging: Displaced R proximal humerus fracture. Possible dislocation on trauma series.    A/P 69y year old man with R proximal humerus fracture, possible dislocation.     Will obtain axillary xr to rule out dislocation  Pain Control  NWB RUE in sling at all times.  Will DW attending to determine disposition. HPI: 69y year old Male RHD s/p mechanical fall today after tripping on the curb.  Landed on his right arm.  Patient had deformity and pain in right shoulder.  Patient denies pain in any other joint, numbness, tingling, paresthesias. No pain in any other extremities.  No Head trauma or LOC. On coumadin. Of note, hx of L proximal humerus frx that was treated nonop.     PMH:  Dyslipidemia  Essential hypertension  CAD (coronary artery disease)  Atrial fibrillation  MI (myocardial infarction)  Diverticulitis    PSH:  Dislocation of right shoulder joint, sequela  S/P tonsillectomy  S/P appendectomy      Allergies:  lactose (Unknown)  sulfa drugs (Rash)      O:  T(C): 36.5 (01-06-20 @ 09:02), Max: 36.5 (01-06-20 @ 09:02)  HR: 84 (01-06-20 @ 09:02) (84 - 84)  BP: 147/80 (01-06-20 @ 09:02) (147/80 - 147/80)  RR: 18 (01-06-20 @ 09:02) (18 - 18)  SpO2: 99% (01-06-20 @ 09:02) (99% - 99%)    Gen: NAD.  RUE:   Skin CDI  Moderate shoulder swelling.  AIN/PIN/U Intact  SILT M/U/R  Radial pulse palpable       Imaging: Displaced R proximal humerus fracture. Possible dislocation on trauma series.    A/P 69y year old man with R proximal humerus fracture, possible dislocation.     Axillary XR demonstrates no GH joint dislocations  Pain Control  NWB RUE in sling at all times.  Pt is to Follow up with Dr. Noel or [facility] orthopedics own orthopedist in 1-2 weeks. Discussed with patient possible need for surgical procedure, this can be further discussed as outpatient.   Analgesia  no further ortho intervention   ortho stable for DC

## 2020-01-06 NOTE — ED PROVIDER NOTE - NSFOLLOWUPINSTRUCTIONS_ED_ALL_ED_FT
-- Follow up with orthopedics as discussed.    -- Return to the ER for worsening or persistent symptoms, and/or ANY NEW OR CONCERNING SYMPTOMS.    -- If you have difficulty following up, please call: [phone] to obtain a [facility] doctor or specialist who takes your insurance in your area.

## 2020-01-06 NOTE — ED PROVIDER NOTE - PMH
Atrial fibrillation    CAD (coronary artery disease)  Stent 2012 [facility]  Diverticulitis    Dyslipidemia    Essential hypertension    MI (myocardial infarction) Atrial fibrillation    CAD (coronary artery disease)  Stent 2012 [facility]  Diverticulitis    Dyslipidemia    Essential hypertension    MI (myocardial infarction)

## 2020-01-21 ENCOUNTER — RX RENEWAL (OUTPATIENT)
Age: 70
End: 2020-01-21

## 2020-06-22 ENCOUNTER — RX RENEWAL (OUTPATIENT)
Age: 70
End: 2020-06-22

## 2020-08-13 ENCOUNTER — APPOINTMENT (OUTPATIENT)
Dept: INTERNAL MEDICINE | Facility: CLINIC | Age: 70
End: 2020-08-13
Payer: MEDICARE

## 2020-08-13 VITALS — DIASTOLIC BLOOD PRESSURE: 80 MMHG | HEART RATE: 7 BPM | SYSTOLIC BLOOD PRESSURE: 130 MMHG

## 2020-08-13 VITALS
BODY MASS INDEX: 23.27 KG/M2 | DIASTOLIC BLOOD PRESSURE: 92 MMHG | TEMPERATURE: 98.2 F | SYSTOLIC BLOOD PRESSURE: 150 MMHG | RESPIRATION RATE: 12 BRPM | HEART RATE: 77 BPM | HEIGHT: 71 IN | OXYGEN SATURATION: 97 % | WEIGHT: 166.25 LBS

## 2020-08-13 PROCEDURE — 99214 OFFICE O/P EST MOD 30 MIN: CPT

## 2020-08-17 ENCOUNTER — APPOINTMENT (OUTPATIENT)
Dept: INTERNAL MEDICINE | Facility: CLINIC | Age: 70
End: 2020-08-17
Payer: MEDICARE

## 2020-08-17 VITALS
OXYGEN SATURATION: 98 % | WEIGHT: 166 LBS | SYSTOLIC BLOOD PRESSURE: 109 MMHG | TEMPERATURE: 98.7 F | HEART RATE: 73 BPM | DIASTOLIC BLOOD PRESSURE: 73 MMHG | HEIGHT: 70 IN | BODY MASS INDEX: 23.77 KG/M2

## 2020-08-17 DIAGNOSIS — K57.32 DIVERTICULITIS OF LARGE INTESTINE W/OUT PERFORATION OR ABSCESS W/OUT BLEEDING: ICD-10-CM

## 2020-08-17 DIAGNOSIS — I71.4 ABDOMINAL AORTIC ANEURYSM, W/OUT RUPTURE: ICD-10-CM

## 2020-08-17 PROCEDURE — 99213 OFFICE O/P EST LOW 20 MIN: CPT

## 2020-08-17 NOTE — PLAN
[FreeTextEntry1] : COMPLETE ANTIBIOTICS \par CONT BLAND DIET X 1 WEEK \par ADVISED F/U W/ VASCULAR \par F/U IN 3 MONTHS

## 2020-08-17 NOTE — PHYSICAL EXAM
[No Acute Distress] : no acute distress [No Lymphadenopathy] : no lymphadenopathy [Normal] : normal rate, regular rhythm, normal S1 and S2 and no murmur heard [Soft] : abdomen soft [Non Tender] : non-tender [Non-distended] : non-distended [Normal Bowel Sounds] : normal bowel sounds [No Masses] : no abdominal mass palpated

## 2020-08-17 NOTE — HISTORY OF PRESENT ILLNESS
[FreeTextEntry1] : FOLLOW UP OF DIVERTICULITIS  [de-identified] : LLQ DIVERTICULITIS ,  ABDOMINAL PAIN HAS RESOLVED , CT SCAN ABDOMEN REVEALS DIVERTICULOSIS , NO DIVERTICULITIS , 3.1 CM AAA , NO FEVER , TOLERATING BLAND DIET

## 2020-11-17 ENCOUNTER — APPOINTMENT (OUTPATIENT)
Dept: INTERNAL MEDICINE | Facility: CLINIC | Age: 70
End: 2020-11-17

## 2021-04-29 ENCOUNTER — APPOINTMENT (OUTPATIENT)
Dept: DERMATOLOGY | Facility: CLINIC | Age: 71
End: 2021-04-29
Payer: MEDICARE

## 2021-04-29 VITALS — WEIGHT: 180 LBS | BODY MASS INDEX: 25.77 KG/M2 | HEIGHT: 70 IN

## 2021-04-29 DIAGNOSIS — L82.0 INFLAMED SEBORRHEIC KERATOSIS: ICD-10-CM

## 2021-04-29 DIAGNOSIS — D18.01 HEMANGIOMA OF SKIN AND SUBCUTANEOUS TISSUE: ICD-10-CM

## 2021-04-29 DIAGNOSIS — Z12.83 ENCOUNTER FOR SCREENING FOR MALIGNANT NEOPLASM OF SKIN: ICD-10-CM

## 2021-04-29 DIAGNOSIS — L82.1 OTHER SEBORRHEIC KERATOSIS: ICD-10-CM

## 2021-04-29 DIAGNOSIS — B07.8 OTHER VIRAL WARTS: ICD-10-CM

## 2021-04-29 PROCEDURE — 17110 DESTRUCTION B9 LES UP TO 14: CPT

## 2021-04-29 PROCEDURE — 99203 OFFICE O/P NEW LOW 30 MIN: CPT | Mod: 25

## 2021-06-01 ENCOUNTER — APPOINTMENT (OUTPATIENT)
Dept: SURGERY | Facility: CLINIC | Age: 71
End: 2021-06-01
Payer: MEDICARE

## 2021-06-01 VITALS
DIASTOLIC BLOOD PRESSURE: 98 MMHG | SYSTOLIC BLOOD PRESSURE: 147 MMHG | HEART RATE: 83 BPM | WEIGHT: 175 LBS | BODY MASS INDEX: 25.05 KG/M2 | TEMPERATURE: 97.9 F | OXYGEN SATURATION: 98 % | HEIGHT: 70 IN

## 2021-06-01 DIAGNOSIS — Z82.49 FAMILY HISTORY OF ISCHEMIC HEART DISEASE AND OTHER DISEASES OF THE CIRCULATORY SYSTEM: ICD-10-CM

## 2021-06-01 DIAGNOSIS — K40.90 UNILATERAL INGUINAL HERNIA, W/OUT OBSTRUCTION OR GANGRENE, NOT SPECIFIED AS RECURRENT: ICD-10-CM

## 2021-06-01 PROCEDURE — 99214 OFFICE O/P EST MOD 30 MIN: CPT

## 2021-06-01 PROCEDURE — 99204 OFFICE O/P NEW MOD 45 MIN: CPT

## 2021-06-01 NOTE — HISTORY OF PRESENT ILLNESS
[de-identified] : right inguinal hernia [de-identified] : 71 year old white male who presents c/o a right inguinal hernia that ' pops out'. He has soreness in the area without extreme pain. He denies any nausea or vomiting or changes in his bowel habits.

## 2021-06-01 NOTE — REVIEW OF SYSTEMS
[Negative] : Heme/Lymph [FreeTextEntry5] : had MI and cardiac stent 2012 [FreeTextEntry9] : hx of back problems

## 2021-06-01 NOTE — PHYSICAL EXAM
[JVD] : no jugular venous distention  [Normal Thyroid] : the thyroid was normal [Carotid Bruits] : no carotid bruits [Normal Breath Sounds] : Normal breath sounds [Normal Heart Sounds] : normal heart sounds [Normal Rate and Rhythm] : normal rate and rhythm [No Rash or Lesion] : No rash or lesion [Alert] : alert [Oriented to Person] : oriented to person [Oriented to Place] : oriented to place [Oriented to Time] : oriented to time [Calm] : calm [de-identified] : well developed white male in no acute distress [de-identified] : noninjected and nonicteris [de-identified] : without adenopathy [de-identified] : normal bowel sounds, without distension or tenderness. [de-identified] : normal testicles, with right inguinal hernia, left side is normal  [de-identified] : just done by Urologist [de-identified] : without calf pain or swelling.

## 2021-06-01 NOTE — ASSESSMENT
[FreeTextEntry1] : I have explained the signs and symptoms of incarceration and strangulation with Mr Patton and the importance of calling immediately if they developed. I have also called pts cardiologist to discuss d/cing his coumadin for the procedure and if any bridging would be required. I have also called to discuss if any further cardiac workup needs to be done Prior to elective surgery.

## 2021-06-04 NOTE — DISCHARGE NOTE ADULT - NS AS ACTIVITY OBS
Detail Level: Detailed Quality 265: Biopsy Follow-Up: Biopsy results reviewed, communicated, tracked, and documented Showering allowed/Walking-Indoors allowed Walking-Indoors allowed/No Heavy lifting/straining/Showering allowed/Walking-Outdoors allowed

## 2021-06-16 ENCOUNTER — NON-APPOINTMENT (OUTPATIENT)
Age: 71
End: 2021-06-16

## 2021-06-28 ENCOUNTER — OUTPATIENT (OUTPATIENT)
Dept: OUTPATIENT SERVICES | Facility: HOSPITAL | Age: 71
LOS: 1 days | End: 2021-06-28
Payer: MEDICARE

## 2021-06-28 VITALS
HEIGHT: 70 IN | WEIGHT: 177.03 LBS | TEMPERATURE: 98 F | HEART RATE: 78 BPM | OXYGEN SATURATION: 99 % | SYSTOLIC BLOOD PRESSURE: 140 MMHG | DIASTOLIC BLOOD PRESSURE: 88 MMHG | RESPIRATION RATE: 17 BRPM

## 2021-06-28 DIAGNOSIS — Z96.611 PRESENCE OF RIGHT ARTIFICIAL SHOULDER JOINT: Chronic | ICD-10-CM

## 2021-06-28 DIAGNOSIS — K40.90 UNILATERAL INGUINAL HERNIA, WITHOUT OBSTRUCTION OR GANGRENE, NOT SPECIFIED AS RECURRENT: ICD-10-CM

## 2021-06-28 DIAGNOSIS — Z01.818 ENCOUNTER FOR OTHER PREPROCEDURAL EXAMINATION: ICD-10-CM

## 2021-06-28 DIAGNOSIS — Z90.49 ACQUIRED ABSENCE OF OTHER SPECIFIED PARTS OF DIGESTIVE TRACT: Chronic | ICD-10-CM

## 2021-06-28 DIAGNOSIS — S43.004S UNSPECIFIED DISLOCATION OF RIGHT SHOULDER JOINT, SEQUELA: Chronic | ICD-10-CM

## 2021-06-28 DIAGNOSIS — Z90.89 ACQUIRED ABSENCE OF OTHER ORGANS: Chronic | ICD-10-CM

## 2021-06-28 LAB
ALBUMIN SERPL ELPH-MCNC: 4 G/DL — SIGNIFICANT CHANGE UP (ref 3.3–5)
ALP SERPL-CCNC: 76 U/L — SIGNIFICANT CHANGE UP (ref 40–120)
ALT FLD-CCNC: 44 U/L — SIGNIFICANT CHANGE UP (ref 12–78)
ANION GAP SERPL CALC-SCNC: 5 MMOL/L — SIGNIFICANT CHANGE UP (ref 5–17)
APPEARANCE UR: CLEAR — SIGNIFICANT CHANGE UP
APTT BLD: 42 SEC — HIGH (ref 27.5–35.5)
AST SERPL-CCNC: 31 U/L — SIGNIFICANT CHANGE UP (ref 15–37)
BILIRUB SERPL-MCNC: 0.9 MG/DL — SIGNIFICANT CHANGE UP (ref 0.2–1.2)
BILIRUB UR-MCNC: NEGATIVE — SIGNIFICANT CHANGE UP
BUN SERPL-MCNC: 17 MG/DL — SIGNIFICANT CHANGE UP (ref 7–23)
CALCIUM SERPL-MCNC: 8.9 MG/DL — SIGNIFICANT CHANGE UP (ref 8.5–10.1)
CHLORIDE SERPL-SCNC: 108 MMOL/L — SIGNIFICANT CHANGE UP (ref 96–108)
CO2 SERPL-SCNC: 31 MMOL/L — SIGNIFICANT CHANGE UP (ref 22–31)
COLOR SPEC: YELLOW — SIGNIFICANT CHANGE UP
CREAT SERPL-MCNC: 0.89 MG/DL — SIGNIFICANT CHANGE UP (ref 0.5–1.3)
DIFF PNL FLD: NEGATIVE — SIGNIFICANT CHANGE UP
GLUCOSE SERPL-MCNC: 106 MG/DL — HIGH (ref 70–99)
GLUCOSE UR QL: NEGATIVE — SIGNIFICANT CHANGE UP
HCT VFR BLD CALC: 39.8 % — SIGNIFICANT CHANGE UP (ref 39–50)
HGB BLD-MCNC: 13.6 G/DL — SIGNIFICANT CHANGE UP (ref 13–17)
INR BLD: 2.06 RATIO — HIGH (ref 0.88–1.16)
KETONES UR-MCNC: NEGATIVE — SIGNIFICANT CHANGE UP
LEUKOCYTE ESTERASE UR-ACNC: ABNORMAL
MCHC RBC-ENTMCNC: 30.4 PG — SIGNIFICANT CHANGE UP (ref 27–34)
MCHC RBC-ENTMCNC: 34.2 GM/DL — SIGNIFICANT CHANGE UP (ref 32–36)
MCV RBC AUTO: 88.8 FL — SIGNIFICANT CHANGE UP (ref 80–100)
NITRITE UR-MCNC: NEGATIVE — SIGNIFICANT CHANGE UP
NRBC # BLD: 0 /100 WBCS — SIGNIFICANT CHANGE UP (ref 0–0)
PH UR: 8 — SIGNIFICANT CHANGE UP (ref 5–8)
PLATELET # BLD AUTO: 107 K/UL — LOW (ref 150–400)
POTASSIUM SERPL-MCNC: 3.7 MMOL/L — SIGNIFICANT CHANGE UP (ref 3.5–5.3)
POTASSIUM SERPL-SCNC: 3.7 MMOL/L — SIGNIFICANT CHANGE UP (ref 3.5–5.3)
PROT SERPL-MCNC: 7.7 G/DL — SIGNIFICANT CHANGE UP (ref 6–8.3)
PROT UR-MCNC: 15
PROTHROM AB SERPL-ACNC: 23.3 SEC — HIGH (ref 10.6–13.6)
RBC # BLD: 4.48 M/UL — SIGNIFICANT CHANGE UP (ref 4.2–5.8)
RBC # FLD: 13.5 % — SIGNIFICANT CHANGE UP (ref 10.3–14.5)
SODIUM SERPL-SCNC: 144 MMOL/L — SIGNIFICANT CHANGE UP (ref 135–145)
SP GR SPEC: 1.01 — SIGNIFICANT CHANGE UP (ref 1.01–1.02)
UROBILINOGEN FLD QL: 1
WBC # BLD: 5.34 K/UL — SIGNIFICANT CHANGE UP (ref 3.8–10.5)
WBC # FLD AUTO: 5.34 K/UL — SIGNIFICANT CHANGE UP (ref 3.8–10.5)

## 2021-06-28 PROCEDURE — 93010 ELECTROCARDIOGRAM REPORT: CPT

## 2021-06-28 PROCEDURE — 81001 URINALYSIS AUTO W/SCOPE: CPT

## 2021-06-28 PROCEDURE — 85730 THROMBOPLASTIN TIME PARTIAL: CPT

## 2021-06-28 PROCEDURE — G0463: CPT

## 2021-06-28 PROCEDURE — 36415 COLL VENOUS BLD VENIPUNCTURE: CPT

## 2021-06-28 PROCEDURE — 85610 PROTHROMBIN TIME: CPT

## 2021-06-28 PROCEDURE — 80053 COMPREHEN METABOLIC PANEL: CPT

## 2021-06-28 PROCEDURE — 85027 COMPLETE CBC AUTOMATED: CPT

## 2021-06-28 PROCEDURE — 87086 URINE CULTURE/COLONY COUNT: CPT

## 2021-06-28 PROCEDURE — 93005 ELECTROCARDIOGRAM TRACING: CPT

## 2021-06-28 RX ORDER — ATORVASTATIN CALCIUM 80 MG/1
1 TABLET, FILM COATED ORAL
Qty: 0 | Refills: 0 | DISCHARGE

## 2021-06-28 RX ORDER — ALFUZOSIN HYDROCHLORIDE 10 MG/1
1 TABLET, EXTENDED RELEASE ORAL
Qty: 0 | Refills: 0 | DISCHARGE

## 2021-06-28 NOTE — H&P PST ADULT - NSICDXPASTSURGICALHX_GEN_ALL_CORE_FT
PAST SURGICAL HISTORY:  Dislocation of right shoulder joint, sequela s/p surgery yrs ago    S/P appendectomy     S/P shoulder replacement, right 1/28/20    S/P tonsillectomy

## 2021-06-28 NOTE — H&P PST ADULT - NSICDXFAMILYHX_GEN_ALL_CORE_FT
FAMILY HISTORY:  Father  Still living? Unknown  Family history of myocardial infarction, Age at diagnosis: Age Unknown    Uncle  Still living? Unknown  Family history of prostate cancer, Age at diagnosis: Age Unknown

## 2021-06-28 NOTE — H&P PST ADULT - GIT GEN HX ROS MEA POS PC
admits to bleeding hemorrhoid/nausea/constipation/abdominal pain/BRPBPR see HPI/constipation/abdominal pain/BRPBPR

## 2021-06-28 NOTE — H&P PST ADULT - NSICDXPROBLEM_GEN_ALL_CORE_FT
PROBLEM DIAGNOSES  Problem: Unilateral inguinal hernia without obstruction or gangrene, recurrence not specified  Assessment and Plan: PST: CBC,CMP,EKG,U/A, urine culture   Medical evaluation with Dr. Marsh  Cardiology clearance with Dr Garza  All preoperative instructions including CHD cleanse reviewed with patient who verbalized understanding.   Avoid all NSAID/ASA containing products as well as all herbal/vitamin supplements. Tylenol only for pain/headache.   Fully vaccinated; Denies recent international travel, recent illness and sick contact with COVID in the last 3 weeks

## 2021-06-28 NOTE — H&P PST ADULT - RS GEN PE MLT RESP DETAILS PC
normal/breath sounds equal/good air movement/clear to auscultation bilaterally/no rales/no rhonchi/no wheezes

## 2021-06-28 NOTE — H&P PST ADULT - ASSESSMENT
This 70 yo male with a PMHX of CAD, Afib HLD, Diverticulitis AAA, Pancreatitis  presents to PST for a scheduled repair  right inguinal hernia with mesh insertion pain pump  with Dr Patel  on 07/09/2021 .

## 2021-06-28 NOTE — H&P PST ADULT - NSICDXPASTMEDICALHX_GEN_ALL_CORE_FT
PAST MEDICAL HISTORY:  Atrial fibrillation     CAD (coronary artery disease) Stent 2012 [facility]    Diverticulitis     Dyslipidemia     Essential hypertension     MI (myocardial infarction)

## 2021-06-28 NOTE — H&P PST ADULT - PRIMARY CARE PROVIDER
Mr. Coates was contacted today per patient's request.  The patient had seen Dr. Heredia last week and after further discussion the patient has decided to undergo external beam radiation therapy.  I discussed this at some length with the patient today and the patient did not want to undergo any further procedures in which he will need to get off his Plavix or go to sleep and therefore this will rule out a permanent prostate seed implant.  The patient also did not want SpaceOAR placed for his radiotherapy preferring to use a rectal balloon daily.  The patient also indicated that he would prefer not to undergo any endocrine therapy given the lower risk nature of his prostate cancer at this time.    The patient has had 4 Negative COVID19 tests, the last in early February.  Therefore I will not test him further for COVID-19 prior to radiation therapy.  The patient will be contacted to schedule radiotherapy planning and simulation this week or next.   Fitterman

## 2021-06-28 NOTE — H&P PST ADULT - GASTROINTESTINAL DETAILS
soft/no distention/no masses palpable/no bruit/no guarding/no rigidity/no organomegaly soft/nontender/no distention/no masses palpable/bowel sounds normal/no organomegaly

## 2021-06-28 NOTE — H&P PST ADULT - NSANTHOSAYNRD_GEN_A_CORE
No. VIOLETTE screening performed.  STOP BANG Legend: 0-2 = LOW Risk; 3-4 = INTERMEDIATE Risk; 5-8 = HIGH Risk

## 2021-06-28 NOTE — H&P PST ADULT - HISTORY OF PRESENT ILLNESS
This 70 yo male with a PMHX of CAD, Afib HLD, Diverticulitis AAA, Pancreatitis  presents to PST for a scheduled repair  right inguinal hernia with mesh insertion pain pump  with Dr Patel  on 07/09/2021 .      This 72 yo male with a PMHX of CAD, Afib HLD, Diverticulitis AAA, Pancreatitis  presents to [facility] for a scheduled repair  right inguinal hernia with mesh insertion pain pump  with Dr Patel  on 07/09/2021 . Pt developed pain and a "lump" in his right groin 3 months ago. He is electing to have this procedure. He denies any fever, chills, abdominal pain, N/V.

## 2021-06-28 NOTE — H&P PST ADULT - NEUROLOGICAL DETAILS
alert and oriented x 3/sensation intact/normal strength alert and oriented x 3/responds to verbal commands/normal strength

## 2021-06-29 LAB
CULTURE RESULTS: NO GROWTH — SIGNIFICANT CHANGE UP
SPECIMEN SOURCE: SIGNIFICANT CHANGE UP

## 2021-07-02 ENCOUNTER — APPOINTMENT (OUTPATIENT)
Dept: INTERNAL MEDICINE | Facility: CLINIC | Age: 71
End: 2021-07-02
Payer: MEDICARE

## 2021-07-02 VITALS
SYSTOLIC BLOOD PRESSURE: 136 MMHG | HEIGHT: 70 IN | DIASTOLIC BLOOD PRESSURE: 83 MMHG | TEMPERATURE: 99.1 F | WEIGHT: 180 LBS | BODY MASS INDEX: 25.77 KG/M2

## 2021-07-02 VITALS — SYSTOLIC BLOOD PRESSURE: 120 MMHG | DIASTOLIC BLOOD PRESSURE: 80 MMHG

## 2021-07-02 DIAGNOSIS — Z01.818 ENCOUNTER FOR OTHER PREPROCEDURAL EXAMINATION: ICD-10-CM

## 2021-07-02 DIAGNOSIS — N13.8 BENIGN PROSTATIC HYPERPLASIA WITH LOWER URINARY TRACT SYMPMS: ICD-10-CM

## 2021-07-02 DIAGNOSIS — D69.6 THROMBOCYTOPENIA, UNSPECIFIED: ICD-10-CM

## 2021-07-02 DIAGNOSIS — I48.91 UNSPECIFIED ATRIAL FIBRILLATION: ICD-10-CM

## 2021-07-02 DIAGNOSIS — N40.1 BENIGN PROSTATIC HYPERPLASIA WITH LOWER URINARY TRACT SYMPMS: ICD-10-CM

## 2021-07-02 PROCEDURE — 36415 COLL VENOUS BLD VENIPUNCTURE: CPT

## 2021-07-02 PROCEDURE — 99214 OFFICE O/P EST MOD 30 MIN: CPT | Mod: 25

## 2021-07-02 RX ORDER — METOPROLOL SUCCINATE 200 MG/1
200 TABLET, EXTENDED RELEASE ORAL
Qty: 90 | Refills: 0 | Status: ACTIVE | COMMUNITY

## 2021-07-02 RX ORDER — ATORVASTATIN CALCIUM 40 MG/1
40 TABLET, FILM COATED ORAL
Qty: 30 | Refills: 2 | Status: ACTIVE | COMMUNITY

## 2021-07-02 RX ORDER — ASPIRIN 81 MG
81 TABLET, DELAYED RELEASE (ENTERIC COATED) ORAL DAILY
Refills: 0 | Status: ACTIVE | COMMUNITY

## 2021-07-02 RX ORDER — METRONIDAZOLE 500 MG/1
500 TABLET ORAL 3 TIMES DAILY
Qty: 30 | Refills: 0 | Status: DISCONTINUED | COMMUNITY
Start: 2020-08-13 | End: 2021-07-02

## 2021-07-02 RX ORDER — TAMSULOSIN HYDROCHLORIDE 0.4 MG/1
0.4 CAPSULE ORAL
Qty: 90 | Refills: 0 | Status: ACTIVE | COMMUNITY

## 2021-07-02 RX ORDER — ATORVASTATIN CALCIUM 40 MG/1
40 TABLET, FILM COATED ORAL
Refills: 0 | Status: DISCONTINUED | COMMUNITY
End: 2021-07-02

## 2021-07-02 RX ORDER — DIGOXIN 125 UG/1
125 TABLET ORAL DAILY
Qty: 90 | Refills: 0 | Status: ACTIVE | COMMUNITY

## 2021-07-02 RX ORDER — CIPROFLOXACIN HYDROCHLORIDE 500 MG/1
500 TABLET, FILM COATED ORAL TWICE DAILY
Qty: 20 | Refills: 0 | Status: DISCONTINUED | COMMUNITY
Start: 2020-08-13 | End: 2021-07-02

## 2021-07-02 NOTE — REVIEW OF SYSTEMS
[Negative] : Heme/Lymph [Chest Pain] : no chest pain [Palpitations] : no palpitations [Orthopena] : no orthopnea [Paroxysmal Nocturnal Dyspnea] : no paroxysmal nocturnal dyspnea [Dysuria] : no dysuria [Hematuria] : no hematuria [Headache] : no headache [Dizziness] : no dizziness

## 2021-07-02 NOTE — PHYSICAL EXAM
[No Acute Distress] : no acute distress [PERRL] : pupils equal round and reactive to light [EOMI] : extraocular movements intact [Normal Outer Ear/Nose] : the outer ears and nose were normal in appearance [Normal Oropharynx] : the oropharynx was normal [No Lymphadenopathy] : no lymphadenopathy [Thyroid Normal, No Nodules] : the thyroid was normal and there were no nodules present [No Carotid Bruits] : no carotid bruits [No Abdominal Bruit] : a ~M bruit was not heard ~T in the abdomen [No Edema] : there was no peripheral edema [Normal] : soft, non-tender, non-distended, no masses palpated, no HSM and normal bowel sounds [Normal Supraclavicular Nodes] : no supraclavicular lymphadenopathy [Normal Posterior Cervical Nodes] : no posterior cervical lymphadenopathy [Normal Anterior Cervical Nodes] : no anterior cervical lymphadenopathy [Normal Inguinal Nodes] : no inguinal lymphadenopathy [No CVA Tenderness] : no CVA  tenderness [No Focal Deficits] : no focal deficits [Normal Gait] : normal gait [Normal Affect] : the affect was normal [Normal Insight/Judgement] : insight and judgment were intact [de-identified] : DECREASED ROM C- SPINE

## 2021-07-02 NOTE — HISTORY OF PRESENT ILLNESS
[Atrial Fibrillation] : atrial fibrillation [Coronary Artery Disease] : coronary artery disease [No Pertinent Pulmonary History] : no history of asthma, COPD, sleep apnea, or smoking [No Adverse Anesthesia Reaction] : no adverse anesthesia reaction in self or family member [Chronic Anticoagulation] : chronic anticoagulation [(Patient denies any chest pain, claudication, dyspnea on exertion, orthopnea, palpitations or syncope)] : Patient denies any chest pain, claudication, dyspnea on exertion, orthopnea, palpitations or syncope [Aortic Stenosis] : no aortic stenosis [Recent Myocardial Infarction] : no recent myocardial infarction [Implantable Device/Pacemaker] : no implantable device/pacemaker [Chronic Kidney Disease] : no chronic kidney disease [Diabetes] : no diabetes [FreeTextEntry1] : ELECTIVE RIH REPAIR  [FreeTextEntry2] : 7/9/2021 [FreeTextEntry3] : DR. BRITTA RITCHIE  [FreeTextEntry4] : 1. RIH REPAIR \par 2. CAD : DENEIS ANY CHEST PAINS , DYSPNEA , S/P STENT X1 TO LAD 7/2012 , CARDIOLOGY CLEARANCE PENDING \par 3. HLD \par 4. LOW PLATELETS : PROBABLY 2/2 LOW GRADE ITP \par 5. A FIB

## 2021-07-02 NOTE — ASSESSMENT
[Patient Optimized for Surgery] : Patient optimized for surgery [No Further Testing Recommended] : no further testing recommended [Other: _____] : [unfilled] [Modify anticoagulant treatment prior to procedure] : Modify anticoagulant treatment prior to procedure [Continue medications as is] : Continue current medications [As per surgery] : as per surgery [FreeTextEntry4] : PATIENT IN MOST OPTIMAL MEDICAL CONDITION FOR PROPOSED PROCEDURE , HE WILL HOLD WARFARIN FOR 5 DAYS PRIOR TO PROCEDURE , CAN CONTINUE \par ECOTRIN , AWAIT CARDIOLOGY CONSULT AND CLEARANCE  [FreeTextEntry5] : HOLD X 5 DAYS PRIOR TO SURGERY  [FreeTextEntry7] : CONT METOPROLOL , ECOTRIN , DIGOXIN

## 2021-07-03 LAB
BASOPHILS # BLD AUTO: 0.02 K/UL
BASOPHILS NFR BLD AUTO: 0.4 %
EOSINOPHIL # BLD AUTO: 0.07 K/UL
EOSINOPHIL NFR BLD AUTO: 1.3 %
HCT VFR BLD CALC: 40.6 %
HGB BLD-MCNC: 13.3 G/DL
IMM GRANULOCYTES NFR BLD AUTO: 0.2 %
LYMPHOCYTES # BLD AUTO: 1.47 K/UL
LYMPHOCYTES NFR BLD AUTO: 27.2 %
MAN DIFF?: NORMAL
MCHC RBC-ENTMCNC: 30 PG
MCHC RBC-ENTMCNC: 32.8 GM/DL
MCV RBC AUTO: 91.4 FL
MONOCYTES # BLD AUTO: 0.44 K/UL
MONOCYTES NFR BLD AUTO: 8.1 %
NEUTROPHILS # BLD AUTO: 3.39 K/UL
NEUTROPHILS NFR BLD AUTO: 62.8 %
PLATELET # BLD AUTO: 116 K/UL
RBC # BLD: 4.44 M/UL
RBC # FLD: 13.4 %
WBC # FLD AUTO: 5.4 K/UL

## 2021-07-08 ENCOUNTER — TRANSCRIPTION ENCOUNTER (OUTPATIENT)
Age: 71
End: 2021-07-08

## 2021-07-08 NOTE — ASU PATIENT PROFILE, ADULT - PSH
Dislocation of right shoulder joint, sequela  s/p surgery yrs ago  S/P appendectomy    S/P shoulder replacement, right  1/28/20  S/P tonsillectomy

## 2021-07-08 NOTE — ASU PATIENT PROFILE, ADULT - PMH
Atrial fibrillation    CAD (coronary artery disease)  Stent 2012 [facility]  Diverticulitis    Dyslipidemia    Essential hypertension    MI (myocardial infarction)

## 2021-07-09 ENCOUNTER — APPOINTMENT (OUTPATIENT)
Dept: SURGERY | Facility: HOSPITAL | Age: 71
End: 2021-07-09

## 2021-07-09 ENCOUNTER — OUTPATIENT (OUTPATIENT)
Dept: OUTPATIENT SERVICES | Facility: HOSPITAL | Age: 71
LOS: 1 days | End: 2021-07-09
Payer: MEDICARE

## 2021-07-09 VITALS
TEMPERATURE: 98 F | SYSTOLIC BLOOD PRESSURE: 178 MMHG | WEIGHT: 177.03 LBS | HEART RATE: 88 BPM | RESPIRATION RATE: 16 BRPM | DIASTOLIC BLOOD PRESSURE: 95 MMHG | HEIGHT: 70 IN | OXYGEN SATURATION: 98 %

## 2021-07-09 VITALS
DIASTOLIC BLOOD PRESSURE: 76 MMHG | OXYGEN SATURATION: 97 % | HEART RATE: 69 BPM | RESPIRATION RATE: 12 BRPM | SYSTOLIC BLOOD PRESSURE: 132 MMHG

## 2021-07-09 DIAGNOSIS — K40.90 UNILATERAL INGUINAL HERNIA, WITHOUT OBSTRUCTION OR GANGRENE, NOT SPECIFIED AS RECURRENT: ICD-10-CM

## 2021-07-09 DIAGNOSIS — Z90.49 ACQUIRED ABSENCE OF OTHER SPECIFIED PARTS OF DIGESTIVE TRACT: Chronic | ICD-10-CM

## 2021-07-09 DIAGNOSIS — Z01.818 ENCOUNTER FOR OTHER PREPROCEDURAL EXAMINATION: ICD-10-CM

## 2021-07-09 DIAGNOSIS — S43.004S UNSPECIFIED DISLOCATION OF RIGHT SHOULDER JOINT, SEQUELA: Chronic | ICD-10-CM

## 2021-07-09 DIAGNOSIS — Z96.611 PRESENCE OF RIGHT ARTIFICIAL SHOULDER JOINT: Chronic | ICD-10-CM

## 2021-07-09 DIAGNOSIS — Z90.89 ACQUIRED ABSENCE OF OTHER ORGANS: Chronic | ICD-10-CM

## 2021-07-09 LAB
APTT BLD: 33.6 SEC — SIGNIFICANT CHANGE UP (ref 27.5–35.5)
INR BLD: 1.19 RATIO — HIGH (ref 0.88–1.16)
PROTHROM AB SERPL-ACNC: 13.8 SEC — HIGH (ref 10.6–13.6)

## 2021-07-09 PROCEDURE — 49505 PRP I/HERN INIT REDUC >5 YR: CPT | Mod: RT

## 2021-07-09 PROCEDURE — 36415 COLL VENOUS BLD VENIPUNCTURE: CPT

## 2021-07-09 PROCEDURE — 11981 INSERTION DRUG DLVR IMPLANT: CPT

## 2021-07-09 PROCEDURE — 55520 REMOVAL OF SPERM CORD LESION: CPT | Mod: AS,59

## 2021-07-09 PROCEDURE — 55520 REMOVAL OF SPERM CORD LESION: CPT | Mod: 59

## 2021-07-09 PROCEDURE — 49505 PRP I/HERN INIT REDUC >5 YR: CPT | Mod: AS,RT

## 2021-07-09 PROCEDURE — 85610 PROTHROMBIN TIME: CPT

## 2021-07-09 PROCEDURE — 85730 THROMBOPLASTIN TIME PARTIAL: CPT

## 2021-07-09 PROCEDURE — 55520 REMOVAL OF SPERM CORD LESION: CPT

## 2021-07-09 PROCEDURE — C1781: CPT

## 2021-07-09 RX ORDER — CEFAZOLIN SODIUM 1 G
2000 VIAL (EA) INJECTION ONCE
Refills: 0 | Status: COMPLETED | OUTPATIENT
Start: 2021-07-09 | End: 2021-07-09

## 2021-07-09 RX ORDER — BUPIVACAINE HCL/PF 7.5 MG/ML
270 VIAL (ML) INJECTION
Refills: 0 | Status: DISCONTINUED | OUTPATIENT
Start: 2021-07-09 | End: 2021-07-09

## 2021-07-09 RX ORDER — DOCUSATE SODIUM 100 MG
1 CAPSULE ORAL
Qty: 25 | Refills: 0
Start: 2021-07-09

## 2021-07-09 RX ORDER — WARFARIN SODIUM 2.5 MG/1
1 TABLET ORAL
Qty: 0 | Refills: 0 | DISCHARGE

## 2021-07-09 RX ORDER — ASPIRIN/CALCIUM CARB/MAGNESIUM 324 MG
1 TABLET ORAL
Qty: 0 | Refills: 0 | DISCHARGE

## 2021-07-09 RX ORDER — OXYCODONE AND ACETAMINOPHEN 5; 325 MG/1; MG/1
1 TABLET ORAL
Qty: 20 | Refills: 0
Start: 2021-07-09

## 2021-07-09 RX ORDER — OXYCODONE HYDROCHLORIDE 5 MG/1
5 TABLET ORAL ONCE
Refills: 0 | Status: DISCONTINUED | OUTPATIENT
Start: 2021-07-09 | End: 2021-07-09

## 2021-07-09 RX ORDER — ONDANSETRON 8 MG/1
4 TABLET, FILM COATED ORAL ONCE
Refills: 0 | Status: DISCONTINUED | OUTPATIENT
Start: 2021-07-09 | End: 2021-07-09

## 2021-07-09 RX ORDER — TAMSULOSIN HYDROCHLORIDE 0.4 MG/1
0.4 CAPSULE ORAL ONCE
Refills: 0 | Status: COMPLETED | OUTPATIENT
Start: 2021-07-09 | End: 2021-07-09

## 2021-07-09 RX ORDER — SODIUM CHLORIDE 9 MG/ML
1000 INJECTION, SOLUTION INTRAVENOUS
Refills: 0 | Status: DISCONTINUED | OUTPATIENT
Start: 2021-07-09 | End: 2021-07-09

## 2021-07-09 RX ORDER — MULTIVIT-MIN/FERROUS GLUCONATE 9 MG/15 ML
1 LIQUID (ML) ORAL
Qty: 0 | Refills: 0 | DISCHARGE

## 2021-07-09 RX ORDER — TAMSULOSIN HYDROCHLORIDE 0.4 MG/1
1 CAPSULE ORAL
Qty: 0 | Refills: 0 | DISCHARGE

## 2021-07-09 RX ORDER — DIGOXIN 250 MCG
1 TABLET ORAL
Qty: 0 | Refills: 0 | DISCHARGE

## 2021-07-09 RX ORDER — METOPROLOL TARTRATE 50 MG
1 TABLET ORAL
Qty: 0 | Refills: 0 | DISCHARGE

## 2021-07-09 RX ORDER — HYDROMORPHONE HYDROCHLORIDE 2 MG/ML
0.5 INJECTION INTRAMUSCULAR; INTRAVENOUS; SUBCUTANEOUS ONCE
Refills: 0 | Status: DISCONTINUED | OUTPATIENT
Start: 2021-07-09 | End: 2021-07-09

## 2021-07-09 RX ADMIN — TAMSULOSIN HYDROCHLORIDE 0.4 MILLIGRAM(S): 0.4 CAPSULE ORAL at 15:36

## 2021-07-09 RX ADMIN — SODIUM CHLORIDE 75 MILLILITER(S): 9 INJECTION, SOLUTION INTRAVENOUS at 11:52

## 2021-07-09 RX ADMIN — SODIUM CHLORIDE 75 MILLILITER(S): 9 INJECTION, SOLUTION INTRAVENOUS at 15:32

## 2021-07-09 NOTE — ASU DISCHARGE PLAN (ADULT/PEDIATRIC) - CARE PROVIDER_API CALL
Yifan Patel)  Surgery  [address] [place] [id number]  Phone: [phone]  Fax: [phone]  Follow Up Time:

## 2021-07-09 NOTE — BRIEF OPERATIVE NOTE - NSICDXBRIEFPOSTOP_GEN_ALL_CORE_FT
POST-OP DIAGNOSIS:  Unilateral inguinal hernia, without obstruction or gangrene, not specified as recurrent 09-Jul-2021 14:57:01  Sam Starr

## 2021-07-09 NOTE — BRIEF OPERATIVE NOTE - NSICDXBRIEFPROCEDURE_GEN_ALL_CORE_FT
PROCEDURES:  Open repair of inguinal hernia using mesh in adult 09-Jul-2021 14:57:28 with installation of On-Q pain pump Sam Starr

## 2021-07-09 NOTE — BRIEF OPERATIVE NOTE - NSICDXBRIEFPREOP_GEN_ALL_CORE_FT
PRE-OP DIAGNOSIS:  Unilateral inguinal hernia, without obstruction or gangrene, not specified as recurrent 09-Jul-2021 14:56:57  Sam Starr

## 2021-07-09 NOTE — ASU DISCHARGE PLAN (ADULT/PEDIATRIC) - ASU DC SPECIAL INSTRUCTIONSFT
Do not shower until you see Dr. Patel in his office - make an appointment for Monday, 7/12 (if not already made).  Dr. Patel will remove the pain pump catheters at that appointment.  You may not shower or bathe while pain pump catheters are in place - sponge-bathe only.  Do not remove steri-strips.     No heavy lifting (nothing heavier than 5 lbs.), no strenuous physical activity, no exercise.      You may perform your usual daily tasks as tolerated.      Keep your legs elevated as much as possible (with your feet 18-20 inches above your heart) to help prevent pain/throbbing/swelling at surgical site.     Take Percocet  for severe pain as prescribed.  Take Ibuprofen as prescribed for moderate pain (take with food).  Take colace as prescribed to prevent constipation.

## 2021-07-12 ENCOUNTER — APPOINTMENT (OUTPATIENT)
Dept: SURGERY | Facility: CLINIC | Age: 71
End: 2021-07-12
Payer: MEDICARE

## 2021-07-12 VITALS — TEMPERATURE: 98.5 F

## 2021-07-12 PROCEDURE — 99024 POSTOP FOLLOW-UP VISIT: CPT

## 2021-07-12 PROCEDURE — 11982 REMOVE DRUG IMPLANT DEVICE: CPT | Mod: 58

## 2021-07-12 NOTE — HISTORY OF PRESENT ILLNESS
[de-identified] : s/p open repair of right inguinal hernia with pain pump on 7/9/21 [de-identified] : pt c/o swelling. Minimal pain, normal GI functioning without fevers or chills.

## 2021-07-19 ENCOUNTER — APPOINTMENT (OUTPATIENT)
Dept: SURGERY | Facility: CLINIC | Age: 71
End: 2021-07-19
Payer: MEDICARE

## 2021-07-19 VITALS — TEMPERATURE: 97.9 F

## 2021-07-19 DIAGNOSIS — K58.2 MIXED IRRITABLE BOWEL SYNDROME: ICD-10-CM

## 2021-07-19 DIAGNOSIS — B35.6 TINEA CRURIS: ICD-10-CM

## 2021-07-19 PROCEDURE — 99024 POSTOP FOLLOW-UP VISIT: CPT

## 2021-07-19 RX ORDER — BUTENAFINE HCL 1 %
1 CREAM (GRAM) TOPICAL DAILY
Qty: 1 | Refills: 3 | Status: ACTIVE | COMMUNITY
Start: 2021-07-19 | End: 1900-01-01

## 2021-07-19 RX ORDER — DICYCLOMINE HYDROCHLORIDE 10 MG/1
10 CAPSULE ORAL 3 TIMES DAILY
Qty: 90 | Refills: 0 | Status: ACTIVE | COMMUNITY
Start: 2021-07-19 | End: 1900-01-01

## 2021-07-19 NOTE — PHYSICAL EXAM
[Normal Breath Sounds] : Normal breath sounds [Normal Heart Sounds] : normal heart sounds [Normal Rate and Rhythm] : normal rate and rhythm [Calm] : calm [de-identified] : well developed male in no distress [de-identified] : normal bowel sounds, without distension or tenderness. [de-identified] : incision clean and closed, decreased swelling, repair intact [de-identified] : without calf pain or swelling

## 2021-07-19 NOTE — HISTORY OF PRESENT ILLNESS
[de-identified] : s/p open repair of right inguinal hernia with pain pump on 7/9/21 [de-identified] : pt c/o pain when getting out of bed, but improving, NOrmal GI functioning. Denies fevers or chills, Pt c/o that his irritable bowel is acting up and he has jock itch.

## 2021-08-16 ENCOUNTER — APPOINTMENT (OUTPATIENT)
Dept: SURGERY | Facility: CLINIC | Age: 71
End: 2021-08-16
Payer: MEDICARE

## 2021-08-16 VITALS — TEMPERATURE: 98 F

## 2021-08-16 DIAGNOSIS — Z87.19 OTHER SPECIFIED POSTPROCEDURAL STATES: ICD-10-CM

## 2021-08-16 DIAGNOSIS — Z98.890 OTHER SPECIFIED POSTPROCEDURAL STATES: ICD-10-CM

## 2021-08-16 PROCEDURE — 99024 POSTOP FOLLOW-UP VISIT: CPT

## 2021-08-17 PROBLEM — Z98.890 S/P REPAIR OF INGUINAL HERNIA: Status: ACTIVE | Noted: 2021-07-12

## 2021-08-17 NOTE — PHYSICAL EXAM
[Normal Breath Sounds] : Normal breath sounds [Normal Heart Sounds] : normal heart sounds [Normal Rate and Rhythm] : normal rate and rhythm [Calm] : calm [de-identified] : well developed male in no distress [de-identified] : normal bowel sounds, without distension or tenderness [de-identified] : Incision clean and closed, repair intact, without swelling [de-identified] : without calf pain or swelling

## 2021-08-17 NOTE — HISTORY OF PRESENT ILLNESS
[de-identified] : s/p open repair of right inguinal hernia with pain pump on 7/9/21 [de-identified] : pt doing well, normal GI functioning, denies any fevers or chills

## 2022-08-11 NOTE — H&P PST ADULT - TEACHING/LEARNING FACTORS IMPACT ABILITY TO LEARN
2022    [facility]    Chief Complaint   Patient presents with    1 Month Follow-Up     6 week. Still has a little cough. HPI  History was obtained from the patient. Rj Burger is a 38 y.o. male who presents today with routine recheck. Patient's cough is better recent chest x-ray was unremarkable. Still more of a throat tickle no shortness of breath. Has been working on rehabbing his house and some drywall dust exposure. GERD symptoms come and go usually controlled unless he forgets his medicine or eat something unexpected that triggers off heartburn. Anxiety depression is actually doing quite well at this time blood pressure as recorded remains borderline needs to keep an eye on weight, salt, and lipids. Recent labs did show mild liver enzyme elevation and some elevation of the cholesterol. He has not had much in the way of migraine headaches at this point. We did discuss importance of him losing weight and staying on low-fat diet and will keep track of his liver enzymes over time. Continues to work a lot of hours and at work after hours on his house. Lissett Stroud REVIEW OF SYMPTOMS    Review of Systems   Constitutional:  Negative for activity change and fatigue. HENT:  Negative for congestion, hearing loss, mouth sores and trouble swallowing. Eyes:  Negative for pain and visual disturbance. Respiratory:  Negative for chest tightness, shortness of breath and wheezing. Cardiovascular:  Negative for chest pain and palpitations. Gastrointestinal:  Negative for abdominal pain, blood in stool, diarrhea, nausea and vomiting. GERD overall is stable -occasional symptoms with forgetting meds. Endocrine: Negative for polydipsia and polyuria. Genitourinary:  Negative for dysuria, frequency and urgency. Musculoskeletal:  Negative for arthralgias, gait problem and neck stiffness. Skin:  Negative for rash. Allergic/Immunologic: Negative for environmental allergies.    Neurological: Positive for headaches. Negative for dizziness, seizures, speech difficulty and weakness. Hematological:  Does not bruise/bleed easily. Psychiatric/Behavioral:  Negative for agitation, confusion, dysphoric mood, hallucinations, self-injury and suicidal ideas. The patient is not nervous/anxious. PAST MEDICAL HISTORY  Past Medical History:   Diagnosis Date    GERD (gastroesophageal reflux disease)        FAMILY HISTORY  No family history on file. SOCIAL HISTORY  Social History     Socioeconomic History    Marital status:    Tobacco Use    Smoking status: Never    Smokeless tobacco: Never   Substance and Sexual Activity    Alcohol use: Yes    Drug use: Never     Social Determinants of Health     Financial Resource Strain: Low Risk     Difficulty of Paying Living Expenses: Not hard at all   Food Insecurity: No Food Insecurity    Worried About [employer] in the Last Year: Never true    [employer] in the Last Year: Never true        SURGICAL HISTORY  Past Surgical History:   Procedure Laterality Date    VASECTOMY  2017                 CURRENT MEDICATIONS  Current Outpatient Medications   Medication Sig Dispense Refill    busPIRone (BUSPAR) 5 MG tablet Take 5 mg by mouth in the morning and 5 mg before bedtime. Patient states he is not taking this. Koki Valladares omeprazole (PRILOSEC) 40 MG delayed release capsule TAKE ONE CAPSULE BY MOUTH DAILY 90 capsule 1    Multiple Vitamins-Minerals (MULTIVITAMIN ADULT PO) Take by mouth       No current facility-administered medications for this visit.        ALLERGIES  Allergies   Allergen Reactions    Bactrim [Sulfamethoxazole-Trimethoprim]     Sulfa Antibiotics Other (See Comments)     Doesn't recall reaction but had to be seen in ED       PHYSICAL EXAM    BP (!) 136/90 (Site: Right Upper Arm, Position: Sitting, Cuff Size: Large Adult)   Pulse 58   Ht 5' 11.5\" (1.816 m)   Wt 251 lb 9.6 oz (114.1 kg)   SpO2 96%   BMI 34.60 kg/m²     Physical Exam  Vitals and nursing note reviewed. Constitutional:       General: He is not in acute distress. Appearance: He is well-developed. He is not ill-appearing, toxic-appearing or diaphoretic. HENT:      Head: Normocephalic and atraumatic. Nose: Nose normal.      Mouth/Throat:      Mouth: Mucous membranes are moist.      Pharynx: Oropharynx is clear. No oropharyngeal exudate. Eyes:      Pupils: Pupils are equal, round, and reactive to light. Cardiovascular:      Rate and Rhythm: Normal rate and regular rhythm. Heart sounds: Normal heart sounds. No murmur heard. No gallop. Pulmonary:      Effort: Pulmonary effort is normal. No respiratory distress. Breath sounds: Normal breath sounds. No wheezing or rales. Abdominal:      General: There is no distension. Palpations: Abdomen is soft. Tenderness: There is no abdominal tenderness. Musculoskeletal:         General: No swelling or deformity. Normal range of motion. Cervical back: Normal range of motion and neck supple. No rigidity. Lymphadenopathy:      Cervical: No cervical adenopathy. Skin:     General: Skin is warm and dry. Coloration: Skin is not jaundiced. Findings: No bruising. Neurological:      General: No focal deficit present. Mental Status: He is alert and oriented to person, place, and time. Mental status is at baseline. Cranial Nerves: No cranial nerve deficit. Motor: No weakness. Psychiatric:         Mood and Affect: Mood normal.         Behavior: Behavior normal.         Thought Content: Thought content normal.       ASSESSMENT & PLAN     Diagnosis Orders   1. Anxiety/depression        2. Gastroesophageal reflux disease without esophagitis  omeprazole (PRILOSEC) 40 MG delayed release capsule      3. LFT elevation        4.  Class 1 obesity due to excess calories with serious comorbidity and body mass index (BMI) of 34.0 to 34.9 in adult        Discussion carried out about these diagnoses and findings. Patient to continue to work on healthy lifestyle with regular exercise, weight loss, and low-fat low-salt diet. Continue on same regimen and refills will be given advised to get COVID vaccine when vaccine is available in the next month or 2. Call with questions or issues    Return in about 6 months (around 2/11/2023).          Electronically signed by Rajinder Andersen MD on 8/11/2022 none

## 2022-12-13 ENCOUNTER — APPOINTMENT (OUTPATIENT)
Dept: INTERNAL MEDICINE | Facility: CLINIC | Age: 72
End: 2022-12-13

## 2022-12-21 ENCOUNTER — APPOINTMENT (OUTPATIENT)
Dept: INTERNAL MEDICINE | Facility: CLINIC | Age: 72
End: 2022-12-21

## 2022-12-21 VITALS
BODY MASS INDEX: 24.91 KG/M2 | WEIGHT: 174 LBS | OXYGEN SATURATION: 95 % | SYSTOLIC BLOOD PRESSURE: 123 MMHG | HEART RATE: 132 BPM | DIASTOLIC BLOOD PRESSURE: 76 MMHG | RESPIRATION RATE: 12 BRPM | HEIGHT: 70 IN | TEMPERATURE: 97.8 F

## 2022-12-21 DIAGNOSIS — K64.9 UNSPECIFIED HEMORRHOIDS: ICD-10-CM

## 2022-12-21 DIAGNOSIS — I25.10 ATHEROSCLEROTIC HEART DISEASE OF NATIVE CORONARY ARTERY W/OUT ANGINA PECTORIS: ICD-10-CM

## 2022-12-21 PROCEDURE — 99213 OFFICE O/P EST LOW 20 MIN: CPT | Mod: 25

## 2022-12-29 ENCOUNTER — APPOINTMENT (OUTPATIENT)
Dept: OTOLARYNGOLOGY | Facility: CLINIC | Age: 72
End: 2022-12-29
Payer: MEDICARE

## 2022-12-29 VITALS — WEIGHT: 175 LBS | BODY MASS INDEX: 25.05 KG/M2 | HEIGHT: 70 IN

## 2022-12-29 VITALS — SYSTOLIC BLOOD PRESSURE: 145 MMHG | DIASTOLIC BLOOD PRESSURE: 94 MMHG | HEART RATE: 74 BPM

## 2022-12-29 DIAGNOSIS — H61.21 IMPACTED CERUMEN, RIGHT EAR: ICD-10-CM

## 2022-12-29 DIAGNOSIS — J34.2 DEVIATED NASAL SEPTUM: ICD-10-CM

## 2022-12-29 DIAGNOSIS — R04.0 EPISTAXIS: ICD-10-CM

## 2022-12-29 PROCEDURE — 99203 OFFICE O/P NEW LOW 30 MIN: CPT | Mod: 25

## 2022-12-29 PROCEDURE — 31231 NASAL ENDOSCOPY DX: CPT

## 2022-12-29 PROCEDURE — 69210 REMOVE IMPACTED EAR WAX UNI: CPT | Mod: 59

## 2022-12-29 RX ORDER — MULTIVIT-MIN/FA/LYCOPEN/LUTEIN .4-300-25
TABLET ORAL
Refills: 0 | Status: ACTIVE | COMMUNITY

## 2022-12-29 NOTE — CONSULT LETTER
[Dear  ___] : Dear  [unfilled], [Consult Letter:] : I had the pleasure of evaluating your patient, [unfilled]. [Please see my note below.] : Please see my note below. [Consult Closing:] : Thank you very much for allowing me to participate in the care of this patient.  If you have any questions, please do not hesitate to contact me. [Sincerely,] : Sincerely, [FreeTextEntry3] : Kash Lee M.D.

## 2022-12-29 NOTE — PHYSICAL EXAM
[Nasal Endoscopy Performed] : nasal endoscopy was performed, see procedure section for findings [] : septum deviated to the right [Midline] : trachea located in midline position [Removed] : palatine tonsils previously removed [Normal] : no rashes [de-identified] : Right cerumen impaction. Left EAC clear.

## 2022-12-29 NOTE — ASSESSMENT
[FreeTextEntry1] : Alfredo Patton presents for evaluation of one time episode of right epistaxis, controlled in ER with afrin and pressure. He is on warfarin. No anterior source of bleeding was identified. Sinonasal endoscopy showed septal deviation to the right but no posterior source of bleeding or nasopharyngeal lesion. Right sided cerumen impaction was removed. Epistaxis precautions were provided:\par \par Prevention of epistaxis:\par 1. The goal is to maintain good hydration of the lining of the nose. Dryness inside the nose is a\par major cause of bleeding.\par 2. Use 2-3 sprays of nasal saline to both sides of nose several times daily.\par 3. Apply small amount of antibiotic ointment to lining of the front of nose daily.\par 4. Avoid the use of ceiling fans or blowing air which can dry out your nose.\par 5. Consider use of a humidifier in the bedroom.\par 6. Avoid vigorous nose blowing. Cough and sneeze with your mouth open.\par 7. Your doctor may recommend stopping medicated nasal sprays for allergy or sinusitis, as\par these can sometimes worsen nosebleeds.\par 8. Maintain good control of your blood pressure.\par 9. If you are taking blood thinning medications, maintain careful follow-up with your prescribing\par physician to make sure these are properly dosed.\par \par What to do if you have a nosebleed:\par 1. Use 2-3 sprays of topical nasal decongestant (ex: oxymetazoline, Afrin) on each side of the\par nose.\par 2. Hold firm pressure to soft part of nose for at least 5 minutes. Repeat as needed.\par 3. If bleeding is severe or does not resolve with these directions, seek immediate medical care.\par \par Follow up prn.

## 2022-12-29 NOTE — HISTORY OF PRESENT ILLNESS
[de-identified] : Alfredo Patton is a 71 yo male with hx atrial fibrillation, CAD s/p stent placement who was referred by Dr. Marsh for evaluation of right epistaxis. He had a one time episode of bleeding from the right nostril yesterday. He went to the ED and they were able to stop this with pressure. He denies preceding trauma to the nose or URI. He denies headaches, lightheadedness, or vision changes. He denies fevers or chills. He denies family history of epistaxis. He takes warfarin for his CAD. He has undergone septal cautery in childhood but this is the first nosebleed since.

## 2023-01-20 ENCOUNTER — OFFICE (OUTPATIENT)
Dept: URBAN - METROPOLITAN AREA CLINIC 109 | Facility: CLINIC | Age: 73
Setting detail: OPHTHALMOLOGY
End: 2023-01-20
Payer: MEDICARE

## 2023-01-20 DIAGNOSIS — H25.13: ICD-10-CM

## 2023-01-20 DIAGNOSIS — H40.053: ICD-10-CM

## 2023-01-20 DIAGNOSIS — H35.712: ICD-10-CM

## 2023-01-20 DIAGNOSIS — H43.811: ICD-10-CM

## 2023-01-20 PROCEDURE — 92020 GONIOSCOPY: CPT | Performed by: OPHTHALMOLOGY

## 2023-01-20 PROCEDURE — 99214 OFFICE O/P EST MOD 30 MIN: CPT | Performed by: OPHTHALMOLOGY

## 2023-01-20 PROCEDURE — 92201 OPSCPY EXTND RTA DRAW UNI/BI: CPT | Performed by: OPHTHALMOLOGY

## 2023-01-20 ASSESSMENT — REFRACTION_AUTOREFRACTION
OS_CYLINDER: -0.50
OD_AXIS: 99
OD_SPHERE: +1.00
OD_CYLINDER: -1.75
OS_AXIS: 117
OS_SPHERE: +2.50

## 2023-01-20 ASSESSMENT — REFRACTION_CURRENTRX
OS_OVR_VA: 20/
OS_OVR_VA: 20/
OD_SPHERE: +0.75
OS_CYLINDER: -1.00
OD_CYLINDER: -1.00
OD_SPHERE: +3.00
OD_OVR_VA: 20/
OS_CYLINDER: -1.00
OS_AXIS: 86
OD_OVR_VA: 20/
OD_AXIS: 89
OS_AXIS: 87
OD_AXIS: 92
OS_SPHERE: +0.75
OS_SPHERE: +3.00
OD_CYLINDER: -1.00

## 2023-01-20 ASSESSMENT — KERATOMETRY
OD_AXISANGLE_DEGREES: 068
OD_K2POWER_DIOPTERS: 42.00
OS_K2POWER_DIOPTERS: 42.12
OS_K1POWER_DIOPTERS: 41.12
OD_K1POWER_DIOPTERS: 40.50
OS_AXISANGLE_DEGREES: 039

## 2023-01-20 ASSESSMENT — SPHEQUIV_DERIVED
OD_SPHEQUIV: 0.125
OS_SPHEQUIV: 2.25
OD_SPHEQUIV: 0.125

## 2023-01-20 ASSESSMENT — AXIALLENGTH_DERIVED
OS_AL: 23.41
OD_AL: 24.3962
OD_AL: 24.3962

## 2023-01-20 ASSESSMENT — REFRACTION_MANIFEST
OD_SPHERE: +1.00
OD_VA1: 20/40+
OD_AXIS: 100
OD_CYLINDER: -1.75

## 2023-01-20 ASSESSMENT — PACHYMETRY
OD_CT_UM: 590
OD_CT_CORRECTION: -4
OS_CT_UM: 592
OS_CT_CORRECTION: -4

## 2023-01-20 ASSESSMENT — CONFRONTATIONAL VISUAL FIELD TEST (CVF)
OS_FINDINGS: FULL
OD_FINDINGS: FULL

## 2023-01-20 ASSESSMENT — VISUAL ACUITY
OD_BCVA: 20/150
OS_BCVA: 20/40

## 2023-03-06 NOTE — H&P PST ADULT - FUNCTIONAL SCREEN CURRENT LEVEL: BATHING, MLM
"Mulu is a 19 year old who is being evaluated via a billable video visit.      How would you like to obtain your AVS? {AVS Preference:600084}  If the video visit is dropped, the invitation should be resent by: {video visit invitation (Optional) :751734}  Will anyone else be joining your video visit? {:979490}  {If patient encounters technical issues they should call [phone] :601997}        {PROVIDER CHARTING PREFERENCE:174055}    Subjective   Mulu is a 19 year old, presenting for the following health issues:  Recheck Medication      History of Present Illness       Reason for visit:  Discussion of treatment of ADHD    He eats 2-3 servings of fruits and vegetables daily.He consumes 1 sweetened beverage(s) daily.He exercises with enough effort to increase his heart rate 30 to 60 minutes per day.  He exercises with enough effort to increase his heart rate 5 days per week. He is missing 4 dose(s) of medications per week.    Today's PHQ-9         PHQ-9 Total Score: 14    PHQ-9 Q9 Thoughts of better off dead/self-harm past 2 weeks :   Not at all    How difficult have these problems made it for you to do your work, take care of things at home, or get along with other people: Very difficult       Medication Followup of Adderall XR 20 mg     Taking Medication as prescribed: {.:949316::\"yes\"}    Side Effects:  {NONEORCHOOSE:477219::\"None\"}    Medication Helping Symptoms:  {.:327441::\"yes\"}    {additonal problems for provider to add (Optional):043917}    Review of Systems   {ROS COMP (Optional):914857}      Objective           Vitals:  No vitals were obtained today due to virtual visit.    Physical Exam   {video visit exam brief selected:747915::\"GENERAL: Healthy, alert and no distress\",\"EYES: Eyes grossly normal to inspection.  No discharge or erythema, or obvious scleral/conjunctival abnormalities.\",\"RESP: No audible wheeze, cough, or visible cyanosis.  No visible retractions or increased work of breathing.  " "\",\"SKIN: Visible skin clear. No significant rash, abnormal pigmentation or lesions.\",\"NEURO: Cranial nerves grossly intact.  Mentation and speech appropriate for age.\",\"PSYCH: Mentation appears normal, affect normal/bright, judgement and insight intact, normal speech and appearance well-groomed.\"}    {Diagnostic Test Results (Optional):837853}    {AMBULATORY ATTESTATION (Optional):642008}        Video-Visit Details    Type of service:  Video Visit     Originating Location (pt. Location): {video visit patient location:614809::\"Home\"}  {PROVIDER LOCATION On-site should be selected for visits conducted from your clinic location or adjoining [facility] hospital, academic office, or other nearby [facility] building. Off-site should be selected for all other provider locations, including home:531966}  Distant Location (provider location):  {virtual location provider:698252}  Platform used for Video Visit: {Virtual Visit Platforms:255228::\"[employer]\"}    " 0 = independent

## 2023-03-15 ENCOUNTER — OFFICE (OUTPATIENT)
Dept: URBAN - METROPOLITAN AREA CLINIC 109 | Facility: CLINIC | Age: 73
Setting detail: OPHTHALMOLOGY
End: 2023-03-15
Payer: MEDICARE

## 2023-03-15 DIAGNOSIS — H35.712: ICD-10-CM

## 2023-03-15 DIAGNOSIS — H43.811: ICD-10-CM

## 2023-03-15 DIAGNOSIS — H53.19: ICD-10-CM

## 2023-03-15 DIAGNOSIS — H40.053: ICD-10-CM

## 2023-03-15 DIAGNOSIS — H25.13: ICD-10-CM

## 2023-03-15 PROCEDURE — 92014 COMPRE OPH EXAM EST PT 1/>: CPT | Performed by: OPHTHALMOLOGY

## 2023-03-15 PROCEDURE — 92134 CPTRZ OPH DX IMG PST SGM RTA: CPT | Performed by: OPHTHALMOLOGY

## 2023-03-15 ASSESSMENT — REFRACTION_CURRENTRX
OS_CYLINDER: -1.00
OD_AXIS: 89
OS_AXIS: 86
OS_CYLINDER: -1.00
OD_CYLINDER: -1.00
OS_SPHERE: +3.00
OS_AXIS: 87
OD_OVR_VA: 20/
OS_SPHERE: +0.75
OS_OVR_VA: 20/
OD_SPHERE: +0.75
OD_SPHERE: +3.00
OD_OVR_VA: 20/
OD_AXIS: 92
OD_CYLINDER: -1.00
OS_OVR_VA: 20/

## 2023-03-15 ASSESSMENT — CONFRONTATIONAL VISUAL FIELD TEST (CVF)
OS_FINDINGS: FULL
OD_FINDINGS: FULL

## 2023-03-15 ASSESSMENT — KERATOMETRY
OD_K2POWER_DIOPTERS: 42.00
OD_AXISANGLE_DEGREES: 068
OS_K2POWER_DIOPTERS: 42.12
OS_K1POWER_DIOPTERS: 41.12
OD_K1POWER_DIOPTERS: 40.50
OS_AXISANGLE_DEGREES: 039

## 2023-03-15 ASSESSMENT — REFRACTION_MANIFEST
OD_VA1: 20/40+
OD_AXIS: 100
OD_CYLINDER: -1.75
OD_SPHERE: +1.00

## 2023-03-15 ASSESSMENT — VISUAL ACUITY
OS_BCVA: 20/40-1
OD_BCVA: 20/150

## 2023-03-15 ASSESSMENT — PACHYMETRY
OD_CT_CORRECTION: -4
OS_CT_CORRECTION: -4
OS_CT_UM: 592
OD_CT_UM: 590

## 2023-03-15 ASSESSMENT — TONOMETRY
OD_IOP_MMHG: 20
OS_IOP_MMHG: 21

## 2023-03-15 ASSESSMENT — AXIALLENGTH_DERIVED: OD_AL: 24.3962

## 2023-03-15 ASSESSMENT — SPHEQUIV_DERIVED: OD_SPHEQUIV: 0.125

## 2023-03-16 NOTE — ED ADULT NURSE NOTE - CHPI ED NUR DURATION
today Staged Advancement Flap Text: The defect edges were debeveled with a #15 scalpel blade.  Given the location of the defect, shape of the defect and the proximity to free margins a staged advancement flap was deemed most appropriate.  Using a sterile surgical marker, an appropriate advancement flap was drawn incorporating the defect and placing the expected incisions within the relaxed skin tension lines where possible. The area thus outlined was incised deep to adipose tissue with a #15 scalpel blade.  The skin margins were undermined to an appropriate distance in all directions utilizing iris scissors.

## 2023-09-13 ENCOUNTER — OFFICE (OUTPATIENT)
Dept: URBAN - METROPOLITAN AREA CLINIC 109 | Facility: CLINIC | Age: 73
Setting detail: OPHTHALMOLOGY
End: 2023-09-13
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
## 2023-09-13 PROCEDURE — NO SHOW FE NO SHOW FEE: Performed by: OPHTHALMOLOGY

## 2023-11-05 NOTE — ED PROVIDER NOTE - PRINCIPAL DIAGNOSIS
55 y/o M with a Hx of stroke one year ago (at the time patient received TNK and had full  resolution of symptoms), HTN, DM, non-compliant with medications, who presented following an episode of LOC, as per family at around 6:15 pm patient had a sudden fall at work and lost consciousness. It is unclear if patient had any symptoms before he fell. NIHSS 10 on admission, admitted under NeuroICU on 10/12: Thrombectomy via L radial artery for TICI 3 revascularization of L P1-P2 occlusion. Noted LMCA chronic occlusion., on 0/13: Neurologic status improving. Got MR. Started on ASA and SQL, on 10/14: quadrantanopia now on L superior (originally R); Repeat stroke imaging completed and initial concern for CTP showing poss new perfusion deficit however on further review noted to be present on admission. Started on heparin drip per neurology recommendations, on 10/15: Therapeutic on heparin drip, stability HCT obtained. Cardiology consulted for further stroke work-up and downgraded under medicine.     #Bradycardia, episodes of 2:1 block  TTE EF 50-55%, no other structural abnormalities  - EP f/u re ILR interrogation given reported dizziness today.  - repeat EKG with any subsequent episodes; his HR was reportedly in 60's and BP stable during dizziness episode.  - EKG 10/31, HR 51 with sinus bradycardia, unchanged RBBB with PVCs, TWI in lateral leads.  - per electrophysiology notes (reviewed), no indication for pacemaker, s/p ILR on 10/20  - needs EP follow up as an outpatient.    #Acute CVA  #Occluded R MCA/PCA  s/p thrombectomy   TTE shows with  Normal left ventricular internal cavity size,  3. Left ventricular ejection fraction, by visual estimation, is 50 to 55%.  DVT studies negative  Per Neuro no need for ROCIO  continue Eliquis. Needs for 3 months.    c/w Lipitor  BP goal per NSGY can be titrated down 130-160 today, then 120-150 tomorrow and by 10 points daily  outpatient neuro IR follow up.    #ETOH use disorder  -  in remission, stable.    #HTN  No meds at home  - did not tolerate lisinopril 5mg during hospitalization (intermittent hypotension)  Hydralazine 25 mg PRN if SBP > 170    #HLD  c/w Lipitor 80mg QD    #DM 2  non compliant with meds at home  A1C 8.6  Accu checks and ISS    #Elevated TSH  T4 level normal  outpt f/u    DVT Prophylaxis -- Patient is on Eliquis.  Abdominal pain, left lower quadrant Diverticulitis of large intestine with perforation, unspecified bleeding status

## 2024-02-02 NOTE — ASU PATIENT PROFILE, ADULT - MEDICATIONS BROUGHT TO HOSPITAL, PROFILE
no [Initial Evaluation] : an initial evaluation [Hypothyroidism] : hypothyroidism [Source: ______] : History obtained from [facility] [FreeTextEntry2] : Self-referral

## 2024-03-20 ENCOUNTER — OFFICE (OUTPATIENT)
Dept: URBAN - METROPOLITAN AREA CLINIC 109 | Facility: CLINIC | Age: 74
Setting detail: OPHTHALMOLOGY
End: 2024-03-20
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
## 2024-03-20 PROCEDURE — NO SHOW FE NO SHOW FEE: Performed by: OPHTHALMOLOGY

## 2025-02-17 NOTE — PHYSICAL EXAM
[Normal Breath Sounds] : Normal breath sounds [Normal Heart Sounds] : normal heart sounds [Normal Rate and Rhythm] : normal rate and rhythm [Calm] : calm [de-identified] : well developed white male in no acute distress [de-identified] : normal bowel sounds, without distension or tenderness [de-identified] : incision clean and closed with ecchymosis and swelling. Pain pump catheter removed. [de-identified] : without calf pain or swelling Rishabh

## 2025-04-23 ENCOUNTER — APPOINTMENT (OUTPATIENT)
Dept: INTERNAL MEDICINE | Facility: CLINIC | Age: 75
End: 2025-04-23
Payer: MEDICARE

## 2025-04-23 VITALS
WEIGHT: 159 LBS | HEART RATE: 91 BPM | OXYGEN SATURATION: 97 % | SYSTOLIC BLOOD PRESSURE: 134 MMHG | DIASTOLIC BLOOD PRESSURE: 77 MMHG | RESPIRATION RATE: 14 BRPM | HEIGHT: 70 IN | BODY MASS INDEX: 22.76 KG/M2

## 2025-04-23 DIAGNOSIS — I25.10 ATHEROSCLEROTIC HEART DISEASE OF NATIVE CORONARY ARTERY W/OUT ANGINA PECTORIS: ICD-10-CM

## 2025-04-23 DIAGNOSIS — K57.30 DIVERTICULOSIS OF LARGE INTESTINE W/OUT PERFORATION OR ABSCESS W/OUT BLEEDING: ICD-10-CM

## 2025-04-23 DIAGNOSIS — I48.91 UNSPECIFIED ATRIAL FIBRILLATION: ICD-10-CM

## 2025-04-23 PROCEDURE — 99214 OFFICE O/P EST MOD 30 MIN: CPT

## 2025-06-24 NOTE — H&P PST ADULT - PAIN CHRONIC, PROFILE
I personally made and approved the management plan for this patient and take responsibility for the patient management.      I personally discussed this case with consultant Dr. Gracia of [facility] who thought patient would require neurology consult prior to psychiatric admission.  I discussed the case with [facility] who, given stroke workup with MRI 3 days ago without acute findings, suggested routine EEG could be done but otherwise psychiatric care was more appropriate.  I personally discussed this case with hospitalist Dr. Ellsworth who accepted observation admission with plan for EEG tomorrow and then planned psychiatric care.      Kyle Stone MD  [facility]       Kyle Stone MD  06/23/25 1638     no